# Patient Record
Sex: FEMALE | Race: WHITE | Employment: OTHER | URBAN - METROPOLITAN AREA
[De-identification: names, ages, dates, MRNs, and addresses within clinical notes are randomized per-mention and may not be internally consistent; named-entity substitution may affect disease eponyms.]

---

## 2017-02-20 ENCOUNTER — TRANSFERRED RECORDS (OUTPATIENT)
Dept: HEALTH INFORMATION MANAGEMENT | Facility: CLINIC | Age: 62
End: 2017-02-20

## 2017-02-20 LAB
CHOLEST SERPL-MCNC: 239 MG/DL (ref 0–199)
HDLC SERPL-MCNC: 50 MG/DL
HPV ABSTRACT: NORMAL
LDLC SERPL CALC-MCNC: 168 MG/DL (ref 0–129)
NONHDLC SERPL-MCNC: 189 MG/DL
PAP-ABSTRACT: NORMAL
TRIGL SERPL-MCNC: 103 MG/DL (ref 0–149)

## 2017-07-08 ENCOUNTER — HEALTH MAINTENANCE LETTER (OUTPATIENT)
Age: 62
End: 2017-07-08

## 2017-11-22 ENCOUNTER — TRANSFERRED RECORDS (OUTPATIENT)
Dept: HEALTH INFORMATION MANAGEMENT | Facility: CLINIC | Age: 62
End: 2017-11-22

## 2017-11-22 LAB — TSH SERPL-ACNC: 1.7 ULU/ML (ref 0.3–4.5)

## 2018-08-23 NOTE — PROGRESS NOTES
[employer]  [address] [facility] [address]  [phone]  Dept: [phone]    PRE-OP EVALUATION:  Today's date: 2018    Bryanna Estevez (: 1955) presents for pre-operative evaluation assessment as requested by Dr. Parrish John.  She requires evaluation and anesthesia risk assessment prior to undergoing surgery/procedure for treatment of R knee .    Colon cancer screening was done 2015    MAMMO was completed 2016    PAP is due per pt. Pt declined    Proposed Surgery/ Procedure: R knee surgery  Date of Surgery/ Procedure: 2018  Time of Surgery/ Procedure: [facility]  Hospital/Surgical Facility: [facility]  Fax number for surgical facility: [phone]  Primary Physician: Meghan Bustillos  Type of Anesthesia Anticipated: to be determined    Patient has a Health Care Directive or Living Will:  NO    1. NO - Do you have a history of heart attack, stroke, stent, bypass or surgery on an artery in the head, neck, heart or legs?  2. NO - Do you ever have any pain or discomfort in your chest?  3. NO - Do you have a history of  Heart Failure?  4. NO - Are you troubled by shortness of breath when: walking on the level, up a slight hill or at night?  5. NO - Do you currently have a cold, bronchitis or other respiratory infection?  6. NO - Do you have a cough, shortness of breath or wheezing?  7. NO - Do you sometimes get pains in the calves of your legs when you walk?  8. NO - Do you or anyone in your family have previous history of blood clots?  9. NO - Do you or does anyone in your family have a serious bleeding problem such as prolonged bleeding following surgeries or cuts?  10. NO - Have you ever had problems with anemia or been told to take iron pills?  11. NO - Have you had any abnormal blood loss such as black, tarry or bloody stools, or abnormal vaginal bleeding?  12. NO - Have you ever had a blood transfusion?  13. YES - Have you or any of your relatives ever  had problems with anesthesia?  Does not wake up from anesthesia well, was overmedicated previously.    14. NO - Do you have sleep apnea, excessive snoring or daytime drowsiness?  15. NO - Do you have any prosthetic heart valves?  16. NO - Do you have prosthetic joints?  17. NO - Is there any chance that you may be pregnant?      HPI:     HPI related to upcoming procedure:   Knee pain, has meniscal tear and will be having repair.       See problem list for active medical problems.  Problems all longstanding and stable, except as noted/documented.  See ROS for pertinent symptoms related to these conditions.                                                                                                                                                          .    MEDICAL HISTORY:     Patient Active Problem List    Diagnosis Date Noted     History of Clostridium difficile infection 11/12/2014     Priority: Medium     Hyperlipidemia with target LDL less than 130 09/09/2014     Priority: Medium     Diagnosis updated by automated process. Provider to review and confirm.       GERD (gastroesophageal reflux disease) 05/24/2011     Priority: Medium     Advanced directives, counseling/discussion 05/16/2011     Priority: Medium     Advance Directive Problem List Overview:   Name Relationship Phone    Primary Health Care Agent            Alternative Health Care Agent          Discussed advance care planning with patient; information given to patient to review. 5/16/2011          BMI 31.0-31.9,adult 05/16/2011     Priority: Medium     CARDIOVASCULAR SCREENING; LDL GOAL LESS THAN 160 10/31/2010     Priority: Medium      Past Medical History:   Diagnosis Date     Acid reflux      Acne      Insomnia      Rosacea      Past Surgical History:   Procedure Laterality Date     ARTHROSCOPY KNEE RT/LT      both knees     C BIOPSY OF STOMACH,BY LAPAROTOMY      exp     C CREATE NEW TUBAL OPENING      reversal tubal ligation     HC REPAIR  "ACHILLES TENDON,PRIMARY      both     TUBAL LIGATION       No current outpatient prescriptions on file.     OTC products: None, except as noted above    Allergies   Allergen Reactions     Penicillins Anaphylaxis     Elavil [Amitriptyline Hcl]       Latex Allergy: NO    Social History   Substance Use Topics     Smoking status: Never Smoker     Smokeless tobacco: Never Used     Alcohol use No      Comment: rare     History   Drug Use No       REVIEW OF SYSTEMS:   Constitutional, neuro, ENT, endocrine, pulmonary, cardiac, gastrointestinal, genitourinary, musculoskeletal, integument and psychiatric systems are negative, except as otherwise noted.    EXAM:   /80  Pulse 63  Temp 97.5  F (36.4  C) (Oral)  Resp 16  Ht 5' 9\" (1.753 m)  Wt 189 lb (85.7 kg)  SpO2 98%  Breastfeeding? No  BMI 27.91 kg/m2    GENERAL APPEARANCE: healthy, alert and no distress     EYES: EOMI, PERRL     HENT: ear canals and TM's normal and nose and mouth without ulcers or lesions     NECK: no adenopathy, no asymmetry, masses, or scars and thyroid normal to palpation     RESP: lungs clear to auscultation - no rales, rhonchi or wheezes     CV: regular rates and rhythm, normal S1 S2, no S3 or S4 and no murmur, click or rub     MS: extremities normal- no gross deformities noted, no evidence of inflammation in joints, FROM in all extremities.     SKIN: no suspicious lesions or rashes     NEURO: Normal strength and tone, sensory exam grossly normal, mentation intact and speech normal     PSYCH: mentation appears normal. and affect normal/bright     LYMPHATICS: No cervical adenopathy    DIAGNOSTICS:   EKG: sinus bradycardia, normal axis, normal intervals, no acute ST/T changes c/w ischemia, no LVH by voltage criteria, unchanged from previous tracings    Recent Labs   Lab Test  06/26/15   1403  05/12/15   1107  03/14/14   0920  03/11/14   1414   HGB  15.1  14.2   --   14.2   PLT  232  239   --   308   NA  139   --    --   137   POTASSIUM  4.5  "  --    --   3.7   CR  0.78   --   0.77  0.75        IMPRESSION:   Reason for surgery/procedure: meniscal tear  Diagnosis/reason for consult:   repair    The proposed surgical procedure is considered INTERMEDIATE risk.    REVISED CARDIAC RISK INDEX  The patient has the following serious cardiovascular risks for perioperative complications such as (MI, PE, VFib and 3  AV Block):  No serious cardiac risks  INTERPRETATION: 0 risks: Class I (very low risk - 0.4% complication rate)    The patient has the following additional risks for perioperative complications:  No identified additional risks      ICD-10-CM    1. Preop general physical exam Z01.818 EKG 12-lead complete w/read - Clinics   2. Tear of meniscus of right knee as current injury, unspecified meniscus, unspecified tear type, subsequent encounter S83.206D        RECOMMENDATIONS:     --Consult hospital rounder / IM to assist post-op medical management    --Patient is to take all scheduled medications on the day of surgery EXCEPT for modifications listed below.    APPROVAL GIVEN to proceed with proposed procedure, without further diagnostic evaluation       Signed Electronically by: Jaja Hsieh PA-C  Agreed with above. Asael Weber M.D.      Copy of this evaluation report is provided to requesting physician.    [facility] Preop Guidelines    Revised Cardiac Risk Index

## 2018-08-24 ENCOUNTER — OFFICE VISIT (OUTPATIENT)
Dept: FAMILY MEDICINE | Facility: CLINIC | Age: 63
End: 2018-08-24
Payer: COMMERCIAL

## 2018-08-24 VITALS
HEIGHT: 69 IN | BODY MASS INDEX: 27.99 KG/M2 | WEIGHT: 189 LBS | RESPIRATION RATE: 16 BRPM | SYSTOLIC BLOOD PRESSURE: 137 MMHG | OXYGEN SATURATION: 98 % | DIASTOLIC BLOOD PRESSURE: 80 MMHG | TEMPERATURE: 97.5 F | HEART RATE: 63 BPM

## 2018-08-24 DIAGNOSIS — Z01.818 PREOP GENERAL PHYSICAL EXAM: Primary | ICD-10-CM

## 2018-08-24 DIAGNOSIS — S83.206D TEAR OF MENISCUS OF RIGHT KNEE AS CURRENT INJURY, UNSPECIFIED MENISCUS, UNSPECIFIED TEAR TYPE, SUBSEQUENT ENCOUNTER: ICD-10-CM

## 2018-08-24 PROCEDURE — 93000 ELECTROCARDIOGRAM COMPLETE: CPT | Performed by: PHYSICIAN ASSISTANT

## 2018-08-24 PROCEDURE — 99214 OFFICE O/P EST MOD 30 MIN: CPT | Performed by: PHYSICIAN ASSISTANT

## 2018-08-24 NOTE — MR AVS SNAPSHOT
After Visit Summary   8/24/2018    Bryanna Estevez    MRN: [id number]           Patient Information     Date Of Birth          1955        Visit Information        Provider Department      8/24/2018 2:40 PM Jaja Hsieh PA-C [facility]        Today's Diagnoses     Preop general physical exam    -  1    Tear of meniscus of right knee as current injury, unspecified meniscus, unspecified tear type, subsequent encounter          Care Instructions      Before Your Surgery      Call your surgeon if there is any change in your health. This includes signs of a cold or flu (such as a sore throat, runny nose, cough, rash or fever).    Do not smoke, drink alcohol or take over the counter medicine (unless your surgeon or primary care doctor tells you to) for the 24 hours before and after surgery.    If you take prescribed drugs: Follow your doctor s orders about which medicines to take and which to stop until after surgery.    Eating and drinking prior to surgery: follow the instructions from your surgeon    Take a shower or bath the night before surgery. Use the soap your surgeon gave you to gently clean your skin. If you do not have soap from your surgeon, use your regular soap. Do not shave or scrub the surgery site.  Wear clean pajamas and have clean sheets on your bed.           Follow-ups after your visit        Who to contact     If you have questions or need follow up information about today's clinic visit or your schedule please contact [facility] directly at [phone].  Normal or non-critical lab and imaging results will be communicated to you by MyChart, letter or phone within 4 business days after the clinic has received the results. If you do not hear from us within 7 days, please contact the clinic through MyChart or phone. If you have a critical or abnormal lab result, we will notify you by phone as soon as possible.  Submit refill requests through  "MyChart or call your pharmacy and they will forward the refill request to us. Please allow 3 business days for your refill to be completed.          Additional Information About Your Visit        Care EveryWhere ID     This is your Care EveryWhere ID. This could be used by other organizations to access your [place] medical records  [id number]        Your Vitals Were     Pulse Temperature Respirations Height Pulse Oximetry Breastfeeding?    63 97.5  F (36.4  C) (Oral) 16 5' 9\" (1.753 m) 98% No    BMI (Body Mass Index)                   27.91 kg/m2            Blood Pressure from Last 3 Encounters:   08/24/18 137/80   05/29/16 144/83   06/26/15 120/78    Weight from Last 3 Encounters:   08/24/18 189 lb (85.7 kg)   06/28/16 190 lb (86.2 kg)   06/01/16 190 lb (86.2 kg)              We Performed the Following     EKG 12-lead complete w/read - Clinics        Primary Care Provider Office Phone # Fax #    Meghan Bustillos [phone] [address] [facility] [address]        Equal Access to Services     [facility]: Hadii aad ku hadasho Soomaali, waaxda luqadaha, qaybta kaalmada adejosephyada, kaylyn cornelius . So [facility] [phone].    ATENCIÓN: Si habla español, tiene a lopez disposición servicios gratuitos de asistencia lingüística. Ll[facility] [phone].    We comply with applicable federal civil rights laws and [place] laws. We do not discriminate on the basis of race, color, national origin, age, disability, sex, sexual orientation, or gender identity.            Thank you!     Thank you for choosing [facility]  for your care. Our goal is always to provide you with excellent care. Hearing back from our patients is one way we can continue to improve our services. Please take a few minutes to complete the written survey that you may receive in the mail after your visit with us. Thank you!             Your Updated Medication List - Protect others around you: " Learn how to safely use, store and throw away your medicines at www.disposemymeds.org.      Notice  As of 8/24/2018  3:17 PM    You have not been prescribed any medications.

## 2018-08-24 NOTE — NURSING NOTE
"Chief Complaint   Patient presents with     Pre-Op Exam     Preop surgery on 08/30/2018 for R knee with Dr. Parrish soto     Health Maintenance     orders pended       Initial /80  Pulse 63  Temp 97.5  F (36.4  C) (Oral)  Resp 16  Ht 5' 9\" (1.753 m)  Wt 189 lb (85.7 kg)  SpO2 98%  Breastfeeding? No  BMI 27.91 kg/m2 Estimated body mass index is 27.91 kg/(m^2) as calculated from the following:    Height as of this encounter: 5' 9\" (1.753 m).    Weight as of this encounter: 189 lb (85.7 kg).  Medication Reconciliation: complete      Reji Mendoza MA    "

## 2018-09-06 ENCOUNTER — OFFICE VISIT (OUTPATIENT)
Dept: URGENT CARE | Facility: URGENT CARE | Age: 63
End: 2018-09-06
Payer: COMMERCIAL

## 2018-09-06 VITALS
DIASTOLIC BLOOD PRESSURE: 67 MMHG | SYSTOLIC BLOOD PRESSURE: 145 MMHG | HEART RATE: 57 BPM | OXYGEN SATURATION: 98 % | BODY MASS INDEX: 27.91 KG/M2 | TEMPERATURE: 97.1 F | WEIGHT: 189 LBS

## 2018-09-06 DIAGNOSIS — R07.9 ACUTE CHEST PAIN: Primary | ICD-10-CM

## 2018-09-06 PROCEDURE — 93000 ELECTROCARDIOGRAM COMPLETE: CPT | Performed by: NURSE PRACTITIONER

## 2018-09-06 PROCEDURE — 99214 OFFICE O/P EST MOD 30 MIN: CPT | Performed by: NURSE PRACTITIONER

## 2018-09-06 ASSESSMENT — ENCOUNTER SYMPTOMS
WHEEZING: 0
SPUTUM PRODUCTION: 0
WEIGHT LOSS: 0
COUGH: 0
FEVER: 0
SHORTNESS OF BREATH: 0
NEUROLOGICAL NEGATIVE: 1
CHILLS: 0
VOMITING: 0
HEMOPTYSIS: 0
RESPIRATORY NEGATIVE: 1
GASTROINTESTINAL NEGATIVE: 1
NAUSEA: 0

## 2018-09-06 ASSESSMENT — PAIN SCALES - GENERAL: PAINLEVEL: SEVERE PAIN (7)

## 2018-09-06 NOTE — NURSING NOTE
"Chief Complaint   Patient presents with     Chest Pain     C/o sharp pain in the center of the chest that is going in to her back for about the last 30 mins. Does feel some fluttering as well. No SOB, or dizziness.       Initial /67  Pulse 57  Temp 97.1  F (36.2  C) (Oral)  Wt 189 lb (85.7 kg)  SpO2 98%  BMI 27.91 kg/m2 Estimated body mass index is 27.91 kg/(m^2) as calculated from the following:    Height as of 8/24/18: 5' 9\" (1.753 m).    Weight as of this encounter: 189 lb (85.7 kg)..  BP completed using cuff size: corinna Hollis CMA    "

## 2018-09-06 NOTE — MR AVS SNAPSHOT
After Visit Summary   9/6/2018    Bryanna Estevez    MRN: [id number]           Patient Information     Date Of Birth          1955        Visit Information        Provider Department      9/6/2018 6:35 PM Milagros Mcdaniel APRN CNP [facility]        Today's Diagnoses     Acute chest pain    -  1       Follow-ups after your visit        Follow-up notes from your care team     Return for To Emergency now for further evaluation..      Who to contact     If you have questions or need follow up information about today's clinic visit or your schedule please contact [facility] directly at [phone].  Normal or non-critical lab and imaging results will be communicated to you by MyChart, letter or phone within 4 business days after the clinic has received the results. If you do not hear from us within 7 days, please contact the clinic through MyChart or phone. If you have a critical or abnormal lab result, we will notify you by phone as soon as possible.  Submit refill requests through [employer] or call your pharmacy and they will forward the refill request to us. Please allow 3 business days for your refill to be completed.          Additional Information About Your Visit        Care EveryWhere ID     This is your Care EveryWhere ID. This could be used by other organizations to access your [place] medical records  [id number]        Your Vitals Were     Pulse Temperature Pulse Oximetry BMI (Body Mass Index)          57 97.1  F (36.2  C) (Oral) 98% 27.91 kg/m2         Blood Pressure from Last 3 Encounters:   09/06/18 145/67   08/24/18 137/80   05/29/16 144/83    Weight from Last 3 Encounters:   09/06/18 189 lb (85.7 kg)   08/24/18 189 lb (85.7 kg)   06/28/16 190 lb (86.2 kg)              We Performed the Following     EKG 12-lead complete w/read - Clinics        Primary Care Provider Office Phone # Fax #    Meghan Bustillos [phone] [address]  [facility] [address]        Equal Access to Services     [facility] : Hadii aad ku hadyadiracoreen Thurman, wamayco calvillo, constantinekaylyn christina. So [facility] [phone].    ATENCIÓN: Si habla español, tiene a lopez disposición servicios gratuitos de asistencia lingüística. Llame al [phone].    We comply with applicable federal civil rights laws and [place] laws. We do not discriminate on the basis of race, color, national origin, age, disability, sex, sexual orientation, or gender identity.            Thank you!     Thank you for choosing [facility]  for your care. Our goal is always to provide you with excellent care. Hearing back from our patients is one way we can continue to improve our services. Please take a few minutes to complete the written survey that you may receive in the mail after your visit with us. Thank you!             Your Updated Medication List - Protect others around you: Learn how to safely use, store and throw away your medicines at www.disposemymeds.org.      Notice  As of 9/6/2018  8:32 PM    You have not been prescribed any medications.

## 2018-09-07 NOTE — PROGRESS NOTES
HPI  63 year old Bryanna Estevez presents with CHIEF COMPLAINT of chest pain. It started approximately one hour ago and is midsternal with radiation to the back. She denies SOB, diaphoresis or nausea. She notes she had arthroscopic knee surgery on 8/30 (1 week ago) and also has pain in her right groin with some swelling of the right anterior thigh.  No treatment PTA in UC.     Past Medical History:   Diagnosis Date     Acid reflux      Acne      Insomnia      Rosacea      Patient Active Problem List   Diagnosis     CARDIOVASCULAR SCREENING; LDL GOAL LESS THAN 160     Advanced directives, counseling/discussion     BMI 31.0-31.9,adult     GERD (gastroesophageal reflux disease)     Hyperlipidemia with target LDL less than 130     History of Clostridium difficile infection     Tear of meniscus of right knee as current injury, unspecified meniscus, unspecified tear type, subsequent encounter     Past Surgical History:   Procedure Laterality Date     ARTHROSCOPY KNEE RT/LT      both knees     C BIOPSY OF STOMACH,BY LAPAROTOMY      exp     C CREATE NEW TUBAL OPENING      reversal tubal ligation     HC REPAIR ACHILLES TENDON,PRIMARY      both     TUBAL LIGATION       Allergies   Allergen Reactions     Penicillins Anaphylaxis     Amitriptyline      Other reaction(s): *Unknown     Elavil [Amitriptyline Hcl]      No current outpatient prescriptions on file.     No current facility-administered medications for this visit.           Review of Systems   Constitutional: Negative for chills, fever, malaise/fatigue and weight loss.   HENT: Negative.    Respiratory: Negative.  Negative for cough, hemoptysis, sputum production, shortness of breath and wheezing.    Cardiovascular: Positive for chest pain and leg swelling.   Gastrointestinal: Negative.  Negative for nausea and vomiting.   Genitourinary: Negative.    Musculoskeletal:        Anterior chest pain, right knee stiff and recent surgery.   Skin: Negative.  Negative for rash.         Steri strip right knee   Neurological: Negative.    Endo/Heme/Allergies: Negative.          Physical Exam   Constitutional: She is well-developed, well-nourished, and in no distress. No distress.   /67  Pulse 57  Temp 97.1  F (36.2  C) (Oral)  Wt 189 lb (85.7 kg)  SpO2 98%  BMI 27.91 kg/m2.    HENT:   Head: Normocephalic.   Cardiovascular: Regular rhythm, normal heart sounds and normal pulses.  Bradycardia present.    12 lead EKG shows sinus josué 50s without ectopy of ST changes.    Pulmonary/Chest: Effort normal. No tachypnea. She has rales.       Slight crackle right LL, Clears with cough. Pain is caused by palpation of the sternum and with deep inspiration.    Abdominal: There is no tenderness.   Neurological: She is alert.   Skin: Skin is warm and dry. No erythema.   Nursing note and vitals reviewed.    Assessment:  1. Chest pain        Plan:  DD: muscle strain, pulmonary embolism, cardiac ischemia, GERD  With possible right leg DVT and one week post-op from knee surgery the risk of PE   Warrants immediate referral. Transportation was discussed with patient and her    and they prefer to go by private care. Risks of private transport were   Discussed and they wish to proceed to [facility] by car. Report was called to the charge  RN and Patient instructed to go directly to the ER where they are expecting her    DEVEN Arora, CNP

## 2018-10-25 ENCOUNTER — TELEPHONE (OUTPATIENT)
Dept: FAMILY MEDICINE | Facility: CLINIC | Age: 63
End: 2018-10-25

## 2019-01-03 ENCOUNTER — TELEPHONE (OUTPATIENT)
Dept: FAMILY MEDICINE | Facility: CLINIC | Age: 64
End: 2019-01-03

## 2019-01-03 NOTE — TELEPHONE ENCOUNTER
Last mammo in 2016 with [facility]. Pt has not had one done since 2016 and declines mammogram with us and she does not plan to have another one done with [facility] either, stating, it was normal so she is good. I thanked pt for her time. Pt understood.

## 2019-03-05 NOTE — PROGRESS NOTES
"  SUBJECTIVE:   Bryanna Estevez is a 64 year old female who presents to clinic today for the following health issues:      Back Pain       Duration: ***        Specific cause: {.:976721::\"none\"}    Description:   Location of pain: {.:031890}  Character of pain: {.:410023}  Pain radiation:{.:939695::\"none\"}  New numbness or weakness in legs, not attributed to pain:  { :500185}    Intensity: {.:777475::\"Currently ***/10\"}    History:   Pain interferes with job: {.:515081::\"***\"}  History of back problems: {.:798640::\"no prior back problems\"}  Any previous MRI or X-rays: {.:191439::\"None\"}  Sees a specialist for back pain:  { :602438::\"No\"}  Therapies tried without relief: {.:668593}    Alleviating factors:   Improved by: {.:431349}      Precipitating factors:  Worsened by: {.:129182::\"Nothing\"}    {Rooming staff to stop here}      Accompanying Signs & Symptoms:  Risk of Fracture:  {.:702040::\"None\"}  Risk of Cauda Equina:  {.:670436::\"None\"}  Risk of Infection:  {.:366490::\"None\"}  Risk of Cancer:  {.:898619::\"None\"}  Risk of Ankylosing Spondylitis:  Onset at age <35, male, AND morning back stiffness. { :369559}    {If yes to any of the last 5 items or sudden/progressive weakness, consider imaging and/or surgical referral, if not already done}    {When medically safe, First line: medication for acute LBP:  Acetaminophen  Second line: NSAIDS, muscle relaxants, and consider gabapentin for radiculopathy; opioids are usually not needed. Can add <dot> pilbp in patient instructions}         {additional problems for provider to add:196502}    Problem list and histories reviewed & adjusted, as indicated.  Additional history: {NONE - AS DOCUMENTED:791313::\"as documented\"}    {HIST REVIEW/ LINKS 2:949420}    Reviewed and updated as needed this visit by clinical staff       Reviewed and updated as needed this visit by Provider         {PROVIDER CHARTING PREFERENCE:825147}  "

## 2019-03-11 ENCOUNTER — OFFICE VISIT (OUTPATIENT)
Dept: FAMILY MEDICINE | Facility: CLINIC | Age: 64
End: 2019-03-11
Payer: COMMERCIAL

## 2019-03-11 VITALS
RESPIRATION RATE: 16 BRPM | DIASTOLIC BLOOD PRESSURE: 64 MMHG | WEIGHT: 188.8 LBS | HEIGHT: 68 IN | HEART RATE: 62 BPM | BODY MASS INDEX: 28.61 KG/M2 | TEMPERATURE: 97.6 F | SYSTOLIC BLOOD PRESSURE: 136 MMHG

## 2019-03-11 DIAGNOSIS — N30.01 ACUTE CYSTITIS WITH HEMATURIA: ICD-10-CM

## 2019-03-11 DIAGNOSIS — R10.9 FLANK PAIN: Primary | ICD-10-CM

## 2019-03-11 DIAGNOSIS — Z12.31 ENCOUNTER FOR SCREENING MAMMOGRAM FOR BREAST CANCER: ICD-10-CM

## 2019-03-11 DIAGNOSIS — R79.89 ELEVATED LFTS: ICD-10-CM

## 2019-03-11 DIAGNOSIS — M62.830 BACK MUSCLE SPASM: ICD-10-CM

## 2019-03-11 DIAGNOSIS — R82.90 NONSPECIFIC FINDING ON EXAMINATION OF URINE: ICD-10-CM

## 2019-03-11 DIAGNOSIS — J01.01 ACUTE RECURRENT MAXILLARY SINUSITIS: ICD-10-CM

## 2019-03-11 LAB
ALBUMIN SERPL-MCNC: 3.5 G/DL (ref 3.4–5)
ALBUMIN UR-MCNC: NEGATIVE MG/DL
ALP SERPL-CCNC: 92 U/L (ref 40–150)
ALT SERPL W P-5'-P-CCNC: 35 U/L (ref 0–50)
APPEARANCE UR: CLEAR
AST SERPL W P-5'-P-CCNC: 28 U/L (ref 0–45)
BACTERIA #/AREA URNS HPF: ABNORMAL /HPF
BILIRUB DIRECT SERPL-MCNC: <0.1 MG/DL (ref 0–0.2)
BILIRUB SERPL-MCNC: 0.4 MG/DL (ref 0.2–1.3)
BILIRUB UR QL STRIP: NEGATIVE
COLOR UR AUTO: YELLOW
GLUCOSE UR STRIP-MCNC: NEGATIVE MG/DL
HGB UR QL STRIP: ABNORMAL
KETONES UR STRIP-MCNC: NEGATIVE MG/DL
LEUKOCYTE ESTERASE UR QL STRIP: ABNORMAL
NITRATE UR QL: NEGATIVE
NON-SQ EPI CELLS #/AREA URNS LPF: ABNORMAL /LPF
PH UR STRIP: 5.5 PH (ref 5–7)
PROT SERPL-MCNC: 7.2 G/DL (ref 6.8–8.8)
RBC #/AREA URNS AUTO: ABNORMAL /HPF
SOURCE: ABNORMAL
SP GR UR STRIP: 1.02 (ref 1–1.03)
TRANS CELLS #/AREA URNS HPF: ABNORMAL /HPF
UROBILINOGEN UR STRIP-ACNC: 0.2 EU/DL (ref 0.2–1)
WBC #/AREA URNS AUTO: ABNORMAL /HPF

## 2019-03-11 PROCEDURE — 81001 URINALYSIS AUTO W/SCOPE: CPT | Performed by: FAMILY MEDICINE

## 2019-03-11 PROCEDURE — 80076 HEPATIC FUNCTION PANEL: CPT | Performed by: FAMILY MEDICINE

## 2019-03-11 PROCEDURE — 87086 URINE CULTURE/COLONY COUNT: CPT | Performed by: FAMILY MEDICINE

## 2019-03-11 PROCEDURE — 36415 COLL VENOUS BLD VENIPUNCTURE: CPT | Performed by: FAMILY MEDICINE

## 2019-03-11 PROCEDURE — 99214 OFFICE O/P EST MOD 30 MIN: CPT | Performed by: FAMILY MEDICINE

## 2019-03-11 RX ORDER — SULFAMETHOXAZOLE/TRIMETHOPRIM 800-160 MG
1 TABLET ORAL 2 TIMES DAILY
Qty: 20 TABLET | Refills: 0 | Status: SHIPPED | OUTPATIENT
Start: 2019-03-11 | End: 2019-06-17

## 2019-03-11 RX ORDER — METHOCARBAMOL 500 MG/1
500 TABLET, FILM COATED ORAL 4 TIMES DAILY PRN
Qty: 30 TABLET | Refills: 1 | Status: SHIPPED | OUTPATIENT
Start: 2019-03-11 | End: 2019-06-17

## 2019-03-11 ASSESSMENT — MIFFLIN-ST. JEOR: SCORE: 1454.89

## 2019-03-11 ASSESSMENT — PAIN SCALES - GENERAL: PAINLEVEL: MILD PAIN (3)

## 2019-03-11 NOTE — NURSING NOTE
"Chief Complaint   Patient presents with     Back Pain     Fatigue     Sinus Problem       Initial /74   Pulse 62   Temp 97.6  F (36.4  C) (Oral)   Resp 16   Ht 1.727 m (5' 8\")   Wt 85.6 kg (188 lb 12.8 oz)   BMI 28.71 kg/m   Estimated body mass index is 28.71 kg/m  as calculated from the following:    Height as of this encounter: 1.727 m (5' 8\").    Weight as of this encounter: 85.6 kg (188 lb 12.8 oz).  Medication Reconciliation: complete  Raghavendra Michael CMA    "

## 2019-03-11 NOTE — Clinical Note
Please abstract the following data from this visit with this patient into the appropriate field in Epic:Lipids- 2/20/17 found in care everywhere

## 2019-03-11 NOTE — PROGRESS NOTES
"  SUBJECTIVE:  Bryanna Estevez is a 64 year old female who presents with the following concerns;              Symptoms: cc Present Absent Comment   Fever/Chills   x    Fatigue  x  X 4 months    Muscle Aches  x     Eye Irritation   x    Sneezing  x     Nasal Evin/Drg  x     Sinus Pressure/Pain  x     Loss of smell   x    Dental pain   x    Sore Throat  x  Scratchy    Swollen Glands  x  Right sided tenderness    Ear Pain/Fullness   x    Cough  x  Occasional    Wheeze   x    Chest Pain  x  Tightness    Shortness of breath   x    Rash   x    Other  x  Back pain x 3 weeks left of midline, started after right knee surgery, no radicular symptoms into leg, no new incontinence. Has baseline urge incontinence.  advil does not help pain.   Right knee not improved after surgery, altered gait  +urinary odor and frequency      Symptom duration:  x 4 days   Sympom severity:  worsening   Treatments tried:  Hot water on face, throat lozenges, advil for pain    Contacts:  none        Medications updated and reviewed.  Past, family and surgical history is updated and reviewed in the record.  ROS:  Other than noted above, general, HEENT, respiratory, cardiac and gastrointestinal systems are negative.  OBJECTIVE:  /64   Pulse 62   Temp 97.6  F (36.4  C) (Oral)   Resp 16   Ht 1.727 m (5' 8\")   Wt 85.6 kg (188 lb 12.8 oz)   BMI 28.71 kg/m     GENERAL: Pleasant and interactive. No acute distress.  HEENT: Normocephalic, atraumatic. PEERRLA, EOMI.  Scleras, lids and conjunctivae normal. Pinnas, canals and TM's clear.  Nose with thick discharge. Oropharynx moist and clear. Sinus tenderness to palpation present.   NECK: supple and free of adenopathy or masses, the thyroid is normal without enlargement or nodules.  CHEST:  clear, no wheezing or rales. Normal symmetric air entry throughout both lung fields. No chest wall deformities or tenderness.  HEART:  S1 and S2 normal, no murmurs, clicks, gallops or rubs. Regular rate and " rhythm.  SKIN:  Only benign skin findings. No unusual rashes or suspicious skin lesions noted. Nails appear normal.  BACK: no CVAT, + ttp over paralumbar muscles on left.   UA RESULTS:  Recent Labs   Lab Test 03/11/19  1545   COLOR Yellow   APPEARANCE Clear   URINEGLC Negative   URINEBILI Negative   URINEKETONE Negative   SG 1.025   UBLD Moderate*   URINEPH 5.5   PROTEIN Negative   UROBILINOGEN 0.2   NITRITE Negative   LEUKEST Moderate*   RBCU 2-5*   WBCU 10-25*          Assessment:  (R10.9) Flank pain  (primary encounter diagnosis)  (M62.830) Back muscle spasm  Comment: likely muscle spasm due to change in gait from right knee issues  Plan: UA reflex to Microscopic and Culture, Urine         Microscopic        methocarbamol (ROBAXIN) 500 MG tablet        Trial of robaxin, consider pt if not improving    (N30.01) Acute cystitis with hematuria  (R82.90) Nonspecific finding on examination of urine  Comment: + UA  Plan: sulfamethoxazole-trimethoprim (BACTRIM         DS/SEPTRA DS) 800-160 MG tablet         Urine Culture Aerobic Bacterial        Treat with bactrim DS adjust if cx results     (J01.01) Acute recurrent maxillary sinusitis  Comment: acute onset  Plan: sulfamethoxazole-trimethoprim (BACTRIM         DS/SEPTRA DS) 800-160 MG tablet        Treat with bactrim to cover both UTI and sinusitis    (R94.5) Elevated LFTs  Comment: past elevation  Plan: Hepatic panel (Albumin, ALT, AST, Bili, Alk         Phos, TP)            (Z12.31) Encounter for screening mammogram for breast cancer  Comment: due  Plan: MA SCREENING DIGITAL BILAT - Future  (s+30)

## 2019-03-12 LAB
BACTERIA SPEC CULT: NORMAL
SPECIMEN SOURCE: NORMAL

## 2019-06-14 NOTE — PROGRESS NOTES
[facility]  [address] [facility] [id number] [phone]  Dept: [phone]    PRE-OP EVALUATION:  Today's date: 2019    Bryanna Estevez (: 1955) presents for pre-operative evaluation assessment as requested by Dr. John.  She requires evaluation and anesthesia risk assessment prior to undergoing surgery/procedure for treatment of right knee replacement .    Fax number for surgical facility: [place]  Primary Physician: Meghan Bustillos  Type of Anesthesia Anticipated: to be determined    Patient has a Health Care Directive or Living Will:  NO    Preop Questions 2019   Who is doing your surgery? Dr John   What are you having done? total knee replacement   Date of Surgery/Procedure:    1.  Do you have a history of Heart attack, stroke, stent, coronary bypass surgery, or other heart surgery? No   2.  Do you ever have any pain or discomfort in your chest? No   3.  Do you have a history of  Heart Failure? No   4.   Are you troubled by shortness of breath when:  walking on a level surface, or up a slight hill, or at night? No   5.  Do you currently have a cold, bronchitis or other respiratory infection? No   6.  Do you have a cough, shortness of breath, or wheezing? No   7.  Do you sometimes get pains in the calves of your legs when you walk? No   8. Do you or anyone in your family have previous history of blood clots? No   9.  Do you or does anyone in your family have a serious bleeding problem such as prolonged bleeding following surgeries or cuts? No   10. Have you ever had problems with anemia or been told to take iron pills? No   11. Have you had any abnormal blood loss such as black, tarry or bloody stools, or abnormal vaginal bleeding? No   12. Have you ever had a blood transfusion? No   13. Have you or any of your relatives ever had problems with anesthesia? YES - self, pt has trouble waking up   14. Do you have sleep apnea, excessive snoring or daytime  drowsiness? No   15. Do you have any prosthetic heart valves? No   16. Do you have prosthetic joints? No   17. Is there any chance that you may be pregnant? No         HPI:     HPI related to upcoming procedure: Pt to undergo right knee replacement due to pain from DJD      HYPERLIPIDEMIA - Patient has a long history of significant Hyperlipidemia requiring medication for treatment with recent good control. Patient reports no problems or side effects with the medication.       MEDICAL HISTORY:     Patient Active Problem List    Diagnosis Date Noted     Tear of meniscus of right knee as current injury, unspecified meniscus, unspecified tear type, subsequent encounter 08/24/2018     Priority: Medium     History of Clostridium difficile infection 11/12/2014     Priority: Medium     Hyperlipidemia with target LDL less than 130 09/09/2014     Priority: Medium     Diagnosis updated by automated process. Provider to review and confirm.       GERD (gastroesophageal reflux disease) 05/24/2011     Priority: Medium     Advanced directives, counseling/discussion 05/16/2011     Priority: Medium     Advance Directive Problem List Overview:   Name Relationship Phone    Primary Health Care Agent            Alternative Health Care Agent          Discussed advance care planning with patient; information given to patient to review. 5/16/2011          BMI 31.0-31.9,adult 05/16/2011     Priority: Medium     CARDIOVASCULAR SCREENING; LDL GOAL LESS THAN 160 10/31/2010     Priority: Medium      Past Medical History:   Diagnosis Date     Acid reflux      Acne      Insomnia      Rosacea      Past Surgical History:   Procedure Laterality Date     ARTHROSCOPY KNEE RT/LT      both knees     C BIOPSY OF STOMACH,BY LAPAROTOMY      exp     C CREATE NEW TUBAL OPENING      reversal tubal ligation     HC REPAIR ACHILLES TENDON,PRIMARY      both     TUBAL LIGATION       No current outpatient medications on file.     OTC products: None, except as noted  above    Allergies   Allergen Reactions     Penicillins Anaphylaxis     Amitriptyline      Other reaction(s): *Unknown     Elavil [Amitriptyline Hcl]       Latex Allergy: NO    Social History     Tobacco Use     Smoking status: Never Smoker     Smokeless tobacco: Never Used   Substance Use Topics     Alcohol use: No     Comment: rare     History   Drug Use No       REVIEW OF SYSTEMS:   Constitutional, neuro, ENT, endocrine, pulmonary, cardiac, gastrointestinal, genitourinary, musculoskeletal, integument and psychiatric systems are negative, except as otherwise noted.    EXAM:   /81   Pulse 59   Temp 97.8  F (36.6  C) (Oral)   Wt 83.9 kg (185 lb)   SpO2 98%   BMI 28.13 kg/m      GENERAL APPEARANCE: healthy, alert and no distress     EYES: EOMI, PERRL     HENT: ear canals and TM's normal and nose and mouth without ulcers or lesions     NECK: no adenopathy, no asymmetry, masses, or scars and thyroid normal to palpation     RESP: lungs clear to auscultation - no rales, rhonchi or wheezes     CV: regular rates and rhythm, normal S1 S2, no S3 or S4 and no murmur, click or rub     ABDOMEN:  soft, nontender, no HSM or masses and bowel sounds normal     MS: extremities normal- no gross deformities noted, no evidence of inflammation in joints, FROM in all extremities.     PSYCH: mentation appears normal. and affect normal/bright    DIAGNOSTICS:   EKG: sinus bradycardia, normal axis, normal intervals, no acute ST/T changes c/w ischemia, no LVH by voltage criteria, unchanged from previous tracings    Lab Results   Component Value Date    WBC 6.0 06/17/2019     Lab Results   Component Value Date    RBC 4.72 06/17/2019     Lab Results   Component Value Date    HGB 14.8 06/17/2019     Lab Results   Component Value Date    HCT 44.0 06/17/2019     No components found for: MCT  Lab Results   Component Value Date    MCV 93 06/17/2019     Lab Results   Component Value Date    MCH 31.4 06/17/2019     Lab Results   Component  Value Date    MCHC 33.6 06/17/2019     Lab Results   Component Value Date    RDW 12.5 06/17/2019     Lab Results   Component Value Date     06/17/2019     UA RESULTS:  Recent Labs   Lab Test 06/17/19  0755   COLOR Yellow   APPEARANCE Clear   URINEGLC Negative   URINEBILI Small*   URINEKETONE Negative   SG >1.030   UBLD Trace*   URINEPH 5.5   PROTEIN Negative   UROBILINOGEN 0.2   NITRITE Negative   LEUKEST Negative   RBCU O - 2   WBCU 0 - 5           IMPRESSION:   Reason for surgery/procedure: right knee replacement due to DJD    The proposed surgical procedure is considered INTERMEDIATE risk.    REVISED CARDIAC RISK INDEX  The patient has the following serious cardiovascular risks for perioperative complications such as (MI, PE, VFib and 3  AV Block):  No serious cardiac risks  INTERPRETATION: 0 risks: Class I (very low risk - 0.4% complication rate)    The patient has the following additional risks for perioperative complications:  No identified additional risks      ICD-10-CM    1. Preop general physical exam Z01.818 Basic metabolic panel     CBC with platelets     UA reflex to Microscopic   2. Osteoarthritis of right knee, unspecified osteoarthritis type M17.11        RECOMMENDATIONS:         --Patient is to take all scheduled medications on the day of surgery EXCEPT for modifications listed below.    Anticoagulant or Antiplatelet Medication Use  NSAIDS: Ibuprofen (Motrin):         Stop one day prior to surgery        APPROVAL GIVEN to proceed with proposed procedure, without further diagnostic evaluation       Signed Electronically by: Meghan Nieves MD    Copy of this evaluation report is provided to requesting physician.    [facility] Preop Guidelines    Revised Cardiac Risk Index

## 2019-06-17 ENCOUNTER — OFFICE VISIT (OUTPATIENT)
Dept: FAMILY MEDICINE | Facility: CLINIC | Age: 64
End: 2019-06-17
Payer: COMMERCIAL

## 2019-06-17 VITALS
DIASTOLIC BLOOD PRESSURE: 81 MMHG | BODY MASS INDEX: 28.13 KG/M2 | HEART RATE: 59 BPM | SYSTOLIC BLOOD PRESSURE: 137 MMHG | WEIGHT: 185 LBS | OXYGEN SATURATION: 98 % | TEMPERATURE: 97.8 F

## 2019-06-17 DIAGNOSIS — M17.11 OSTEOARTHRITIS OF RIGHT KNEE, UNSPECIFIED OSTEOARTHRITIS TYPE: ICD-10-CM

## 2019-06-17 DIAGNOSIS — N30.00 ACUTE CYSTITIS WITHOUT HEMATURIA: ICD-10-CM

## 2019-06-17 DIAGNOSIS — Z01.818 PREOP GENERAL PHYSICAL EXAM: Primary | ICD-10-CM

## 2019-06-17 LAB
ALBUMIN UR-MCNC: NEGATIVE MG/DL
ANION GAP SERPL CALCULATED.3IONS-SCNC: 6 MMOL/L (ref 3–14)
APPEARANCE UR: CLEAR
BACTERIA #/AREA URNS HPF: ABNORMAL /HPF
BILIRUB UR QL STRIP: ABNORMAL
BUN SERPL-MCNC: 12 MG/DL (ref 7–30)
CALCIUM SERPL-MCNC: 9.2 MG/DL (ref 8.5–10.1)
CHLORIDE SERPL-SCNC: 107 MMOL/L (ref 94–109)
CO2 SERPL-SCNC: 27 MMOL/L (ref 20–32)
COLOR UR AUTO: YELLOW
CREAT SERPL-MCNC: 0.77 MG/DL (ref 0.52–1.04)
ERYTHROCYTE [DISTWIDTH] IN BLOOD BY AUTOMATED COUNT: 12.5 % (ref 10–15)
GFR SERPL CREATININE-BSD FRML MDRD: 82 ML/MIN/{1.73_M2}
GLUCOSE SERPL-MCNC: 98 MG/DL (ref 70–99)
GLUCOSE UR STRIP-MCNC: NEGATIVE MG/DL
HCT VFR BLD AUTO: 44 % (ref 35–47)
HGB BLD-MCNC: 14.8 G/DL (ref 11.7–15.7)
HGB UR QL STRIP: ABNORMAL
KETONES UR STRIP-MCNC: NEGATIVE MG/DL
LEUKOCYTE ESTERASE UR QL STRIP: NEGATIVE
MCH RBC QN AUTO: 31.4 PG (ref 26.5–33)
MCHC RBC AUTO-ENTMCNC: 33.6 G/DL (ref 31.5–36.5)
MCV RBC AUTO: 93 FL (ref 78–100)
MUCOUS THREADS #/AREA URNS LPF: PRESENT /LPF
NITRATE UR QL: NEGATIVE
NON-SQ EPI CELLS #/AREA URNS LPF: ABNORMAL /LPF
PH UR STRIP: 5.5 PH (ref 5–7)
PLATELET # BLD AUTO: 279 10E9/L (ref 150–450)
POTASSIUM SERPL-SCNC: 3.9 MMOL/L (ref 3.4–5.3)
RBC # BLD AUTO: 4.72 10E12/L (ref 3.8–5.2)
RBC #/AREA URNS AUTO: ABNORMAL /HPF
SODIUM SERPL-SCNC: 140 MMOL/L (ref 133–144)
SOURCE: ABNORMAL
SP GR UR STRIP: >1.03 (ref 1–1.03)
UROBILINOGEN UR STRIP-ACNC: 0.2 EU/DL (ref 0.2–1)
WBC # BLD AUTO: 6 10E9/L (ref 4–11)
WBC #/AREA URNS AUTO: ABNORMAL /HPF

## 2019-06-17 PROCEDURE — 80048 BASIC METABOLIC PNL TOTAL CA: CPT | Performed by: FAMILY MEDICINE

## 2019-06-17 PROCEDURE — 87088 URINE BACTERIA CULTURE: CPT | Performed by: FAMILY MEDICINE

## 2019-06-17 PROCEDURE — 36415 COLL VENOUS BLD VENIPUNCTURE: CPT | Performed by: FAMILY MEDICINE

## 2019-06-17 PROCEDURE — 85027 COMPLETE CBC AUTOMATED: CPT | Performed by: FAMILY MEDICINE

## 2019-06-17 PROCEDURE — 81001 URINALYSIS AUTO W/SCOPE: CPT | Performed by: FAMILY MEDICINE

## 2019-06-17 PROCEDURE — 87086 URINE CULTURE/COLONY COUNT: CPT | Performed by: FAMILY MEDICINE

## 2019-06-17 PROCEDURE — 99214 OFFICE O/P EST MOD 30 MIN: CPT | Performed by: FAMILY MEDICINE

## 2019-06-17 ASSESSMENT — PAIN SCALES - GENERAL: PAINLEVEL: NO PAIN (0)

## 2019-06-17 NOTE — NURSING NOTE
"Chief Complaint   Patient presents with     Pre-Op Exam       Initial /81   Pulse 59   Temp 97.8  F (36.6  C) (Oral)   Wt 83.9 kg (185 lb)   SpO2 98%   BMI 28.13 kg/m   Estimated body mass index is 28.13 kg/m  as calculated from the following:    Height as of 3/11/19: 1.727 m (5' 8\").    Weight as of this encounter: 83.9 kg (185 lb).  Medication Reconciliation: complete    JEN Cole MA    "

## 2019-06-17 NOTE — LETTER
June 18, 2019    Bryanna Estevez  [address] [facility] [address]            Dear Bryanna BIRCH,    Pre -op labs    If you have any questions or concerns, please call myself or my nurse at [phone].    Sincerely,    Meghan Nieves MD/juan jose    Results for orders placed or performed in visit on 06/17/19   Basic metabolic panel   Result Value Ref Range    Sodium 140 133 - 144 mmol/L    Potassium 3.9 3.4 - 5.3 mmol/L    Chloride 107 94 - 109 mmol/L    Carbon Dioxide 27 20 - 32 mmol/L    Anion Gap 6 3 - 14 mmol/L    Glucose 98 70 - 99 mg/dL    Urea Nitrogen 12 7 - 30 mg/dL    Creatinine 0.77 0.52 - 1.04 mg/dL    GFR Estimate 82 >60 mL/min/[1.73_m2]    GFR Estimate If Black >90 >60 mL/min/[1.73_m2]    Calcium 9.2 8.5 - 10.1 mg/dL   CBC with platelets   Result Value Ref Range    WBC 6.0 4.0 - 11.0 10e9/L    RBC Count 4.72 3.8 - 5.2 10e12/L    Hemoglobin 14.8 11.7 - 15.7 g/dL    Hematocrit 44.0 35.0 - 47.0 %    MCV 93 78 - 100 fl    MCH 31.4 26.5 - 33.0 pg    MCHC 33.6 31.5 - 36.5 g/dL    RDW 12.5 10.0 - 15.0 %    Platelet Count 279 150 - 450 10e9/L   UA reflex to Microscopic   Result Value Ref Range    Color Urine Yellow     Appearance Urine Clear     Glucose Urine Negative NEG^Negative mg/dL    Bilirubin Urine Small (A) NEG^Negative    Ketones Urine Negative NEG^Negative mg/dL    Specific Gravity Urine >1.030 1.003 - 1.035    Blood Urine Trace (A) NEG^Negative    pH Urine 5.5 5.0 - 7.0 pH    Protein Albumin Urine Negative NEG^Negative mg/dL    Urobilinogen Urine 0.2 0.2 - 1.0 EU/dL    Nitrite Urine Negative NEG^Negative    Leukocyte Esterase Urine Negative NEG^Negative    Source Midstream Urine    Urine Microscopic   Result Value Ref Range    WBC Urine 0 - 5 OTO5^0 - 5 /HPF    RBC Urine O - 2 OTO2^O - 2 /HPF    Squamous Epithelial /LPF Urine Few FEW^Few /LPF    Bacteria Urine Few (A) NEG^Negative /HPF    Mucous Urine Present (A) NEG^Negative /LPF   Urine Culture Aerobic Bacterial   Result Value Ref Range     Specimen Description Midstream Urine     Culture Micro       Referred to [facility], await final report.

## 2019-06-18 LAB
BACTERIA SPEC CULT: ABNORMAL
BACTERIA SPEC CULT: ABNORMAL
SPECIMEN SOURCE: ABNORMAL

## 2019-06-18 RX ORDER — SULFAMETHOXAZOLE AND TRIMETHOPRIM 400; 80 MG/1; MG/1
1 TABLET ORAL 2 TIMES DAILY
Qty: 20 TABLET | Refills: 0 | Status: SHIPPED | OUTPATIENT
Start: 2019-06-18 | End: 2019-07-24

## 2019-06-18 NOTE — PROGRESS NOTES
I faxed the Pre Op, labs and most recent 9/6/18 EKG to [place]/Dr. John @[phone].  Shruthi Bob,

## 2019-06-22 ENCOUNTER — NURSE TRIAGE (OUTPATIENT)
Dept: NURSING | Facility: CLINIC | Age: 64
End: 2019-06-22

## 2019-06-22 ENCOUNTER — TELEPHONE (OUTPATIENT)
Dept: FAMILY MEDICINE | Facility: CLINIC | Age: 64
End: 2019-06-22

## 2019-06-22 NOTE — TELEPHONE ENCOUNTER
"Clinic Action Needed:FYI only, patient requesting Dr Bustillos be updated on her symptoms.     Reason for Call:  \"I am taking Bactrim and having a total knee replacement scheduled.\" Patient started Bactrim Wednesday 6/17/19. \" Patient reporting she has been having 2 loose stools per day since starting antibiotic. Afebrile.  Patient has history of c-diff. Taking fluids. Reporting intermittent abdominal cramping that is relieved with passing stool.   Patient's contact Phone [phone] Information    Negative: Shock suspected (e.g., cold/pale/clammy skin, too weak to stand, low BP, rapid pulse)    Negative: Difficult to awaken or acting confused (e.g., disoriented, slurred speech)    Negative: Sounds like a life-threatening emergency to the triager    Negative: Vomiting also present and worse than the diarrhea    Negative: [1] Blood in stool AND [2] without diarrhea    Negative: Diarrhea in a cancer patient who is currently (or recently) receiving chemotherapy or radiation therapy, or cancer patient who has metastatic or end-stage cancer and is receiving palliative care    Negative: [1] SEVERE abdominal pain (e.g., excruciating) AND [2] present > 1 hour    Negative: [1] SEVERE abdominal pain AND [2] age > 60    Negative: [1] Blood in the stool AND [2] moderate or large amount of blood    Negative: Black or tarry bowel movements  (Exception: chronic-unchanged  black-grey bowel movements AND is taking iron pills or Pepto-bismol)    Negative: [1] Drinking very little AND [2] dehydration suspected (e.g., no urine > 12 hours, very dry mouth, very lightheaded)    Negative: Patient sounds very sick or weak to the triager    Negative: [1] SEVERE diarrhea (e.g., 7 or more times / day more than normal) AND [2]  age > 60 years    Negative: [1] Constant abdominal pain AND [2] present > 2 hours    Negative: [1] Fever > 103 F (39.4 C) AND [2] not able to get the fever down using Fever Care Advice    Negative: [1] SEVERE " diarrhea (e.g., 7 or more times / day more than normal) AND [2] present > 24 hours (1 day)    Negative: [1] MODERATE diarrhea (e.g., 4-6 times / day more than normal) AND [2] present > 48 hours (2 days)    Negative: [1] MODERATE diarrhea (e.g., 4-6 times / day more than normal) AND [2] age > 70 years    Negative: Fever > 101 F (38.3 C)    Negative: Fever present > 3 days (72 hours)    Negative: Abdominal pain  (Exception: Pain clears with each passage of diarrhea stool)    Negative: [1] Blood in the stool AND [2] small amount of blood   (Exception: only on toilet paper. Reason: diarrhea can cause rectal irritation with blood on wiping)    Negative: [1] Mucus or pus in stool AND [2] present > 2 days AND [3] diarrhea is more than mild    Negative: [1] Recent antibiotic therapy (i.e., within last 2 months) AND [2] diarrhea present > 3 days since antibiotic was stopped    Negative: [1] Recent hospitalization AND [2] diarrhea present > 3 days    Negative: Weak immune system (e.g., HIV positive, cancer chemo, splenectomy, organ transplant, chronic steroids)    Negative: Tube feedings (e.g., nasogastric, g-tube, j-tube)    Negative: Travel to a foreign country in past month    Negative: [1] MILD diarrhea (e.g., 1-3 or more stools than normal in past 24 hours) without known cause AND [2] present >  7 days    Negative: Diarrhea is a chronic symptom (recurrent or ongoing AND present > 4 weeks)    Negative: SEVERE diarrhea (e.g., 7 or more times / day more than normal)    Negative: MILD-MODERATE diarrhea (e.g., 1-6 times / day more than normal)    [1] MILD diarrhea AND [2] taking antibiotics    Protocols used: DIARRHEA-A-

## 2019-06-22 NOTE — TELEPHONE ENCOUNTER
"Clinic Action Needed:FYI only, patient requesting Dr Bustillos be updated on her symptoms.     Reason for Call:  \"I am taking Bactrim and having a total knee replacement scheduled.\" Patient started Bactrim Wednesday 6/17/19 for UTI symptoms.  Patient reporting she has been having 2 loose stools per day since starting antibiotic. Afebrile.  Patient has history of c-diff. Taking fluids. Reporting intermittent abdominal cramping that is relieved with passing stool.   Patient's contact Phone     Cheri Genao RN  [facility]      "

## 2019-06-24 NOTE — TELEPHONE ENCOUNTER
Patient/parent is informed of MD note below, as it is written. Verbalized good understanding.    Patient states symptoms that are occurring now are soft formed stool, fast.  C-diff in past was pure liquid, mucous, very runny.      Patient will take last dose tonight of Bactrim, for a total of 6 days.     Verbalized good understanding.     Dr Apollo BENITO,  Zita Prince RN

## 2019-06-24 NOTE — TELEPHONE ENCOUNTER
Is this similar to the symptoms she has had in the past when she had c diff?    She can stop abx. 6 days of treatment should be adequate for uti.    Meghan Nieves MD

## 2019-06-24 NOTE — TELEPHONE ENCOUNTER
"See FNA notes below.  Call to patient, continues the Bactrim.  \"it is not diarrhea, it is soft and fast\" 2 episodes per day.   Patient was prescribe Bactrim 1 tab 2 times daily x 10 days.  Today is day 6 of antibiotic.  Patient has surgery tomorrow, TKR.  Should patient continue antibiotic after surgery?  Patient will be prescribe antibiotic post surgery, prophylactically.  Should she continue the Bactrim?   Patient is aware can have loose stools with antibiotic.  Patient has h/o C-diff.   Please advise  Zita Prince RN     "

## 2019-06-25 ENCOUNTER — TRANSFERRED RECORDS (OUTPATIENT)
Dept: HEALTH INFORMATION MANAGEMENT | Facility: CLINIC | Age: 64
End: 2019-06-25

## 2019-07-24 ENCOUNTER — OFFICE VISIT (OUTPATIENT)
Dept: URGENT CARE | Facility: URGENT CARE | Age: 64
End: 2019-07-24
Payer: COMMERCIAL

## 2019-07-24 VITALS
TEMPERATURE: 98.8 F | BODY MASS INDEX: 27.73 KG/M2 | RESPIRATION RATE: 18 BRPM | WEIGHT: 182.4 LBS | HEART RATE: 65 BPM | OXYGEN SATURATION: 100 % | SYSTOLIC BLOOD PRESSURE: 130 MMHG | DIASTOLIC BLOOD PRESSURE: 75 MMHG

## 2019-07-24 DIAGNOSIS — B37.0 THRUSH: Primary | ICD-10-CM

## 2019-07-24 DIAGNOSIS — Z96.651 STATUS POST TOTAL RIGHT KNEE REPLACEMENT: ICD-10-CM

## 2019-07-24 PROCEDURE — 99214 OFFICE O/P EST MOD 30 MIN: CPT | Performed by: INTERNAL MEDICINE

## 2019-07-24 RX ORDER — HYDROXYZINE HYDROCHLORIDE 25 MG/1
25-50 TABLET, FILM COATED ORAL
COMMUNITY
Start: 2019-06-27 | End: 2019-09-09

## 2019-07-24 RX ORDER — NYSTATIN 100000/ML
500000 SUSPENSION, ORAL (FINAL DOSE FORM) ORAL 4 TIMES DAILY
Qty: 280 ML | Refills: 0 | Status: SHIPPED | OUTPATIENT
Start: 2019-07-24 | End: 2019-09-09

## 2019-07-24 RX ORDER — HYDROCODONE BITARTRATE AND ACETAMINOPHEN 5; 325 MG/1; MG/1
TABLET ORAL
COMMUNITY
Start: 2019-06-27 | End: 2019-09-09

## 2019-07-24 NOTE — PROGRESS NOTES
SUBJECTIVE:  Bryanna Estevez is an 64 year old female who presents for two canker sores.  Also bit cheek about five or six days ago and continues to hurt.  Feels like might have a little flap on cheek where bit it. Had knee surgery about a month ago and did have abx for surgery there.  No fevers. No chills or sweats.  No n/v/d.   Has rinsed mouth with salt water which hasn't helped.      PMH:   has a past medical history of Acid reflux, Acne, Insomnia, and Rosacea.  Patient Active Problem List   Diagnosis     CARDIOVASCULAR SCREENING; LDL GOAL LESS THAN 160     Advanced directives, counseling/discussion     BMI 31.0-31.9,adult     GERD (gastroesophageal reflux disease)     Hyperlipidemia with target LDL less than 130     History of Clostridium difficile infection     Tear of meniscus of right knee as current injury, unspecified meniscus, unspecified tear type, subsequent encounter     Social History     Socioeconomic History     Marital status:      Spouse name: Not on file     Number of children: Not on file     Years of education: Not on file     Highest education level: Not on file   Occupational History     Not on file   Social Needs     Financial resource strain: Not on file     Food insecurity:     Worry: Not on file     Inability: Not on file     Transportation needs:     Medical: Not on file     Non-medical: Not on file   Tobacco Use     Smoking status: Never Smoker     Smokeless tobacco: Never Used   Substance and Sexual Activity     Alcohol use: No     Comment: rare     Drug use: No     Sexual activity: Yes     Partners: Male   Lifestyle     Physical activity:     Days per week: Not on file     Minutes per session: Not on file     Stress: Not on file   Relationships     Social connections:     Talks on phone: Not on file     Gets together: Not on file     Attends Gnosticist service: Not on file     Active member of club or organization: Not on file     Attends meetings of clubs or organizations: Not  on file     Relationship status: Not on file     Intimate partner violence:     Fear of current or ex partner: Not on file     Emotionally abused: Not on file     Physically abused: Not on file     Forced sexual activity: Not on file   Other Topics Concern     Parent/sibling w/ CABG, MI or angioplasty before 65F 55M? Not Asked   Social History Narrative     Not on file     Family History   Problem Relation Age of Onset     Heart Disease Father         BYPASS X 4 - VALVE REPAIR     Hypertension Father      Cancer Daughter         brain       ALLERGIES:  Penicillins; Amitriptyline; and Elavil [amitriptyline hcl]    Current Outpatient Medications   Medication     HYDROcodone-acetaminophen (NORCO) 5-325 MG tablet     hydrOXYzine (ATARAX) 25 MG tablet     No current facility-administered medications for this visit.          ROS:  ROS is done and is negative for general/constitutional, eye, ENT, Respiratory, cardiovascular, GI, , Skin, musculoskeletal except as noted elsewhere.  All other review of systems negative except as noted elsewhere.      OBJECTIVE:  /75   Pulse 65   Temp 98.8  F (37.1  C) (Oral)   Resp 18   Wt 82.7 kg (182 lb 6.4 oz)   SpO2 100%   BMI 27.73 kg/m    GENERAL APPEARANCE: Alert, in no acute distress  EYES: normal  NOSE:normal  OROPHARYNX: in buccal mucosa there are four lesions, mildly ulcerated with small amount of white plaque present- one lesion on right upper front, one on left upper front, and two on right upper posterior region.  No surrounding erythema, no drainage.  NECK:No adenopathy,masses or thyromegaly  RESP: normal and clear to auscultation  CV:regular rate and rhythm and no murmurs, clicks, or gallops  ABDOMEN: Abdomen soft, non-tender. BS normal. No masses, organomegaly  SKIN: no ulcers, lesions or rash.  Incision from tka appears to be healing well with no drainage or erythema.  MUSCULOSKELETAL:Musculoskeletal normal      RESULTS  .  No results found for this or any  previous visit (from the past 48 hour(s)).    ASSESSMENT/PLAN:    ASSESSMENT / PLAN:  (B37.0) Thrush  (primary encounter diagnosis)  Comment: suspect pt has thrush.  May be secondary to abx received for surgery and post-op  Plan: nystatin (MYCOSTATIN) 406371 UNIT/ML suspension        Reviewed medication instructions and side effects. Follow up if experiences side effects.. I reviewed supportive care, otc meds to use if needed, expected course, and signs of concern.  Follow up as needed or if she does not improve within 10 day(s) or if worsens in any way.  Reviewed red flag symptoms and is to go to the ER if experiences any of these.    (Z96.651) Status post total right knee replacement  Comment: currently no sign of infection of incision  Plan: continue with routine f/u with ortho and with pt.      See [facility] for orders, medications, letters, patient instructions    Jaja Carpenter M.D.

## 2019-07-26 ENCOUNTER — TELEPHONE (OUTPATIENT)
Dept: FAMILY MEDICINE | Facility: CLINIC | Age: 64
End: 2019-07-26

## 2019-07-26 NOTE — TELEPHONE ENCOUNTER
"Patient saw Jaja Phoenix For mouth sores. She is very sore and it is hard to drink or eat.  The Pharmacy mentioned something called \"Magic\" that might help with the pain. Can she get something called in for this? She is using an oral rinse 4xs a day. She doesn't feel like its helping much and that it burns. It also leaves a bad taste in her mouth. Please advise. Ok to leave a detailed message.    Patient also had a knee replacement one month ago, and she needs to be careful of infection.  Does she need something different?    "

## 2019-07-29 ENCOUNTER — TELEPHONE (OUTPATIENT)
Dept: FAMILY MEDICINE | Facility: CLINIC | Age: 64
End: 2019-07-29

## 2019-07-29 ENCOUNTER — OFFICE VISIT (OUTPATIENT)
Dept: FAMILY MEDICINE | Facility: CLINIC | Age: 64
End: 2019-07-29
Payer: COMMERCIAL

## 2019-07-29 VITALS
RESPIRATION RATE: 18 BRPM | HEIGHT: 68 IN | TEMPERATURE: 98.2 F | BODY MASS INDEX: 27.58 KG/M2 | DIASTOLIC BLOOD PRESSURE: 68 MMHG | WEIGHT: 182 LBS | HEART RATE: 63 BPM | SYSTOLIC BLOOD PRESSURE: 122 MMHG | OXYGEN SATURATION: 99 %

## 2019-07-29 DIAGNOSIS — K13.79 MOUTH SORES: Primary | ICD-10-CM

## 2019-07-29 PROCEDURE — 99213 OFFICE O/P EST LOW 20 MIN: CPT | Performed by: FAMILY MEDICINE

## 2019-07-29 RX ORDER — SUCRALFATE ORAL 1 G/10ML
1 SUSPENSION ORAL 4 TIMES DAILY PRN
Qty: 420 ML | Refills: 0 | Status: SHIPPED | OUTPATIENT
Start: 2019-07-29 | End: 2019-09-09

## 2019-07-29 RX ORDER — LIDOCAINE HYDROCHLORIDE 20 MG/ML
15 SOLUTION OROPHARYNGEAL EVERY 6 HOURS PRN
Qty: 100 ML | Refills: 0 | Status: SHIPPED | OUTPATIENT
Start: 2019-07-29 | End: 2021-02-18

## 2019-07-29 ASSESSMENT — MIFFLIN-ST. JEOR: SCORE: 1424.05

## 2019-07-29 NOTE — PROGRESS NOTES
"SUBJECTIVE:  Bryanna Estevez, a 64 year old female scheduled an appointment to discuss the following issues:  Trush/mouth sores.  Seen in urgent care 5 days ago and given prescription for nystatin.   History canker sores but not this bad in past and no history thrush.   No history dm. No steroids. Some chills but not fevers. No rashes elsewhere.   Noted 2 weeks - worse and more now.   No dentist in years. Some teeth pain.   Had knee replacement 5 weeks ago.   Was on antibiotics for uti - asymptomatic at time and given antibiotic for surgery.   History c.diff. No diarrhea.     Medical, social, surgical, and family histories reviewed.    ROS:  All other ROS negative.    OBJECTIVE:  /68   Pulse 63   Temp 98.2  F (36.8  C) (Oral)   Resp 18   Ht 1.727 m (5' 8\")   Wt 82.6 kg (182 lb)   SpO2 99%   BMI 27.67 kg/m    EXAM:  GENERAL APPEARANCE: healthy, alert and no distress  EYES: EOMI,  PERRL  HENT: ear canals and TM's normal and nose and mouth without ulcers or lesions  HENT: scattered shallow <1/2 ulcerations in muscous membranes of mouth. No surrounding redness/pus and no white discharge or plagues noted.   NECK: no adenopathy, no asymmetry, masses, or scars and thyroid normal to palpation  SKIN: no suspicious lesions or rashes  PSYCH: mentation appears normal and affect normal/bright    ASSESSMENT / PLAN:  (K13.79) Mouth sores  (primary encounter diagnosis)  Comment: looks more like cranker sores than thrust  Plan: sucralfate (CARAFATE) 1 GM/10ML suspension,         lidocaine HCl (XYLOCAINE) 2 % solution,         OTOLARYNGOLOGY REFERRAL        Reveiwed risks and side effects of medication  Baking soda/water swich and avoid spicy/hot/acidic foods. Follow-up ENT for second opinion if not improving overall in next 1-2weeks - sooner if worse. Expected course and warning signs reviewed. .Call/email with questions/concerns.     Ziyad Adrian MD        "

## 2019-07-29 NOTE — NURSING NOTE
"Chief Complaint   Patient presents with     Mouth/Lip Problem       Initial /68   Pulse 63   Temp 98.2  F (36.8  C) (Oral)   Resp 18   Ht 1.727 m (5' 8\")   Wt 82.6 kg (182 lb)   SpO2 99%   BMI 27.67 kg/m   Estimated body mass index is 27.67 kg/m  as calculated from the following:    Height as of this encounter: 1.727 m (5' 8\").    Weight as of this encounter: 82.6 kg (182 lb).    Mady Keller, CMA    "

## 2019-07-29 NOTE — NURSING NOTE
[facility] and the last Mammogram was 2016.    Mady Keller, CMA

## 2019-07-29 NOTE — TELEPHONE ENCOUNTER
Patient is getting more sore under the tongue now and along the gums all top and bottom of lip and on her right side of cheek big ones there.  She is taking Nystatin swish and swallow and has not seen any improvement after 5 days and states she is actually getting worse. Hard to swallow and only eating ice cream and soup.  She is urinating every 8 hours.   Weeks post op from total knee. She can't drive and  isn't done with work until 5:00 pm.      Nursing advice:  Patient to be seen for revaluation.  Patient was given the appointment listed below and will enlist the help of a family member.  She is to continue good hydration, avoid spicy/hot/acid liquids and is to take her next dose of medication until seen by Dr. Adrian.  Patient/parent verbalizes good understanding, agrees with plan and states she needs no further support. Rowan Hurt R.N.    Next 5 appointments (look out 90 days)    Jul 29, 2019  2:15 PM CDT  SHORT with Ziyad Adrian MD  [facility] [address] [facility] [address] [phone]

## 2019-07-29 NOTE — TELEPHONE ENCOUNTER
Panel Management Review      Patient has the following on her problem list: None      Composite cancer screening  Chart review shows that this patient is due/due soon for the following Mammogram  Summary:    Patient is due/failing the following:   MAMMOGRAM    Action needed:   Patient needs office visit for Mammogram.    Type of outreach:    Sent [employer] message.    Questions for provider review:    None                                                                                                                                    Mady Keller CMA     Chart routed to 0 .

## 2019-07-31 ENCOUNTER — TELEPHONE (OUTPATIENT)
Dept: OTOLARYNGOLOGY | Facility: CLINIC | Age: 64
End: 2019-07-31

## 2019-07-31 ENCOUNTER — TELEPHONE (OUTPATIENT)
Dept: FAMILY MEDICINE | Facility: CLINIC | Age: 64
End: 2019-07-31

## 2019-07-31 NOTE — TELEPHONE ENCOUNTER
Patient calling. She was seen recently for cold sores. She started medication on 7/29 but is not seeing any relief. States they are all over her mouth and lips and that she can only eat liquid food.     She called to make an appointment with ENT. I checked several locations and there is nothing with an ENT for a couple weeks. She is wondering if there are other ENTs that Dr. Adrian would suggest, or if he has other treatment options for her.

## 2019-07-31 NOTE — TELEPHONE ENCOUNTER
[facility] can get pt in on 8/14/19 and is sending a message out to the providers to see if they can get her in sooner.  No sooner appointment's at [place], [place], or  [place], or [place].  Pt does not feel she can wait that long to be seen.   Mady ELENAN, RN

## 2019-07-31 NOTE — TELEPHONE ENCOUNTER
Pt called back to add to message that she now has sores under her tongue and has a skin tag about the size of a pea under her tongue.  Mady Myers BSN, RN

## 2019-07-31 NOTE — TELEPHONE ENCOUNTER
Pt advised to try calling [facility] [phone] for an appointment.  If they do not have something in the next week call me back at [phone]  Mady ELENAN, RN

## 2019-08-01 NOTE — TELEPHONE ENCOUNTER
Pt has not heard back from [facility] message.    Does pt need to be seen sooner? If so can you do staff message to them asking for earlier appointment?  Mady ELENAN, RN

## 2019-08-01 NOTE — TELEPHONE ENCOUNTER
Returned call to patient. Offered Dr. White's 3:00pm slot tomorrow, Friday 8/2. Took patient off of Dr. Salazar's wait list as well.     Marlene Da Silva LPN

## 2019-08-02 ENCOUNTER — OFFICE VISIT (OUTPATIENT)
Dept: OTOLARYNGOLOGY | Facility: CLINIC | Age: 64
End: 2019-08-02
Payer: COMMERCIAL

## 2019-08-02 VITALS — SYSTOLIC BLOOD PRESSURE: 146 MMHG | OXYGEN SATURATION: 100 % | DIASTOLIC BLOOD PRESSURE: 79 MMHG | HEART RATE: 68 BPM

## 2019-08-02 DIAGNOSIS — K12.30 STOMATITIS AND MUCOSITIS: Primary | ICD-10-CM

## 2019-08-02 DIAGNOSIS — K12.1 STOMATITIS AND MUCOSITIS: Primary | ICD-10-CM

## 2019-08-02 LAB
BASOPHILS # BLD AUTO: 0 10E9/L (ref 0–0.2)
BASOPHILS NFR BLD AUTO: 0.3 %
CRP SERPL-MCNC: 13.1 MG/L (ref 0–8)
DIFFERENTIAL METHOD BLD: NORMAL
EOSINOPHIL # BLD AUTO: 0.1 10E9/L (ref 0–0.7)
EOSINOPHIL NFR BLD AUTO: 1 %
ERYTHROCYTE [DISTWIDTH] IN BLOOD BY AUTOMATED COUNT: 11.8 % (ref 10–15)
HCT VFR BLD AUTO: 38.4 % (ref 35–47)
HGB BLD-MCNC: 12.9 G/DL (ref 11.7–15.7)
IMM GRANULOCYTES # BLD: 0 10E9/L (ref 0–0.4)
IMM GRANULOCYTES NFR BLD: 0.4 %
LYMPHOCYTES # BLD AUTO: 1.7 10E9/L (ref 0.8–5.3)
LYMPHOCYTES NFR BLD AUTO: 17.8 %
MCH RBC QN AUTO: 30.9 PG (ref 26.5–33)
MCHC RBC AUTO-ENTMCNC: 33.6 G/DL (ref 31.5–36.5)
MCV RBC AUTO: 92 FL (ref 78–100)
MONOCYTES # BLD AUTO: 0.6 10E9/L (ref 0–1.3)
MONOCYTES NFR BLD AUTO: 5.8 %
NEUTROPHILS # BLD AUTO: 7.1 10E9/L (ref 1.6–8.3)
NEUTROPHILS NFR BLD AUTO: 74.7 %
PLATELET # BLD AUTO: 295 10E9/L (ref 150–450)
RBC # BLD AUTO: 4.17 10E12/L (ref 3.8–5.2)
WBC # BLD AUTO: 9.5 10E9/L (ref 4–11)

## 2019-08-02 PROCEDURE — 99203 OFFICE O/P NEW LOW 30 MIN: CPT | Performed by: OTOLARYNGOLOGY

## 2019-08-02 PROCEDURE — 86140 C-REACTIVE PROTEIN: CPT | Performed by: OTOLARYNGOLOGY

## 2019-08-02 PROCEDURE — 86038 ANTINUCLEAR ANTIBODIES: CPT | Performed by: OTOLARYNGOLOGY

## 2019-08-02 PROCEDURE — 86431 RHEUMATOID FACTOR QUANT: CPT | Performed by: OTOLARYNGOLOGY

## 2019-08-02 PROCEDURE — 85025 COMPLETE CBC W/AUTO DIFF WBC: CPT | Performed by: OTOLARYNGOLOGY

## 2019-08-02 PROCEDURE — 36415 COLL VENOUS BLD VENIPUNCTURE: CPT | Performed by: OTOLARYNGOLOGY

## 2019-08-02 RX ORDER — METHYLPREDNISOLONE 4 MG
TABLET, DOSE PACK ORAL
Qty: 21 TABLET | Refills: 0 | Status: SHIPPED | OUTPATIENT
Start: 2019-08-02 | End: 2019-08-24

## 2019-08-02 ASSESSMENT — ENCOUNTER SYMPTOMS
TREMORS: 0
HEARTBURN: 0
NAUSEA: 0
BRUISES/BLEEDS EASILY: 0
DIZZINESS: 0
DOUBLE VISION: 0
SORE THROAT: 1
VOMITING: 0
HEADACHES: 0
STRIDOR: 0
CONSTITUTIONAL NEGATIVE: 1
BLURRED VISION: 0
SINUS PAIN: 0
PHOTOPHOBIA: 0
TINGLING: 0

## 2019-08-02 ASSESSMENT — PAIN SCALES - GENERAL: PAINLEVEL: EXTREME PAIN (9)

## 2019-08-02 NOTE — LETTER
8/2/2019         RE: Bryanna Estevez  [address] [facility] [address]        Dear Colleague,    Thank you for referring your patient, Bryanna Estevez, to the [facility]. Please see a copy of my visit note below.    HPI    This is a 64 year old patient who has been having mouth sores for the past several days. States difficulty swallowing and chewing due to the lesions appeared in her mouth. She did have a knee surgery in June. She denies any fever, genital lesions, vision issues, autoimmune diseases, or immunocompromised issues. She tried topical numbing, antifungal, antihistaminic medications with no benefit. She is not on any medications other than hydrocodone and hydroxyzine.      Review of Systems   Constitutional: Negative.    HENT: Positive for sore throat. Negative for congestion, ear discharge, ear pain, hearing loss, nosebleeds, sinus pain and tinnitus.    Eyes: Negative for blurred vision, double vision and photophobia.   Respiratory: Negative for stridor.    Gastrointestinal: Negative for heartburn, nausea and vomiting.   Skin: Negative.    Neurological: Negative for dizziness, tingling, tremors and headaches.   Endo/Heme/Allergies: Negative for environmental allergies. Does not bruise/bleed easily.         Physical Exam   Constitutional: She appears well-developed and well-nourished.   HENT:   Head: Normocephalic and atraumatic.   Right Ear: Hearing, tympanic membrane, external ear and ear canal normal. No drainage, swelling or tenderness. No middle ear effusion. No decreased hearing is noted.   Left Ear: Hearing, tympanic membrane, external ear and ear canal normal. No drainage, swelling or tenderness.  No middle ear effusion. No decreased hearing is noted.   Nose: Nose normal. No mucosal edema, rhinorrhea, nose lacerations, sinus tenderness or septal deviation.   Mouth/Throat: Uvula is midline and mucous membranes are normal. Oral lesions present.   Eyes: Pupils are  equal, round, and reactive to light. EOM are normal.   Neck: Normal range of motion. Neck supple.    Several aphthous stomatitis located buccal mucosa, floor of mouth, tongue, soft palate.    A/P  This is a 64 year old patient with multiple aphthous stomatitis with no other mucosal lesions. Pathogenesis of the problem explained. She was given good hydration, short term steroid treatment. In the meantime, a CBC, CRP, RACHID, RF requested. Her questions were answered.        Again, thank you for allowing me to participate in the care of your patient.        Sincerely,        Pavel Scott MD

## 2019-08-02 NOTE — PROGRESS NOTES
HPI    This is a 64 year old patient who has been having mouth sores for the past several days. States difficulty swallowing and chewing due to the lesions appeared in her mouth. She did have a knee surgery in June. She denies any fever, genital lesions, vision issues, autoimmune diseases, or immunocompromised issues. She tried topical numbing, antifungal, antihistaminic medications with no benefit. She is not on any medications other than hydrocodone and hydroxyzine.      Review of Systems   Constitutional: Negative.    HENT: Positive for sore throat. Negative for congestion, ear discharge, ear pain, hearing loss, nosebleeds, sinus pain and tinnitus.    Eyes: Negative for blurred vision, double vision and photophobia.   Respiratory: Negative for stridor.    Gastrointestinal: Negative for heartburn, nausea and vomiting.   Skin: Negative.    Neurological: Negative for dizziness, tingling, tremors and headaches.   Endo/Heme/Allergies: Negative for environmental allergies. Does not bruise/bleed easily.         Physical Exam   Constitutional: She appears well-developed and well-nourished.   HENT:   Head: Normocephalic and atraumatic.   Right Ear: Hearing, tympanic membrane, external ear and ear canal normal. No drainage, swelling or tenderness. No middle ear effusion. No decreased hearing is noted.   Left Ear: Hearing, tympanic membrane, external ear and ear canal normal. No drainage, swelling or tenderness.  No middle ear effusion. No decreased hearing is noted.   Nose: Nose normal. No mucosal edema, rhinorrhea, nose lacerations, sinus tenderness or septal deviation.   Mouth/Throat: Uvula is midline and mucous membranes are normal. Oral lesions present.   Eyes: Pupils are equal, round, and reactive to light. EOM are normal.   Neck: Normal range of motion. Neck supple.    Several aphthous stomatitis located buccal mucosa, floor of mouth, tongue, soft palate.    A/P  This is a 64 year old patient with multiple aphthous  stomatitis with no other mucosal lesions. Pathogenesis of the problem explained. She was given good hydration, short term steroid treatment. In the meantime, a CBC, CRP, RACHID, RF requested. Her questions were answered.

## 2019-08-02 NOTE — NURSING NOTE
Bryanna Estevez's goals for this visit include:   Chief Complaint   Patient presents with     Consult     Mouth sores     She requests these members of her care team be copied on today's visit information: Yes    PCP: Meghan Bustillos    Referring Provider:  No referring provider defined for this encounter.    BP (!) 146/79 (BP Location: Right arm, Patient Position: Sitting, Cuff Size: Adult Regular)   Pulse 68   SpO2 100%     Do you need any medication refills at today's visit? No    Marlene Da Silva LPN

## 2019-08-05 ENCOUNTER — TELEPHONE (OUTPATIENT)
Dept: OTOLARYNGOLOGY | Facility: CLINIC | Age: 64
End: 2019-08-05

## 2019-08-05 LAB
ANA SER QL IF: NEGATIVE
RHEUMATOID FACT SER NEPH-ACNC: <20 IU/ML (ref 0–20)

## 2019-08-05 NOTE — TELEPHONE ENCOUNTER
Phone call to pt with lab results.  Pt states she has started medrol dose pack today.  Asked pt to call clinic if symptoms are not improved after medication is complete.  Cheri Fuentes RN

## 2019-08-05 NOTE — TELEPHONE ENCOUNTER
[facility] Call Center    Phone Message    May a detailed message be left on voicemail: yes    Reason for Call: Other: Requesting results from 8/2/19. Please advise     Action Taken: Message routed to:  Adult Clinics: ENT p [phone]

## 2019-08-24 ENCOUNTER — OFFICE VISIT (OUTPATIENT)
Dept: URGENT CARE | Facility: URGENT CARE | Age: 64
End: 2019-08-24
Payer: COMMERCIAL

## 2019-08-24 VITALS
TEMPERATURE: 97.9 F | DIASTOLIC BLOOD PRESSURE: 79 MMHG | HEIGHT: 69 IN | RESPIRATION RATE: 16 BRPM | HEART RATE: 70 BPM | SYSTOLIC BLOOD PRESSURE: 137 MMHG | BODY MASS INDEX: 25.55 KG/M2 | WEIGHT: 172.5 LBS | OXYGEN SATURATION: 97 %

## 2019-08-24 DIAGNOSIS — K13.70 ORAL MUCOSAL LESION: Primary | ICD-10-CM

## 2019-08-24 DIAGNOSIS — J02.9 SORE THROAT: ICD-10-CM

## 2019-08-24 LAB
DEPRECATED S PYO AG THROAT QL EIA: NORMAL
SPECIMEN SOURCE: NORMAL

## 2019-08-24 PROCEDURE — 36415 COLL VENOUS BLD VENIPUNCTURE: CPT | Performed by: INTERNAL MEDICINE

## 2019-08-24 PROCEDURE — 87081 CULTURE SCREEN ONLY: CPT | Performed by: INTERNAL MEDICINE

## 2019-08-24 PROCEDURE — 87389 HIV-1 AG W/HIV-1&-2 AB AG IA: CPT | Performed by: INTERNAL MEDICINE

## 2019-08-24 PROCEDURE — 99214 OFFICE O/P EST MOD 30 MIN: CPT | Performed by: INTERNAL MEDICINE

## 2019-08-24 PROCEDURE — 86618 LYME DISEASE ANTIBODY: CPT | Performed by: INTERNAL MEDICINE

## 2019-08-24 PROCEDURE — 87880 STREP A ASSAY W/OPTIC: CPT | Performed by: INTERNAL MEDICINE

## 2019-08-24 PROCEDURE — 86038 ANTINUCLEAR ANTIBODIES: CPT | Performed by: INTERNAL MEDICINE

## 2019-08-24 RX ORDER — FLUCONAZOLE 100 MG/1
200 TABLET ORAL DAILY
Qty: 28 TABLET | Refills: 0 | Status: SHIPPED | OUTPATIENT
Start: 2019-08-24 | End: 2019-09-09

## 2019-08-24 ASSESSMENT — MIFFLIN-ST. JEOR: SCORE: 1396.83

## 2019-08-24 NOTE — PROGRESS NOTES
SUBJECTIVE:  Bryanna Estevez is an 64 year old female who presents for sore throat and oral sores.  Was seen about a month ago and started on nystatin suspension which didn't help.  Then saw a pcp and put on carafate and lidocaine and referred to ENT and had labs done which did not show a cause and was put on predisone which didn't really help.  Was sent to dentist who sent her to an oral surgeon and has seen him three times.   And was given some magic mouth wash and discussed having a biopsy and put on another round of prednisone 80 mg daily for seven days which completed three days ago.  No fevers, but occasional chills.  Some runny nose and congestion.  Had knee replacement June 25th and was on abx during the surgery but not afterwards.  Did have some abx for a possible uti prior to surgery.  No recent travel.  Did not have any sores before having surgery.  The sores started on July 18th.  sxs have not improved.  The prednisone maybe helped some of the sores a little but throat is more sore now.  Has lost some weight because of pain with eating or swallowing.    PMH:   has a past medical history of Acid reflux, Acne, Insomnia, and Rosacea.  Patient Active Problem List   Diagnosis     CARDIOVASCULAR SCREENING; LDL GOAL LESS THAN 160     Advanced directives, counseling/discussion     BMI 31.0-31.9,adult     GERD (gastroesophageal reflux disease)     Hyperlipidemia with target LDL less than 130     History of Clostridium difficile infection     Tear of meniscus of right knee as current injury, unspecified meniscus, unspecified tear type, subsequent encounter     Social History     Socioeconomic History     Marital status:      Spouse name: None     Number of children: None     Years of education: None     Highest education level: None   Occupational History     None   Social Needs     Financial resource strain: None     Food insecurity:     Worry: None     Inability: None     Transportation needs:      "Medical: None     Non-medical: None   Tobacco Use     Smoking status: Never Smoker     Smokeless tobacco: Never Used   Substance and Sexual Activity     Alcohol use: No     Comment: rare     Drug use: No     Sexual activity: Yes     Partners: Male   Lifestyle     Physical activity:     Days per week: None     Minutes per session: None     Stress: None   Relationships     Social connections:     Talks on phone: None     Gets together: None     Attends Mormonism service: None     Active member of club or organization: None     Attends meetings of clubs or organizations: None     Relationship status: None     Intimate partner violence:     Fear of current or ex partner: None     Emotionally abused: None     Physically abused: None     Forced sexual activity: None   Other Topics Concern     Parent/sibling w/ CABG, MI or angioplasty before 65F 55M? Not Asked   Social History Narrative     None     Family History   Problem Relation Age of Onset     Heart Disease Father         BYPASS X 4 - VALVE REPAIR     Hypertension Father      Cancer Daughter         brain       ALLERGIES:  Penicillins; Amitriptyline; and Elavil [amitriptyline hcl]    Current Outpatient Medications   Medication     HYDROcodone-acetaminophen (NORCO) 5-325 MG tablet     hydrOXYzine (ATARAX) 25 MG tablet     lidocaine HCl (XYLOCAINE) 2 % solution     methylPREDNISolone (MEDROL DOSEPAK) 4 MG tablet therapy pack     sucralfate (CARAFATE) 1 GM/10ML suspension     No current facility-administered medications for this visit.          ROS:  ROS is done and is negative for general/constitutional, eye, ENT, Respiratory, cardiovascular, GI, , Skin, musculoskeletal except as noted elsewhere.  All other review of systems negative except as noted elsewhere.      OBJECTIVE:  /79 (BP Location: Right arm, Cuff Size: Adult Regular)   Pulse 70   Temp 97.9  F (36.6  C) (Oral)   Resp 16   Ht 1.727 m (5' 8\")   Wt 78.2 kg (172 lb 8 oz)   SpO2 97%   BMI 26.23 " kg/m    GENERAL APPEARANCE: Alert, in no acute distress  EYES: normal  EARS: External ears normal. Canals clear. TM's normal.  NOSE: mild edema of nasal mucosa on right  OROPHARYNX:diffuse areas of moderate erythema throughout mouth with scattered shallow ulcers and one 1cm area of white plaque on left buccal mucosa, no bleeding.  Mild posterior erythema  NECK:No adenopathy,masses or thyromegaly  RESP: normal and clear to auscultation  CV:regular rate and rhythm and no murmurs, clicks, or gallops  ABDOMEN: Abdomen soft, non-tender. BS normal. No masses, organomegaly  SKIN: no ulcers, lesions or rash  MUSCULOSKELETAL:Musculoskeletal normal      RESULTS  Results for orders placed or performed in visit on 08/24/19   Strep, Rapid Screen   Result Value Ref Range    Specimen Description Throat     Rapid Strep A Screen       NEGATIVE: No Group A streptococcal antigen detected by immunoassay, await culture report.   .  No results found for this or any previous visit (from the past 48 hour(s)).    ASSESSMENT/PLAN:    ASSESSMENT / PLAN:  (K13.70) Oral mucosal lesions  (primary encounter diagnosis)  Comment: pt/s sxs have not improved much with any of the treatments she has had.  Given recent high dose prednisone, her risk for candida may be increased and as has white patch present in one location, will tx with oral fluconazole to cover for candida.  She is to f/u with oral surgeon as scheduled, and proceed with biopsy if needed.  Has had cbc, crp, and rheum factor checked, but will check some additional labs today.  Currently not appear c/w herpes.   Plan: HIV Antigen Antibody Combo, Anti Nuclear Erica         IgG by IFA with Reflex, fluconazole (DIFLUCAN)         100 MG tablet, Lyme Disease Erica with reflex to         WB Serum        Reviewed medication instructions and side effects. Follow up if experiences side effects.. I reviewed supportive care, otc meds to use if needed, expected course, and signs of concern.  Follow up  as schedule this coming week with oral surgeon.  F/u sooner if worsens in any way.  Reviewed red flag symptoms and is to go to the ER if experiences any of these.  Also, continue magic mouthwash prn.  If sxs persist and upcoming probable biopsy with oral surgeon does not reveal a cause, consider additional work up for other etiologies such as Bechets    (J02.9) Sore throat  Comment: negative rapid strep.  See above plan as pain is related to inflammation and oral lesions  Plan: Strep, Rapid Screen, Beta strep group A culture        Await strep culture and treat if positive.      See [facility] for orders, medications, letters, patient instructions    Jaja Carpenter M.D.

## 2019-08-25 LAB
BACTERIA SPEC CULT: NORMAL
SPECIMEN SOURCE: NORMAL

## 2019-08-26 LAB
B BURGDOR IGG+IGM SER QL: 0.08 (ref 0–0.89)
HIV 1+2 AB+HIV1 P24 AG SERPL QL IA: NONREACTIVE

## 2019-08-27 LAB — ANA SER QL IF: NEGATIVE

## 2019-09-09 ENCOUNTER — OFFICE VISIT (OUTPATIENT)
Dept: FAMILY MEDICINE | Facility: CLINIC | Age: 64
End: 2019-09-09
Payer: COMMERCIAL

## 2019-09-09 VITALS
SYSTOLIC BLOOD PRESSURE: 122 MMHG | WEIGHT: 174.2 LBS | BODY MASS INDEX: 25.72 KG/M2 | TEMPERATURE: 98.1 F | DIASTOLIC BLOOD PRESSURE: 77 MMHG | RESPIRATION RATE: 16 BRPM | HEART RATE: 68 BPM

## 2019-09-09 DIAGNOSIS — M25.661 DECREASED RANGE OF MOTION (ROM) OF RIGHT KNEE: ICD-10-CM

## 2019-09-09 DIAGNOSIS — Z01.818 PREOP GENERAL PHYSICAL EXAM: Primary | ICD-10-CM

## 2019-09-09 PROCEDURE — 99214 OFFICE O/P EST MOD 30 MIN: CPT | Performed by: FAMILY MEDICINE

## 2019-09-09 PROCEDURE — 93000 ELECTROCARDIOGRAM COMPLETE: CPT | Performed by: FAMILY MEDICINE

## 2019-09-09 NOTE — NURSING NOTE
"Chief Complaint   Patient presents with     Pre-Op Exam       Initial /77   Pulse 68   Temp 98.1  F (36.7  C) (Oral)   Resp 16   Wt 79 kg (174 lb 3.2 oz)   BMI 25.72 kg/m   Estimated body mass index is 25.72 kg/m  as calculated from the following:    Height as of 8/24/19: 1.753 m (5' 9\").    Weight as of this encounter: 79 kg (174 lb 3.2 oz).  Medication Reconciliation: complete  Raghavendra Michael CMA    "

## 2019-09-09 NOTE — PROGRESS NOTES
[facility]  [address] [facility] [address] [phone]  Dept: [phone]    PRE-OP EVALUATION:  Today's date: 2019    Bryanna Estevez (: 1955) presents for pre-operative evaluation assessment as requested by Dr. John .  She requires evaluation and anesthesia risk assessment prior to undergoing surgery/procedure for treatment of right knee  .    Fax number for surgical facility: ([facility])   Primary Physician: Meghan Bustillos  Type of Anesthesia Anticipated: to be determined    Patient has a Health Care Directive or Living Will:  NO    Preop Questions 2019   Who is doing your surgery? dr john   What are you having done? anesthesia manipulation of right knee   Date of Surgery/Procedure: 2019   Facility or Hospital where procedure/surgery will be performed: [facility]   1.  Do you have a history of Heart attack, stroke, stent, coronary bypass surgery, or other heart surgery? No   2.  Do you ever have any pain or discomfort in your chest? No   3.  Do you have a history of  Heart Failure? No   4.   Are you troubled by shortness of breath when:  walking on a level surface, or up a slight hill, or at night? No   5.  Do you currently have a cold, bronchitis or other respiratory infection? No   6.  Do you have a cough, shortness of breath, or wheezing? No   7.  Do you sometimes get pains in the calves of your legs when you walk? No   8. Do you or anyone in your family have previous history of blood clots? No   9.  Do you or does anyone in your family have a serious bleeding problem such as prolonged bleeding following surgeries or cuts? No   10. Have you ever had problems with anemia or been told to take iron pills? No   11. Have you had any abnormal blood loss such as black, tarry or bloody stools, or abnormal vaginal bleeding? No   12. Have you ever had a blood transfusion? No   13. Have you or any of your relatives ever had problems with  anesthesia? YES - nausea   14. Do you have sleep apnea, excessive snoring or daytime drowsiness? No   15. Do you have any prosthetic heart valves? No   16. Do you have prosthetic joints? YES - right knee   17. Is there any chance that you may be pregnant? No         HPI:     HPI related to upcoming procedure: 2.5 months s/p right knee replacement, now with poor range of motion, will undergo right knee exam/manipulation under anesthesia to improve range of motion.        See problem list for active medical problems.  Problems all longstanding and stable, except as noted/documented.  See ROS for pertinent symptoms related to these conditions.      MEDICAL HISTORY:     Patient Active Problem List    Diagnosis Date Noted     Tear of meniscus of right knee as current injury, unspecified meniscus, unspecified tear type, subsequent encounter 08/24/2018     Priority: Medium     History of Clostridium difficile infection 11/12/2014     Priority: Medium     Hyperlipidemia with target LDL less than 130 09/09/2014     Priority: Medium     Diagnosis updated by automated process. Provider to review and confirm.       GERD (gastroesophageal reflux disease) 05/24/2011     Priority: Medium     Advanced directives, counseling/discussion 05/16/2011     Priority: Medium     Advance Directive Problem List Overview:   Name Relationship Phone    Primary Health Care Agent            Alternative Health Care Agent          Discussed advance care planning with patient; information given to patient to review. 5/16/2011          BMI 31.0-31.9,adult 05/16/2011     Priority: Medium     CARDIOVASCULAR SCREENING; LDL GOAL LESS THAN 160 10/31/2010     Priority: Medium      Past Medical History:   Diagnosis Date     Acid reflux      Acne      Insomnia      Rosacea      Past Surgical History:   Procedure Laterality Date     ARTHROSCOPY KNEE RT/LT      both knees     C BIOPSY OF STOMACH,BY LAPAROTOMY      exp     C CREATE NEW TUBAL OPENING       reversal tubal ligation     HC REPAIR ACHILLES TENDON,PRIMARY      both     TUBAL LIGATION       Current Outpatient Medications   Medication Sig Dispense Refill     lidocaine HCl (XYLOCAINE) 2 % solution Swish and spit 15 mLs in mouth every 6 hours as needed for moderate pain ; Max 8 doses/24 hour period. 100 mL 0     OTC products: None, except as noted above    Allergies   Allergen Reactions     Penicillins Anaphylaxis     Amitriptyline      Other reaction(s): *Unknown     Elavil [Amitriptyline Hcl]       Latex Allergy: YES: Precautions to take: sensitive to tape and bandaid, avoid latex, use paper tape only    Social History     Tobacco Use     Smoking status: Never Smoker     Smokeless tobacco: Never Used   Substance Use Topics     Alcohol use: No     Comment: rare     History   Drug Use No       REVIEW OF SYSTEMS:   Constitutional, neuro, ENT, endocrine, pulmonary, cardiac, gastrointestinal, genitourinary, musculoskeletal, integument and psychiatric systems are negative, except as otherwise noted.    EXAM:   /77   Pulse 68   Temp 98.1  F (36.7  C) (Oral)   Resp 16   Wt 79 kg (174 lb 3.2 oz)   BMI 25.72 kg/m      GENERAL APPEARANCE: healthy, alert and no distress     EYES: EOMI, PERRL     HENT: ear canals and TM's normal and nose and mouth without ulcers or lesions     NECK: no adenopathy, no asymmetry, masses, or scars and thyroid normal to palpation     RESP: lungs clear to auscultation - no rales, rhonchi or wheezes     CV: regular rates and rhythm, normal S1 S2, no S3 or S4 and no murmur, click or rub     ABDOMEN:  soft, nontender, no HSM or masses and bowel sounds normal     MS: extremities normal- no gross deformities noted, no evidence of inflammation in joints, FROM in all extremities.     SKIN: no suspicious lesions or rashes     NEURO: Normal strength and tone, sensory exam grossly normal, mentation intact, speech normal and cranial nerves 2-12 intact     PSYCH: mentation appears normal. and  affect normal/bright    DIAGNOSTICS:   EKG: appears normal, NSR, sinus bradycardia, normal axis, normal intervals, no acute ST/T changes c/w ischemia, no LVH by voltage criteria, unchanged from previous tracings    Recent Labs   Lab Test 08/02/19  1539 06/17/19  0751 06/26/15  1403   HGB 12.9 14.8 15.1    279 232   NA  --  140 139   POTASSIUM  --  3.9 4.5   CR  --  0.77 0.78        IMPRESSION:   Reason for surgery/procedure: poor range of motion after right knee TKA, exam under anesthesia for manipulation    The proposed surgical procedure is considered LOW risk.    REVISED CARDIAC RISK INDEX  The patient has the following serious cardiovascular risks for perioperative complications such as (MI, PE, VFib and 3  AV Block):  No serious cardiac risks  INTERPRETATION: 0 risks: Class I (very low risk - 0.4% complication rate)    The patient has the following additional risks for perioperative complications:  No identified additional risks      ICD-10-CM    1. Preop general physical exam Z01.818    2. Decreased range of motion (ROM) of right knee M25.661        RECOMMENDATIONS:         --Patient is on no chronic medications    APPROVAL GIVEN to proceed with proposed procedure, without further diagnostic evaluation       Signed Electronically by: Amy Bernard MD    Copy of this evaluation report is provided to requesting physician.    [facility] Preop Guidelines    Revised Cardiac Risk Index

## 2019-10-04 ENCOUNTER — HEALTH MAINTENANCE LETTER (OUTPATIENT)
Age: 64
End: 2019-10-04

## 2019-12-24 ENCOUNTER — TRANSFERRED RECORDS (OUTPATIENT)
Dept: HEALTH INFORMATION MANAGEMENT | Facility: CLINIC | Age: 64
End: 2019-12-24

## 2019-12-30 ENCOUNTER — TRANSFERRED RECORDS (OUTPATIENT)
Dept: HEALTH INFORMATION MANAGEMENT | Facility: CLINIC | Age: 64
End: 2019-12-30

## 2020-01-15 ENCOUNTER — TELEPHONE (OUTPATIENT)
Dept: FAMILY MEDICINE | Facility: CLINIC | Age: 65
End: 2020-01-15

## 2020-01-15 NOTE — TELEPHONE ENCOUNTER
Patient reports, if she pushes stool even just a little,  will have bright red blood in the toilet.  Stool is brownish color.  Has 3 episodes of stool per day.  Will have discomfort in anal area if goes frequently.  Last Colonoscopy 6/26/14, had a fissure at that time.  Area is tender, this can be positional as well.     Can not say if it has had reoccurrence.  Symptoms x 2 weeks.  Advise sooner evaluation.  An appointment is scheduled for tomorrow.  Patient/parent verbalized understanding of instructions provided and agreed with the plan of care  Zita Prince RN

## 2020-01-15 NOTE — TELEPHONE ENCOUNTER
Is she ok to wait for this appointment?      ----- Message -----   From: Bryanna Estevez   Sent: 1/15/2020  11:24 AM CST   To: An Reception   Subject: Appointment scheduled from [facility]                 Appointment For: Bryanna Estevez ([id number])   Visit Type: [facility] OFFICE VISIT SHORT (910)      2/12/2020    4:40 PM  20 mins.  Meghan Bustillos MD        [facility]      Patient Comments:   Bleeding from bowel movements     Reply sent to pt stating    Please call me so I can gather more information about your symptoms to make sure you are ok to wait to be seen tomorrow.  If your symptoms worsen go to the ER right away.   Mady HERMAN, RN  [facility] Triage    [phone]

## 2020-01-16 ENCOUNTER — OFFICE VISIT (OUTPATIENT)
Dept: FAMILY MEDICINE | Facility: CLINIC | Age: 65
End: 2020-01-16
Payer: COMMERCIAL

## 2020-01-16 VITALS
HEART RATE: 67 BPM | HEIGHT: 69 IN | SYSTOLIC BLOOD PRESSURE: 129 MMHG | BODY MASS INDEX: 26.36 KG/M2 | WEIGHT: 178 LBS | TEMPERATURE: 98.3 F | DIASTOLIC BLOOD PRESSURE: 72 MMHG

## 2020-01-16 DIAGNOSIS — K62.5 RECTAL BLEEDING: Primary | ICD-10-CM

## 2020-01-16 PROCEDURE — 99213 OFFICE O/P EST LOW 20 MIN: CPT | Performed by: FAMILY MEDICINE

## 2020-01-16 RX ORDER — PILOCARPINE HYDROCHLORIDE 5 MG/1
TABLET, FILM COATED ORAL
COMMUNITY
Start: 2020-01-14 | End: 2021-02-18

## 2020-01-16 RX ORDER — DEXAMETHASONE 0.5 MG/5ML
SOLUTION ORAL
COMMUNITY
Start: 2020-01-07 | End: 2021-02-18

## 2020-01-16 ASSESSMENT — MIFFLIN-ST. JEOR: SCORE: 1421.78

## 2020-01-16 NOTE — PROGRESS NOTES
"Subjective     Bryanna Estevez is a 64 year old female who presents to clinic today for the following health issues:    HPI   Rectal bleeding with a bowel movement  Since Dec 30    Started belbabu    Pain with pain and bleeding with BMs  For over 2 week  Notes blood clots in the toilet after stooling  Stool is lumpy so a bit on the hard side  Not particularly hard stools  Has had surgery for rectal fissure in the past.                   Reviewed and updated as needed this visit by Provider         Review of Systems   ROS COMP: Constitutional, HEENT, cardiovascular, pulmonary, gi and gu systems are negative, except as otherwise noted.      Objective    /72   Pulse 67   Temp 98.3  F (36.8  C) (Oral)   Ht 1.753 m (5' 9\")   Wt 80.7 kg (178 lb)   BMI 26.29 kg/m    Body mass index is 26.29 kg/m .  Physical Exam   GENERAL: healthy, alert and no distress  RECTAL (female): normal sphincter tone, no obvious fissure or inflamed hemmorhoid as cause of blood in stools.     Diagnostic Test Results:  Labs reviewed in Epic        Assessment & Plan     1. Rectal bleeding  Refer to colorectal as has had surgery in the past. Probable needs colonsocopy  - COLORECTAL SURGERY REFERRAL     BMI:   Estimated body mass index is 26.29 kg/m  as calculated from the following:    Height as of this encounter: 1.753 m (5' 9\").    Weight as of this encounter: 80.7 kg (178 lb).   Weight management plan: Discussed healthy diet and exercise guidelines            No follow-ups on file.    Meghan Nieves MD  [facility]    "

## 2020-01-21 ENCOUNTER — TELEPHONE (OUTPATIENT)
Dept: FAMILY MEDICINE | Facility: CLINIC | Age: 65
End: 2020-01-21

## 2020-01-21 NOTE — TELEPHONE ENCOUNTER
Called patient and gave providers message/ instructions.  Patient states (s)he understands this.     Melissa ELENAN, RN

## 2020-01-21 NOTE — TELEPHONE ENCOUNTER
If the bleeding started with taking the medication and now has stopped after stopping, okay to hold on colonoscopy. IF bleeding returns however, would recommend colonoscopy.    Meghan Nieves MD

## 2020-01-21 NOTE — TELEPHONE ENCOUNTER
Reason for Call:  Other call back    Detailed comments: patient is calling stating has stopped bleeding since last appt, wondering if colonoscopy is still needed. Please call to discuss. Thank you.    Phone Number Patient can be reached at: Home number on file [phone] (home)    Best Time:     Can we leave a detailed message on this number? YES    Call taken on 1/21/2020 at 9:28 AM by Elena Mendoza

## 2020-01-30 ENCOUNTER — TELEPHONE (OUTPATIENT)
Dept: FAMILY MEDICINE | Facility: CLINIC | Age: 65
End: 2020-01-30

## 2020-01-30 DIAGNOSIS — R42 DIZZINESS: Primary | ICD-10-CM

## 2020-01-30 DIAGNOSIS — T50.901A ACCIDENTAL OVERDOSE: ICD-10-CM

## 2020-01-30 NOTE — TELEPHONE ENCOUNTER
Patient wants to talk to Dr Bustillos about the wrong medication, Belbuca, that she was on. Please call as soon as possible.

## 2020-01-30 NOTE — TELEPHONE ENCOUNTER
If I recall she was given sublingual dosing. Half life of this is 37 hours.  It should be well out of her system by now.   Only labs needing to be checked are liver enzymes. I am not convinced what she is experiencing is side effects from this medication.   Have her make an appoinment. Can check her liver enzymes prior to this with lab appt however.     Meghan Nieves MD

## 2020-01-30 NOTE — TELEPHONE ENCOUNTER
Returned call to patient.     Patient says she was prescribed Belbuca from an outside provider and the pharmacy messed up the dose.  The provider had ordered 75 mcg daily and the pharmacy gave her 750 mg daily.  She took 3 of this dose and started getting side effects.     She saw Dr Bustillos 1/16/20 and told her about being overdosed the medication and the side effects she was having including blood in her stool.   She continues to have some dizziness and her gait is off.  Patient is very concerned that the high dose of medication could've damaged her organs.     Patient is now requesting Dr Bustillos to order labs to check on possible organ damage.   She is requesting CBC, etc along with a current drug level of Belbuca to see how much is still in her system.    Patient informed Dr Bustillos back 2/4/2020.    Melissa Layton BSN, RN

## 2020-02-04 ENCOUNTER — TELEPHONE (OUTPATIENT)
Dept: FAMILY MEDICINE | Facility: CLINIC | Age: 65
End: 2020-02-04

## 2020-02-04 ENCOUNTER — DOCUMENTATION ONLY (OUTPATIENT)
Dept: FAMILY MEDICINE | Facility: CLINIC | Age: 65
End: 2020-02-04

## 2020-02-04 ENCOUNTER — DOCUMENTATION ONLY (OUTPATIENT)
Dept: LAB | Facility: CLINIC | Age: 65
End: 2020-02-04

## 2020-02-04 DIAGNOSIS — R42 DIZZINESS: ICD-10-CM

## 2020-02-04 DIAGNOSIS — T50.901A ACCIDENTAL OVERDOSE: ICD-10-CM

## 2020-02-04 DIAGNOSIS — R42 DIZZINESS: Primary | ICD-10-CM

## 2020-02-04 LAB
ALBUMIN SERPL-MCNC: 3.4 G/DL (ref 3.4–5)
ALP SERPL-CCNC: 105 U/L (ref 40–150)
ALT SERPL W P-5'-P-CCNC: 46 U/L (ref 0–50)
AST SERPL W P-5'-P-CCNC: 32 U/L (ref 0–45)
BILIRUB DIRECT SERPL-MCNC: <0.1 MG/DL (ref 0–0.2)
BILIRUB SERPL-MCNC: 0.2 MG/DL (ref 0.2–1.3)
ERYTHROCYTE [DISTWIDTH] IN BLOOD BY AUTOMATED COUNT: 13.4 % (ref 10–15)
HCT VFR BLD AUTO: 42.6 % (ref 35–47)
HGB BLD-MCNC: 14.3 G/DL (ref 11.7–15.7)
MCH RBC QN AUTO: 31 PG (ref 26.5–33)
MCHC RBC AUTO-ENTMCNC: 33.6 G/DL (ref 31.5–36.5)
MCV RBC AUTO: 92 FL (ref 78–100)
PLATELET # BLD AUTO: 302 10E9/L (ref 150–450)
PROT SERPL-MCNC: 6.9 G/DL (ref 6.8–8.8)
RBC # BLD AUTO: 4.61 10E12/L (ref 3.8–5.2)
WBC # BLD AUTO: 8.3 10E9/L (ref 4–11)

## 2020-02-04 PROCEDURE — 80299 QUANTITATIVE ASSAY DRUG: CPT | Mod: 90 | Performed by: FAMILY MEDICINE

## 2020-02-04 PROCEDURE — 36415 COLL VENOUS BLD VENIPUNCTURE: CPT | Performed by: FAMILY MEDICINE

## 2020-02-04 PROCEDURE — 99000 SPECIMEN HANDLING OFFICE-LAB: CPT | Performed by: FAMILY MEDICINE

## 2020-02-04 PROCEDURE — 85027 COMPLETE CBC AUTOMATED: CPT | Performed by: FAMILY MEDICINE

## 2020-02-04 PROCEDURE — 80076 HEPATIC FUNCTION PANEL: CPT | Performed by: FAMILY MEDICINE

## 2020-02-04 NOTE — PROGRESS NOTES
Patient had a lab only appointment on 2.4.2020 and is requesting a CBC along with her liver panel and drug screen that was done today. Lab chema the appropriate tube for this. Please review and place future order if needed. Lab will watch for order.     Thank you,   Yeimi Frankel MLT ([facility])

## 2020-02-04 NOTE — TELEPHONE ENCOUNTER
Order for drug level for belbuca put in Epic after assistance with our lab.   Per verbal order, ok to order from Dr Bustillos.     Melissa Layton BSN, RN

## 2020-02-04 NOTE — TELEPHONE ENCOUNTER
Called patient and gave providers message/ instructions.  Patient states (s)he understands this.     She insists on getting a drug level done because she says she is still having symptoms that she didn't have before the medication error.  She also has a new breast lump.  Recommended that we make lab appointment for today and an appointment with Dr Bustillos later this week to review lab results and have Dr Bustillos check her breast lump.  Patient agrees.     Made lab appointment for today at 3:00 pm.  Sending this encounter back to Dr Bustillos to order drug level.  Made follow up appointment with Dr Bustillos for 2/6/2020 at 8:20 am.     Melissa Layton BSN, RN

## 2020-02-04 NOTE — TELEPHONE ENCOUNTER
Reason for Call:  Other call back    Detailed comments: Found a lump in breast, about pea size. Would like a call back from PCP.     Phone Number Patient can be reached at: Home number on file [phone] (home)    Best Time:     Can we leave a detailed message on this number? YES    Call taken on 2/4/2020 at 10:09 AM by Olive Boateng

## 2020-02-06 ENCOUNTER — OFFICE VISIT (OUTPATIENT)
Dept: FAMILY MEDICINE | Facility: CLINIC | Age: 65
End: 2020-02-06
Payer: COMMERCIAL

## 2020-02-06 VITALS
BODY MASS INDEX: 26.96 KG/M2 | HEIGHT: 69 IN | TEMPERATURE: 97.6 F | HEART RATE: 68 BPM | DIASTOLIC BLOOD PRESSURE: 80 MMHG | SYSTOLIC BLOOD PRESSURE: 130 MMHG | WEIGHT: 182 LBS

## 2020-02-06 DIAGNOSIS — T50.901D ACCIDENTAL DRUG OVERDOSE, SUBSEQUENT ENCOUNTER: ICD-10-CM

## 2020-02-06 DIAGNOSIS — M25.561 CHRONIC PAIN OF RIGHT KNEE: ICD-10-CM

## 2020-02-06 DIAGNOSIS — G89.29 CHRONIC PAIN OF RIGHT KNEE: ICD-10-CM

## 2020-02-06 DIAGNOSIS — N63.20 LEFT BREAST LUMP: Primary | ICD-10-CM

## 2020-02-06 LAB
ANION GAP SERPL CALCULATED.3IONS-SCNC: 6 MMOL/L (ref 3–14)
BUN SERPL-MCNC: 25 MG/DL (ref 7–30)
CALCIUM SERPL-MCNC: 9.5 MG/DL (ref 8.5–10.1)
CHLORIDE SERPL-SCNC: 107 MMOL/L (ref 94–109)
CO2 SERPL-SCNC: 26 MMOL/L (ref 20–32)
CREAT SERPL-MCNC: 0.73 MG/DL (ref 0.52–1.04)
GFR SERPL CREATININE-BSD FRML MDRD: 87 ML/MIN/{1.73_M2}
GLUCOSE SERPL-MCNC: 92 MG/DL (ref 70–99)
POTASSIUM SERPL-SCNC: 3.8 MMOL/L (ref 3.4–5.3)
SODIUM SERPL-SCNC: 139 MMOL/L (ref 133–144)

## 2020-02-06 PROCEDURE — 80048 BASIC METABOLIC PNL TOTAL CA: CPT | Performed by: FAMILY MEDICINE

## 2020-02-06 PROCEDURE — 36415 COLL VENOUS BLD VENIPUNCTURE: CPT | Performed by: FAMILY MEDICINE

## 2020-02-06 PROCEDURE — 99214 OFFICE O/P EST MOD 30 MIN: CPT | Performed by: FAMILY MEDICINE

## 2020-02-06 ASSESSMENT — MIFFLIN-ST. JEOR: SCORE: 1439.93

## 2020-02-06 NOTE — PROGRESS NOTES
"Subjective     Bryanna Estevez is a 64 year old female who presents to clinic today for the following health issues:    HPI         Pt here to follow-up overdose of buprenophrine.  She would like her drug level tested. This was drawn 2 days ago  Based on half life, discussed results will probably be negative. She is aware of this    She notes that when she walks she will veer off to the left. She can catch herself and correct it.  She is not sure if it started before she took the drug or after.  ? If she is now looking for anything abnormal  PT does have pain in her right knee which she has been battling since surgery in June of 2019. Sounds like some nerve damage  This could be contributing to her symptom of feeling off balance or veering to the left  Has been to pain clinic and has not tolerated gabapentin or lyrica. Now taking ibuprofen 800 mg every 6 hours  Discussed she is overdosing and will check kidney function. She should return back to pain clinic for further management.     Pt also noticed lump in left breast on 1/28/20  No family h/o breast cancer and it does not bother her.   She is due for mammogram    Review lab work.        Reviewed and updated as needed this visit by Provider         Review of Systems   ROS COMP: Constitutional, HEENT, cardiovascular, pulmonary, gi and gu systems are negative, except as otherwise noted.      Objective    /80   Pulse 68   Temp 97.6  F (36.4  C) (Oral)   Ht 1.753 m (5' 9\")   Wt 82.6 kg (182 lb)   BMI 26.88 kg/m    Body mass index is 26.88 kg/m .  Physical Exam   GENERAL: healthy, alert and no distress  BREAST: mass left breast 6:00, few mm sized firm mass noted on palpation    Diagnostic Test Results:  Labs reviewed in Epic        Assessment & Plan     1. Left breast lump  Needs diag mammogram  - MA Diagnostic Bilateral w/Jose; Future    2. Chronic pain of right knee  To follow-up with pain clinic for further managment  - Basic metabolic panel    3. " Accidental drug overdose, subsequent encounter  Resolved. Await levels             No follow-ups on file.    Meghan Nieves MD  [facility]

## 2020-02-07 ENCOUNTER — ANCILLARY PROCEDURE (OUTPATIENT)
Dept: MAMMOGRAPHY | Facility: CLINIC | Age: 65
End: 2020-02-07
Attending: FAMILY MEDICINE
Payer: COMMERCIAL

## 2020-02-07 ENCOUNTER — ANCILLARY PROCEDURE (OUTPATIENT)
Dept: ULTRASOUND IMAGING | Facility: CLINIC | Age: 65
End: 2020-02-07
Attending: FAMILY MEDICINE
Payer: COMMERCIAL

## 2020-02-07 DIAGNOSIS — N63.20 LEFT BREAST LUMP: ICD-10-CM

## 2020-02-07 LAB
BUPRENORPHINE GLUCURONIDE URINE: <5 NG/ML
BUPRENORPHINE UR CFM-MCNC: <2 NG/ML
NALOXONE URINE: <100 NG/ML
NORBUPRENORPHINE GLUCURONIDE URINE: <5 NG/ML
NORBUPRENORPHINE UR CFM-MCNC: <2 NG/ML

## 2020-02-07 PROCEDURE — 76642 ULTRASOUND BREAST LIMITED: CPT | Mod: LT | Performed by: STUDENT IN AN ORGANIZED HEALTH CARE EDUCATION/TRAINING PROGRAM

## 2020-02-07 PROCEDURE — 77066 DX MAMMO INCL CAD BI: CPT | Performed by: STUDENT IN AN ORGANIZED HEALTH CARE EDUCATION/TRAINING PROGRAM

## 2020-02-07 PROCEDURE — G0279 TOMOSYNTHESIS, MAMMO: HCPCS | Performed by: STUDENT IN AN ORGANIZED HEALTH CARE EDUCATION/TRAINING PROGRAM

## 2020-02-11 ENCOUNTER — TELEPHONE (OUTPATIENT)
Dept: FAMILY MEDICINE | Facility: CLINIC | Age: 65
End: 2020-02-11

## 2020-02-11 NOTE — TELEPHONE ENCOUNTER
Returned call to patient.   She is requesting a copy of her lab results be mailed to her.   This RN did this.     Melissa Layton BSN, RN

## 2020-07-01 ENCOUNTER — TRANSFERRED RECORDS (OUTPATIENT)
Dept: HEALTH INFORMATION MANAGEMENT | Facility: CLINIC | Age: 65
End: 2020-07-01

## 2020-07-03 DIAGNOSIS — Z47.89 AFTERCARE FOLLOWING SURGERY OF THE MUSCULOSKELETAL SYSTEM: Primary | ICD-10-CM

## 2020-07-03 LAB
BASOPHILS # BLD AUTO: 0 10E9/L (ref 0–0.2)
BASOPHILS NFR BLD AUTO: 0.4 %
CRP SERPL-MCNC: 3 MG/L (ref 0–8)
DIFFERENTIAL METHOD BLD: NORMAL
EOSINOPHIL # BLD AUTO: 0.1 10E9/L (ref 0–0.7)
EOSINOPHIL NFR BLD AUTO: 1.1 %
ERYTHROCYTE [DISTWIDTH] IN BLOOD BY AUTOMATED COUNT: 12.2 % (ref 10–15)
ERYTHROCYTE [SEDIMENTATION RATE] IN BLOOD BY WESTERGREN METHOD: 11 MM/H (ref 0–30)
HCT VFR BLD AUTO: 41.8 % (ref 35–47)
HGB BLD-MCNC: 13.9 G/DL (ref 11.7–15.7)
LYMPHOCYTES # BLD AUTO: 1.6 10E9/L (ref 0.8–5.3)
LYMPHOCYTES NFR BLD AUTO: 29.9 %
MCH RBC QN AUTO: 30.5 PG (ref 26.5–33)
MCHC RBC AUTO-ENTMCNC: 33.3 G/DL (ref 31.5–36.5)
MCV RBC AUTO: 92 FL (ref 78–100)
MONOCYTES # BLD AUTO: 0.4 10E9/L (ref 0–1.3)
MONOCYTES NFR BLD AUTO: 7.5 %
NEUTROPHILS # BLD AUTO: 3.3 10E9/L (ref 1.6–8.3)
NEUTROPHILS NFR BLD AUTO: 61.1 %
PLATELET # BLD AUTO: 262 10E9/L (ref 150–450)
RBC # BLD AUTO: 4.56 10E12/L (ref 3.8–5.2)
WBC # BLD AUTO: 5.5 10E9/L (ref 4–11)

## 2020-07-03 PROCEDURE — 86431 RHEUMATOID FACTOR QUANT: CPT | Performed by: ORTHOPAEDIC SURGERY

## 2020-07-03 PROCEDURE — 86140 C-REACTIVE PROTEIN: CPT | Performed by: ORTHOPAEDIC SURGERY

## 2020-07-03 PROCEDURE — 85025 COMPLETE CBC W/AUTO DIFF WBC: CPT | Performed by: ORTHOPAEDIC SURGERY

## 2020-07-03 PROCEDURE — 36415 COLL VENOUS BLD VENIPUNCTURE: CPT | Performed by: ORTHOPAEDIC SURGERY

## 2020-07-03 PROCEDURE — 85652 RBC SED RATE AUTOMATED: CPT | Performed by: ORTHOPAEDIC SURGERY

## 2020-07-03 PROCEDURE — 86038 ANTINUCLEAR ANTIBODIES: CPT | Performed by: ORTHOPAEDIC SURGERY

## 2020-07-06 LAB — ANA SER QL IF: NEGATIVE

## 2020-07-07 LAB — RHEUMATOID FACT SER NEPH-ACNC: <7 IU/ML (ref 0–20)

## 2020-07-29 ENCOUNTER — TRANSFERRED RECORDS (OUTPATIENT)
Dept: HEALTH INFORMATION MANAGEMENT | Facility: CLINIC | Age: 65
End: 2020-07-29

## 2020-09-18 ENCOUNTER — TRANSFERRED RECORDS (OUTPATIENT)
Dept: HEALTH INFORMATION MANAGEMENT | Facility: CLINIC | Age: 65
End: 2020-09-18

## 2020-11-14 ENCOUNTER — HEALTH MAINTENANCE LETTER (OUTPATIENT)
Age: 65
End: 2020-11-14

## 2021-01-30 ENCOUNTER — TRANSFERRED RECORDS (OUTPATIENT)
Dept: HEALTH INFORMATION MANAGEMENT | Facility: CLINIC | Age: 66
End: 2021-01-30

## 2021-02-16 NOTE — PROGRESS NOTES
"    Assessment & Plan     Encounter for Medicare annual wellness exam  completed    Palpitations  Normal EKG other than bradycardia but not symptomatic from this. Labs normal  Next step is zio patch if pt would like to pursue, she declined today  - EKG 12-lead complete w/read - Clinics  - Comprehensive metabolic panel  - TSH with free T4 reflex  - CBC with platelets    Hiatal hernia  Trial of PPI  - omeprazole (PRILOSEC) 20 MG DR capsule; Take 1 capsule (20 mg) by mouth daily    Dysphagia, unspecified type  ? Due to gerd from hernia, trial of PPI, if not improved, may need swallow study vs EGD    Screening cholesterol level    - Lipid panel reflex to direct LDL Fasting             BMI:   Estimated body mass index is 28.8 kg/m  as calculated from the following:    Height as of this encounter: 1.753 m (5' 9\").    Weight as of this encounter: 88.5 kg (195 lb).           Return in about 53 weeks (around 2/24/2022) for Annual Wellness Visit.    Meghan Painter MD  [facility]    Amelie Gould is a 65 year old who presents for the following health issues     HPI       Follow up to heart scan. Secondary finding of hiatal hernia and arthrosclerotic disease    Pt did heart scan due to symptoms of neck muscle spasming and she notes her heart races  she notes palpitations 1-2 times per weeks lasting a minute or so.   Not associated with activity, can occur with climbing up stairs and at rest.     Also notes constant pain in chest.   Not on ppi at this time. Has been on ppi in the past  Hiatal hernia noted on cardiac scan, medium sized    Pt with complaint of food getting stuck at least daily  She can choke on water at time, and with food.   Feels like advil gets stuck in her throat  Sometimes food will come back up  Not associated with her chest pain    Review of Systems   Constitutional, HEENT, cardiovascular, pulmonary, gi and gu systems are negative, except as otherwise noted.      Objective " "   /75   Pulse 66   Temp 97.7  F (36.5  C) (Oral)   Ht 1.753 m (5' 9\")   Wt 88.5 kg (195 lb)   BMI 28.80 kg/m    Body mass index is 28.8 kg/m .  Physical Exam   GENERAL: healthy, alert and no distress  EYES: Eyes grossly normal to inspection, PERRL and conjunctivae and sclerae normal  HENT: ear canals and TM's normal, nose and mouth without ulcers or lesions  NECK: no adenopathy, no asymmetry, masses, or scars and thyroid normal to palpation  RESP: lungs clear to auscultation - no rales, rhonchi or wheezes  CV: regular rate and rhythm, normal S1 S2, no S3 or S4, no murmur, click or rub, no peripheral edema and peripheral pulses strong  ABDOMEN: soft, nontender, no hepatosplenomegaly, no masses and bowel sounds normal  MS: no gross musculoskeletal defects noted, no edema  SKIN: no suspicious lesions or rashes  NEURO: Normal strength and tone, mentation intact and speech normal  PSYCH: mentation appears normal, affect normal/bright    EKG - Reviewed and interpreted by me sinus bradycardia, normal axis, normal intervals, no acute ST/T changes c/w ischemia, no LVH by voltage criteria, unchanged from previous tracings  Results for orders placed or performed in visit on 02/18/21 (from the past 24 hour(s))   Comprehensive metabolic panel   Result Value Ref Range    Sodium 141 133 - 144 mmol/L    Potassium 4.0 3.4 - 5.3 mmol/L    Chloride 108 94 - 109 mmol/L    Carbon Dioxide 31 20 - 32 mmol/L    Anion Gap 2 (L) 3 - 14 mmol/L    Glucose 94 70 - 99 mg/dL    Urea Nitrogen 14 7 - 30 mg/dL    Creatinine 0.79 0.52 - 1.04 mg/dL    GFR Estimate 78 >60 mL/min/[1.73_m2]    GFR Estimate If Black >90 >60 mL/min/[1.73_m2]    Calcium 9.0 8.5 - 10.1 mg/dL    Bilirubin Total 0.5 0.2 - 1.3 mg/dL    Albumin 3.5 3.4 - 5.0 g/dL    Protein Total 6.9 6.8 - 8.8 g/dL    Alkaline Phosphatase 78 40 - 150 U/L    ALT 36 0 - 50 U/L    AST 26 0 - 45 U/L   TSH with free T4 reflex   Result Value Ref Range    TSH 2.20 0.40 - 4.00 mU/L   CBC " with platelets   Result Value Ref Range    WBC 6.9 4.0 - 11.0 10e9/L    RBC Count 4.65 3.8 - 5.2 10e12/L    Hemoglobin 14.2 11.7 - 15.7 g/dL    Hematocrit 43.1 35.0 - 47.0 %    MCV 93 78 - 100 fl    MCH 30.5 26.5 - 33.0 pg    MCHC 32.9 31.5 - 36.5 g/dL    RDW 12.4 10.0 - 15.0 %    Platelet Count 261 150 - 450 10e9/L   Lipid panel reflex to direct LDL Fasting   Result Value Ref Range    Cholesterol 220 (H) <200 mg/dL    Triglycerides 104 <150 mg/dL    HDL Cholesterol 62 >49 mg/dL    LDL Cholesterol Calculated 137 (H) <100 mg/dL    Non HDL Cholesterol 158 (H) <130 mg/dL

## 2021-02-18 ENCOUNTER — OFFICE VISIT (OUTPATIENT)
Dept: FAMILY MEDICINE | Facility: CLINIC | Age: 66
End: 2021-02-18
Payer: COMMERCIAL

## 2021-02-18 VITALS
DIASTOLIC BLOOD PRESSURE: 75 MMHG | WEIGHT: 195 LBS | HEART RATE: 66 BPM | SYSTOLIC BLOOD PRESSURE: 125 MMHG | TEMPERATURE: 97.7 F | BODY MASS INDEX: 28.88 KG/M2 | HEIGHT: 69 IN

## 2021-02-18 DIAGNOSIS — R00.2 PALPITATIONS: ICD-10-CM

## 2021-02-18 DIAGNOSIS — K44.9 HIATAL HERNIA: ICD-10-CM

## 2021-02-18 DIAGNOSIS — Z13.220 SCREENING CHOLESTEROL LEVEL: ICD-10-CM

## 2021-02-18 DIAGNOSIS — Z00.00 ENCOUNTER FOR MEDICARE ANNUAL WELLNESS EXAM: Primary | ICD-10-CM

## 2021-02-18 DIAGNOSIS — R13.10 DYSPHAGIA, UNSPECIFIED TYPE: ICD-10-CM

## 2021-02-18 LAB
ALBUMIN SERPL-MCNC: 3.5 G/DL (ref 3.4–5)
ALP SERPL-CCNC: 78 U/L (ref 40–150)
ALT SERPL W P-5'-P-CCNC: 36 U/L (ref 0–50)
ANION GAP SERPL CALCULATED.3IONS-SCNC: 2 MMOL/L (ref 3–14)
AST SERPL W P-5'-P-CCNC: 26 U/L (ref 0–45)
BILIRUB SERPL-MCNC: 0.5 MG/DL (ref 0.2–1.3)
BUN SERPL-MCNC: 14 MG/DL (ref 7–30)
CALCIUM SERPL-MCNC: 9 MG/DL (ref 8.5–10.1)
CHLORIDE SERPL-SCNC: 108 MMOL/L (ref 94–109)
CHOLEST SERPL-MCNC: 220 MG/DL
CO2 SERPL-SCNC: 31 MMOL/L (ref 20–32)
CREAT SERPL-MCNC: 0.79 MG/DL (ref 0.52–1.04)
ERYTHROCYTE [DISTWIDTH] IN BLOOD BY AUTOMATED COUNT: 12.4 % (ref 10–15)
GFR SERPL CREATININE-BSD FRML MDRD: 78 ML/MIN/{1.73_M2}
GLUCOSE SERPL-MCNC: 94 MG/DL (ref 70–99)
HCT VFR BLD AUTO: 43.1 % (ref 35–47)
HDLC SERPL-MCNC: 62 MG/DL
HGB BLD-MCNC: 14.2 G/DL (ref 11.7–15.7)
LDLC SERPL CALC-MCNC: 137 MG/DL
MCH RBC QN AUTO: 30.5 PG (ref 26.5–33)
MCHC RBC AUTO-ENTMCNC: 32.9 G/DL (ref 31.5–36.5)
MCV RBC AUTO: 93 FL (ref 78–100)
NONHDLC SERPL-MCNC: 158 MG/DL
PLATELET # BLD AUTO: 261 10E9/L (ref 150–450)
POTASSIUM SERPL-SCNC: 4 MMOL/L (ref 3.4–5.3)
PROT SERPL-MCNC: 6.9 G/DL (ref 6.8–8.8)
RBC # BLD AUTO: 4.65 10E12/L (ref 3.8–5.2)
SODIUM SERPL-SCNC: 141 MMOL/L (ref 133–144)
TRIGL SERPL-MCNC: 104 MG/DL
TSH SERPL DL<=0.005 MIU/L-ACNC: 2.2 MU/L (ref 0.4–4)
WBC # BLD AUTO: 6.9 10E9/L (ref 4–11)

## 2021-02-18 PROCEDURE — 99214 OFFICE O/P EST MOD 30 MIN: CPT | Mod: 25 | Performed by: FAMILY MEDICINE

## 2021-02-18 PROCEDURE — 80053 COMPREHEN METABOLIC PANEL: CPT | Performed by: FAMILY MEDICINE

## 2021-02-18 PROCEDURE — 99397 PER PM REEVAL EST PAT 65+ YR: CPT | Performed by: FAMILY MEDICINE

## 2021-02-18 PROCEDURE — 93000 ELECTROCARDIOGRAM COMPLETE: CPT | Performed by: FAMILY MEDICINE

## 2021-02-18 PROCEDURE — 36415 COLL VENOUS BLD VENIPUNCTURE: CPT | Performed by: FAMILY MEDICINE

## 2021-02-18 PROCEDURE — 85027 COMPLETE CBC AUTOMATED: CPT | Performed by: FAMILY MEDICINE

## 2021-02-18 PROCEDURE — 80061 LIPID PANEL: CPT | Performed by: FAMILY MEDICINE

## 2021-02-18 PROCEDURE — 84443 ASSAY THYROID STIM HORMONE: CPT | Performed by: FAMILY MEDICINE

## 2021-02-18 ASSESSMENT — MIFFLIN-ST. JEOR: SCORE: 1493.89

## 2021-02-18 NOTE — PATIENT INSTRUCTIONS
Patient Education   Personalized Prevention Plan  You are due for the preventive services outlined below.  Your care team is available to assist you in scheduling these services.  If you have already completed any of these items, please share that information with your care team to update in your medical record.  Health Maintenance Due   Topic Date Due     Osteoporosis Screening  1955     Zoster (Shingles) Vaccine (1 of 2) 02/21/2005     Annual Wellness Visit  02/21/2020     FALL RISK ASSESSMENT  02/21/2020     Pneumococcal Vaccine (1 of 1 - PPSV23) 02/21/2020     Flu Vaccine (1) 09/01/2020        Patient Education   Personalized Prevention Plan  You are due for the preventive services outlined below.  Your care team is available to assist you in scheduling these services.  If you have already completed any of these items, please share that information with your care team to update in your medical record.  Health Maintenance Due   Topic Date Due     Osteoporosis Screening  1955     Zoster (Shingles) Vaccine (1 of 2) 02/21/2005     FALL RISK ASSESSMENT  02/21/2020     Pneumococcal Vaccine (1 of 1 - PPSV23) 02/21/2020     Flu Vaccine (1) 09/01/2020

## 2021-02-18 NOTE — PROGRESS NOTES
"  SUBJECTIVE:   Bryanna Estevez is a 65 year old female who presents for Preventive Visit.      Patient has been advised of split billing requirements and indicates understanding: Yes  Are you in the first 12 months of your Medicare Part B coverage?  No    Physical Health:    In general, how would you rate your overall physical health? good    Outside of work, how many days during the week do you exercise? none    Outside of work, approximately how many minutes a day do you exercise?not applicable    If you drink alcohol do you typically have >3 drinks per day or >7 drinks per week? No    Do you usually eat at least 4 servings of fruit and vegetables a day, include whole grains & fiber and avoid regularly eating high fat or \"junk\" foods? NO    Do you have any problems taking medications regularly?  YES    Do you have any side effects from medications? none    Needs assistance for the following daily activities: no assistance needed    Which of the following safety concerns are present in your home?  none identified     Hearing impairment: No    In the past 6 months, have you been bothered by leaking of urine? yes    Mental Health:    In general, how would you rate your overall mental or emotional health? good  PHQ-2 Score: 0    Do you feel safe in your environment? Yes    Have you ever done Advance Care Planning? (For example, a Health Directive, POLST, or a discussion with a medical provider or your loved ones about your wishes): No, advance care planning information given to patient to review.  Patient declined advance care planning discussion at this time.    Additional concerns to address?  No    Fall risk:  Fallen 2 or more times in the past year?: No  Any fall with injury in the past year?: No  click delete button to remove this line now  Cognitive Screenin) Repeat 3 items (Leader, Season, Table)      2) Clock draw:   NORMAL  3) 3 item recall:   Recalls 3 objects  Results: 3 items recalled: COGNITIVE " IMPAIRMENT LESS LIKELY    Mini-CogTM Copyright KIMBERLY Orourke. Licensed by the author for use in [facility]; reprinted with permission (cesar@.[email or web address]). All rights reserved.      Do you have sleep apnea, excessive snoring or daytime drowsiness?: no            Reviewed and updated as needed this visit by clinical staff  Tobacco  Allergies  Meds   Med Hx  Surg Hx  Fam Hx  Soc Hx        Reviewed and updated as needed this visit by Provider                Social History     Tobacco Use     Smoking status: Never Smoker     Smokeless tobacco: Never Used   Substance Use Topics     Alcohol use: No     Comment: rare                           Current providers sharing in care for this patient include:   Patient Care Team:  Meghan Painter MD as PCP - General (Family Practice)  Meghan Painter MD as Assigned PCP  Pavel Scott MD as Assigned Surgical Provider    The following health maintenance items are reviewed in Epic and correct as of today:  Health Maintenance   Topic Date Due     DEXA  1955     ZOSTER IMMUNIZATION (1 of 2) 02/21/2005     MEDICARE ANNUAL WELLNESS VISIT  02/21/2020     FALL RISK ASSESSMENT  02/21/2020     Pneumococcal Vaccine: 65+ Years (1 of 1 - PPSV23) 02/21/2020     INFLUENZA VACCINE (1) 09/01/2020     MAMMO SCREENING  02/07/2022     DTAP/TDAP/TD IMMUNIZATION (3 - Td) 10/03/2022     ADVANCE CARE PLANNING  03/11/2024     COLORECTAL CANCER SCREENING  06/26/2024     LIPID  02/18/2026     HEPATITIS C SCREENING  Completed     HIV SCREENING  Completed     PHQ-2  Completed     Pneumococcal Vaccine: Pediatrics (0 to 5 Years) and At-Risk Patients (6 to 64 Years)  Aged Out     IPV IMMUNIZATION  Aged Out     MENINGITIS IMMUNIZATION  Aged Out     HEPATITIS B IMMUNIZATION  Aged Out     Lab work is in process  Mammogram Screening: Mammogram Screening: Recommended mammography every 1-2 years with patient discussion and risk factor consideration    ROS:  Constitutional, HEENT,  "cardiovascular, pulmonary, gi and gu systems are negative, except as otherwise noted.    OBJECTIVE:   /75   Pulse 66   Temp 97.7  F (36.5  C) (Oral)   Ht 1.753 m (5' 9\")   Wt 88.5 kg (195 lb)   BMI 28.80 kg/m   Estimated body mass index is 28.8 kg/m  as calculated from the following:    Height as of this encounter: 1.753 m (5' 9\").    Weight as of this encounter: 88.5 kg (195 lb).  EXAM:   GENERAL: healthy, alert and no distress  EYES: Eyes grossly normal to inspection, PERRL and conjunctivae and sclerae normal  HENT: ear canals and TM's normal, nose and mouth without ulcers or lesions  NECK: no adenopathy, no asymmetry, masses, or scars and thyroid normal to palpation  RESP: lungs clear to auscultation - no rales, rhonchi or wheezes  CV: regular rate and rhythm, normal S1 S2, no S3 or S4, no murmur, click or rub, no peripheral edema and peripheral pulses strong  ABDOMEN: soft, nontender, no hepatosplenomegaly, no masses and bowel sounds normal  MS: no gross musculoskeletal defects noted, no edema  SKIN: no suspicious lesions or rashes  NEURO: Normal strength and tone, mentation intact and speech normal  PSYCH: mentation appears normal, affect normal/bright    Diagnostic Test Results:  Labs reviewed in Epic    ASSESSMENT / PLAN:   1. Encounter for Medicare annual wellness exam  completed    Patient has been advised of split billing requirements and indicates understanding: Yes    COUNSELING:  Reviewed preventive health counseling, as reflected in patient instructions       Regular exercise       Healthy diet/nutrition       Vision screening       Dental care    Estimated body mass index is 28.8 kg/m  as calculated from the following:    Height as of this encounter: 1.753 m (5' 9\").    Weight as of this encounter: 88.5 kg (195 lb).        She reports that she has never smoked. She has never used smokeless tobacco.    Appropriate preventive services were discussed with this patient, including applicable " screening as appropriate for cardiovascular disease, diabetes, osteopenia/osteoporosis, and glaucoma.  As appropriate for age/gender, discussed screening for colorectal cancer, prostate cancer, breast cancer, and cervical cancer. Checklist reviewing preventive services available has been given to the patient.    Reviewed patients plan of care and provided an AVS. The Intermediate Care Plan ( asthma action plan, low back pain action plan, and migraine action plan) for Bryanna meets the Care Plan requirement. This Care Plan has been established and reviewed with the Patient.    Counseling Resources:  ATP IV Guidelines  Pooled Cohorts Equation Calculator  Breast Cancer Risk Calculator  BRCA-Related Cancer Risk Assessment: FHS-7 Tool  FRAX Risk Assessment  ICSI Preventive Guidelines  Dietary Guidelines for Americans, 2010  USDA's MyPlate  ASA Prophylaxis  Lung CA Screening    Meghan Painter MD  [facility]

## 2021-04-22 ENCOUNTER — IMMUNIZATION (OUTPATIENT)
Dept: NURSING | Facility: CLINIC | Age: 66
End: 2021-04-22
Payer: COMMERCIAL

## 2021-04-22 PROCEDURE — 0001A PR COVID VAC PFIZER DIL RECON 30 MCG/0.3 ML IM: CPT

## 2021-04-22 PROCEDURE — 91300 PR COVID VAC PFIZER DIL RECON 30 MCG/0.3 ML IM: CPT

## 2021-04-26 ENCOUNTER — TELEPHONE (OUTPATIENT)
Dept: FAMILY MEDICINE | Facility: CLINIC | Age: 66
End: 2021-04-26

## 2021-04-26 NOTE — TELEPHONE ENCOUNTER
Called patient and gave providers message/ instructions.  Patient states (s)he understands this.   This RN sent [email or web address] message to her with instructions for submitting an E-visit.     Melissa ELENAN, RN

## 2021-04-26 NOTE — TELEPHONE ENCOUNTER
Pt started the Meditarian diet after seeing Dr. Garrido last month to help improve her heart health.   It is a completely different from what she was eating previously.  Fish, chicken, and lots of vegetables. Since doing this she is having loose stools up to 6 times a day.  She has had some accidents.  She has had c diff in past.  She is having the stools after she eats about 45-60 min, and appears to be what she just ate.  Pt asking what she should do?  Mady ELENAN, RN

## 2021-04-26 NOTE — TELEPHONE ENCOUNTER
Have her put in an evisit for her diarrhea,  I doubt what she is eating would give her that much diarrhea  She needs some stool cultures and c diff screen.     Meghan Painter MD

## 2021-04-27 ENCOUNTER — OFFICE VISIT (OUTPATIENT)
Dept: FAMILY MEDICINE | Facility: CLINIC | Age: 66
End: 2021-04-27
Payer: COMMERCIAL

## 2021-04-27 VITALS
TEMPERATURE: 98 F | BODY MASS INDEX: 28.44 KG/M2 | SYSTOLIC BLOOD PRESSURE: 137 MMHG | HEIGHT: 69 IN | WEIGHT: 192 LBS | DIASTOLIC BLOOD PRESSURE: 77 MMHG | HEART RATE: 55 BPM

## 2021-04-27 DIAGNOSIS — R19.7 DIARRHEA, UNSPECIFIED TYPE: Primary | ICD-10-CM

## 2021-04-27 DIAGNOSIS — R19.7 DIARRHEA, UNSPECIFIED TYPE: ICD-10-CM

## 2021-04-27 LAB
ALBUMIN SERPL-MCNC: 3.6 G/DL (ref 3.4–5)
ALP SERPL-CCNC: 75 U/L (ref 40–150)
ALT SERPL W P-5'-P-CCNC: 36 U/L (ref 0–50)
ANION GAP SERPL CALCULATED.3IONS-SCNC: 3 MMOL/L (ref 3–14)
AST SERPL W P-5'-P-CCNC: 27 U/L (ref 0–45)
BILIRUB SERPL-MCNC: 0.4 MG/DL (ref 0.2–1.3)
BUN SERPL-MCNC: 14 MG/DL (ref 7–30)
C DIFF TOX B STL QL: NORMAL
CALCIUM SERPL-MCNC: 9.1 MG/DL (ref 8.5–10.1)
CHLORIDE SERPL-SCNC: 106 MMOL/L (ref 94–109)
CO2 SERPL-SCNC: 30 MMOL/L (ref 20–32)
CREAT SERPL-MCNC: 0.85 MG/DL (ref 0.52–1.04)
CRP SERPL-MCNC: 5 MG/L (ref 0–8)
ERYTHROCYTE [DISTWIDTH] IN BLOOD BY AUTOMATED COUNT: 12.2 % (ref 10–15)
ERYTHROCYTE [SEDIMENTATION RATE] IN BLOOD BY WESTERGREN METHOD: 11 MM/H (ref 0–30)
GFR SERPL CREATININE-BSD FRML MDRD: 71 ML/MIN/{1.73_M2}
GLUCOSE SERPL-MCNC: 85 MG/DL (ref 70–99)
HCT VFR BLD AUTO: 42.1 % (ref 35–47)
HGB BLD-MCNC: 13.9 G/DL (ref 11.7–15.7)
MCH RBC QN AUTO: 30.3 PG (ref 26.5–33)
MCHC RBC AUTO-ENTMCNC: 33 G/DL (ref 31.5–36.5)
MCV RBC AUTO: 92 FL (ref 78–100)
PLATELET # BLD AUTO: 270 10E9/L (ref 150–450)
POTASSIUM SERPL-SCNC: 4 MMOL/L (ref 3.4–5.3)
PROT SERPL-MCNC: 6.9 G/DL (ref 6.8–8.8)
RBC # BLD AUTO: 4.58 10E12/L (ref 3.8–5.2)
SODIUM SERPL-SCNC: 139 MMOL/L (ref 133–144)
SPECIMEN SOURCE: NORMAL
TSH SERPL DL<=0.005 MIU/L-ACNC: 1.95 MU/L (ref 0.4–4)
WBC # BLD AUTO: 4.9 10E9/L (ref 4–11)

## 2021-04-27 PROCEDURE — 85652 RBC SED RATE AUTOMATED: CPT | Performed by: FAMILY MEDICINE

## 2021-04-27 PROCEDURE — 87506 IADNA-DNA/RNA PROBE TQ 6-11: CPT | Performed by: FAMILY MEDICINE

## 2021-04-27 PROCEDURE — 36415 COLL VENOUS BLD VENIPUNCTURE: CPT | Performed by: FAMILY MEDICINE

## 2021-04-27 PROCEDURE — 87329 GIARDIA AG IA: CPT | Mod: 59 | Performed by: FAMILY MEDICINE

## 2021-04-27 PROCEDURE — 87328 CRYPTOSPORIDIUM AG IA: CPT | Performed by: FAMILY MEDICINE

## 2021-04-27 PROCEDURE — 99213 OFFICE O/P EST LOW 20 MIN: CPT | Performed by: FAMILY MEDICINE

## 2021-04-27 PROCEDURE — 80053 COMPREHEN METABOLIC PANEL: CPT | Performed by: FAMILY MEDICINE

## 2021-04-27 PROCEDURE — 84443 ASSAY THYROID STIM HORMONE: CPT | Performed by: FAMILY MEDICINE

## 2021-04-27 PROCEDURE — 85027 COMPLETE CBC AUTOMATED: CPT | Performed by: FAMILY MEDICINE

## 2021-04-27 PROCEDURE — 86140 C-REACTIVE PROTEIN: CPT | Performed by: FAMILY MEDICINE

## 2021-04-27 RX ORDER — CLINDAMYCIN HCL 300 MG
CAPSULE ORAL
COMMUNITY
Start: 2020-09-22 | End: 2022-01-10

## 2021-04-27 ASSESSMENT — MIFFLIN-ST. JEOR: SCORE: 1475.29

## 2021-04-27 NOTE — PROGRESS NOTES
Assessment & Plan     Diarrhea, unspecified type  Await labs, if all normal pt will decrease her fruits and veggies for now til diarrhea settles and then slowly add back in  If not resolved with backing off of fruits and veggies, may need colonoscopy  - Comprehensive metabolic panel  - TSH with free T4 reflex  - CBC with platelets  - Clostridium difficile Toxin B PCR; Future  - Enteric Bacteria and Virus Panel by KHADRA Stool; Future  - Cryptosporidium/Giardia Immunoassay; Future  - Erythrocyte sedimentation rate auto  - CRP inflammation                 No follow-ups on file.    Meghan Painter MD  [facility]    Amelie Gould is a 66 year old who presents for the following health issues     HPI     Diarrhea  Onset/Duration: early march   Description:       Consistency of stool: loose       Blood in stool: no       Number of loose stools past 24 hours: 4  Progression of Symptoms: worsening  Accompanying signs and symptoms:       Fever: no       Nausea/Vomiting: no       Abdominal pain: YES       Weight loss: YES- slight        Episodes of constipation: no  History   Ill contacts: no  Recent use of antibiotics: no   Recent travels: no  Recent medication-new or changes(Rx or OTC): YES- omperazole  Precipitating or alleviating factors: started a mediterranean diet  Therapies tried and outcome: none    Wondering if she should be taking a baby aspirin per day    Changed her diet in that now she is eating a lot of vegetables and fruit.   Now with diarrhea with occasional accident  Takes amoxicillin prior to dental appts.   Diarrhea started 4 days after changing diet  When she eats, the food comes through intact in about one hour  No black of bloody stools  She notes she has lost some weight  Is having intermittent abdominal pain with this as well    Last colonoscopy about 3-4 years ago and was normal        Review of Systems   Constitutional, HEENT, cardiovascular, pulmonary, gi and gu  "systems are negative, except as otherwise noted.      Objective    /77   Pulse 55   Temp 98  F (36.7  C) (Oral)   Ht 1.753 m (5' 9\")   Wt 87.1 kg (192 lb)   BMI 28.35 kg/m    Body mass index is 28.35 kg/m .  Physical Exam   GENERAL: healthy, alert and no distress  RESP: lungs clear to auscultation - no rales, rhonchi or wheezes  CV: regular rate and rhythm, normal S1 S2, no S3 or S4, no murmur, click or rub, no peripheral edema and peripheral pulses strong  ABDOMEN: tenderness RUQ and bowel sounds normal but also right lower ribs    No results found for this or any previous visit (from the past 24 hour(s)).            "

## 2021-04-28 LAB
C COLI+JEJUNI+LARI FUSA STL QL NAA+PROBE: NOT DETECTED
C PARVUM AG STL QL IA: NEGATIVE
EC STX1 GENE STL QL NAA+PROBE: NOT DETECTED
EC STX2 GENE STL QL NAA+PROBE: NOT DETECTED
ENTERIC PATHOGEN COMMENT: NORMAL
G LAMBLIA AG STL QL IA: NEGATIVE
NOROV GI+II ORF1-ORF2 JNC STL QL NAA+PR: NOT DETECTED
RVA NSP5 STL QL NAA+PROBE: NOT DETECTED
SALMONELLA SP RPOD STL QL NAA+PROBE: NOT DETECTED
SHIGELLA SP+EIEC IPAH STL QL NAA+PROBE: NOT DETECTED
SPECIMEN SOURCE: NORMAL
V CHOL+PARA RFBL+TRKH+TNAA STL QL NAA+PR: NOT DETECTED
Y ENTERO RECN STL QL NAA+PROBE: NOT DETECTED

## 2021-04-30 ENCOUNTER — TELEPHONE (OUTPATIENT)
Dept: FAMILY MEDICINE | Facility: CLINIC | Age: 66
End: 2021-04-30

## 2021-04-30 NOTE — TELEPHONE ENCOUNTER
Reason for Call:  Taking a med and also getting vaccine    Detailed comments: Pt is having 2nd Covid vaccine on 5/13/21 and is also having dental work done, she will need to take CLINDMAYCINE for the Dental appt, will that afect her in away as she is having the vaccine too?    Phone Number Patient can be reached at: Home number on file [phone] (home)    Best Time: Anytime    Can we leave a detailed message on this number? YES    Call taken on 4/30/2021 at 7:46 AM by Cathi Richardson

## 2021-05-13 ENCOUNTER — IMMUNIZATION (OUTPATIENT)
Dept: NURSING | Facility: CLINIC | Age: 66
End: 2021-05-13
Attending: FAMILY MEDICINE
Payer: COMMERCIAL

## 2021-05-13 PROCEDURE — 91300 PR COVID VAC PFIZER DIL RECON 30 MCG/0.3 ML IM: CPT

## 2021-05-13 PROCEDURE — 0002A PR COVID VAC PFIZER DIL RECON 30 MCG/0.3 ML IM: CPT

## 2021-07-02 ENCOUNTER — OFFICE VISIT (OUTPATIENT)
Dept: FAMILY MEDICINE | Facility: CLINIC | Age: 66
End: 2021-07-02
Payer: COMMERCIAL

## 2021-07-02 VITALS
DIASTOLIC BLOOD PRESSURE: 71 MMHG | SYSTOLIC BLOOD PRESSURE: 117 MMHG | HEART RATE: 61 BPM | BODY MASS INDEX: 27.25 KG/M2 | OXYGEN SATURATION: 100 % | HEIGHT: 69 IN | WEIGHT: 184 LBS

## 2021-07-02 DIAGNOSIS — M19.90 OSTEOARTHRITIS, UNSPECIFIED OSTEOARTHRITIS TYPE, UNSPECIFIED SITE: Primary | ICD-10-CM

## 2021-07-02 PROCEDURE — 99214 OFFICE O/P EST MOD 30 MIN: CPT | Performed by: NURSE PRACTITIONER

## 2021-07-02 RX ORDER — CELECOXIB 50 MG/1
50 CAPSULE ORAL 2 TIMES DAILY
Qty: 60 CAPSULE | Refills: 1 | Status: SHIPPED | OUTPATIENT
Start: 2021-07-02 | End: 2021-10-22

## 2021-07-02 ASSESSMENT — MIFFLIN-ST. JEOR: SCORE: 1439

## 2021-07-02 NOTE — PROGRESS NOTES
Assessment & Plan     Osteoarthritis, unspecified osteoarthritis type, unspecified site  Pain of multiple joints, likely osteoarthritis.  She had RF negative work-up with ortho in July of 2020, negative RACHID, Rheumatoid factor, CRP and CBC.  Recently normal ESR in work-up for diarrhea.  Has not seen rheumatology.   We discussed referral to rheumatology, she was not interested at this time.   Will try trial of lower dose Celebrex, she states she is sensitive to medication and would prefer to start low.   Can increase to 100 mg BID if necessary and tolerating.  We discussed if ineffective, we could refer her to rheumatology.   - celecoxib (CELEBREX) 50 MG capsule; Take 1 capsule (50 mg) by mouth 2 times daily     See Patient Instructions  Patient Instructions   Trial of low dose Celebrex 50 mg twice daily.   Do not take any other NSAIDs while taking this (ibuprofen, aleve, aspirin).  Call if side effects.  If tolerating well but not as effective as you would like, we can increase to 100 mg twice daily.         Return in about 4 weeks (around 7/30/2021) for If failure to improve, or sooner if worsening.    Marlene Larkin, [facility]    Amelie Gould is a 66 year old who presents for the following health issues     HPI       Patient is here with chronic multiple joint pain.    Present for years.    Reports pain in her hands, ankles, knees and hips.  She has a history of TKA in her right knee in 2018 and had issues with on-going pain following surgery.  Ortho checked labs in 2020 with negative RACHID, Rheumatoid factor, CRP and CBC.  Recently normal ESR in work-up for diarrhea.  Has not seen rheumatology.  Was going to a pain clinic for a period of time and appears she was on gabapentin, lyrica and buprenorphine per previous notes.  She states she hasn't been to the pain clinic in a long time and doesn't plan to return.  Currently not on any prescription medications for pain.  States  "she is very sensitive to medication and most make her feel sick.  She can tolerate ibuprofen or aspirin, but they only help with pain for a few hours.  Hasn't been sleeping well due to pain.  Tylenol does not work.         Review of Systems   Constitutional, HEENT, cardiovascular, pulmonary, gi and gu systems are negative, except as otherwise noted.      Objective    /71   Pulse 61   Ht 1.753 m (5' 9\")   Wt 83.5 kg (184 lb)   SpO2 100%   BMI 27.17 kg/m    Body mass index is 27.17 kg/m .  Physical Exam   GENERAL: healthy, alert and no distress  NECK: no adenopathy, no asymmetry, masses, or scars and thyroid normal to palpation  RESP: lungs clear to auscultation - no rales, rhonchi or wheezes  CV: regular rate and rhythm, normal S1 S2, no S3 or S4, no murmur, click or rub, no peripheral edema and peripheral pulses strong  MS: no gross musculoskeletal defects noted, no edema.  Enlargement of bilateral 2nd MCP joints.   SKIN: no suspicious lesions or rashes  NEURO: Normal strength and tone, mentation intact and speech normal  PSYCH: mentation appears normal, affect normal/bright    CBC RESULTS:   Recent Labs   Lab Test 04/27/21  1017   WBC 4.9   RBC 4.58   HGB 13.9   HCT 42.1   MCV 92   MCH 30.3   MCHC 33.0   RDW 12.2        Sed Rate   Date Value Ref Range Status   04/27/2021 11 0 - 30 mm/h Final     CRP Inflammation   Date Value Ref Range Status   04/27/2021 5.0 0.0 - 8.0 mg/L Final        Ref. Range 7/3/2020 09:27   Rheumatoid Factor Latest Ref Range: <12 IU/mL <7     Anti Nuclear Erica IgG by IFA with Reflex  Order: [id number]  Status:  Final result   Visible to patient:  Yes (Nikki) Dx:  Aftercare following surgery of the mu...    Ref Range & Units 12mo ago    RACHID interpretation NEG^Negative Negative    Comment:                                    Reference range:   <1:40  NEGATIVE   1:40 - 1:80  BORDERLINE POSITIVE                  "

## 2021-07-02 NOTE — PATIENT INSTRUCTIONS
Trial of low dose Celebrex 50 mg twice daily.   Do not take any other NSAIDs while taking this (ibuprofen, aleve, aspirin).  Call if side effects.  If tolerating well but not as effective as you would like, we can increase to 100 mg twice daily.

## 2021-07-29 ENCOUNTER — TELEPHONE (OUTPATIENT)
Dept: INTERNAL MEDICINE | Facility: CLINIC | Age: 66
End: 2021-07-29

## 2021-07-29 DIAGNOSIS — M19.90 OSTEOARTHRITIS, UNSPECIFIED OSTEOARTHRITIS TYPE, UNSPECIFIED SITE: ICD-10-CM

## 2021-07-29 RX ORDER — CELECOXIB 100 MG/1
100 CAPSULE ORAL 2 TIMES DAILY
Status: CANCELLED | OUTPATIENT
Start: 2021-07-29

## 2021-07-29 RX ORDER — CELECOXIB 100 MG/1
100 CAPSULE ORAL 2 TIMES DAILY
Qty: 60 CAPSULE | Refills: 2 | Status: SHIPPED | OUTPATIENT
Start: 2021-07-29 | End: 2021-12-20

## 2021-07-29 NOTE — TELEPHONE ENCOUNTER
This RN called patient at above number and left detailed voicemail for patient with providers message below.   Melissa Layton BSN, RN

## 2021-07-29 NOTE — TELEPHONE ENCOUNTER
"Patient has been taking Celebrex 50 mg BID and is tolerating it well.  Has not noticed a difference.  She is requesting an increase.  Has 6 of the 50 mg capsules left    Per 7/2/2021 OV notes-    \"Trial of low dose Celebrex 50 mg twice daily.   Do not take any other NSAIDs while taking this (ibuprofen, aleve, aspirin).  Call if side effects.  If tolerating well but not as effective as you would like, we can increase to 100 mg twice daily\"    Donovan Mcintyre RN  [facility]        "

## 2021-08-05 DIAGNOSIS — K44.9 HIATAL HERNIA: ICD-10-CM

## 2021-09-12 ENCOUNTER — HEALTH MAINTENANCE LETTER (OUTPATIENT)
Age: 66
End: 2021-09-12

## 2021-10-22 ENCOUNTER — DOCUMENTATION ONLY (OUTPATIENT)
Dept: LAB | Facility: CLINIC | Age: 66
End: 2021-10-22

## 2021-10-22 DIAGNOSIS — E78.5 HYPERLIPIDEMIA WITH TARGET LDL LESS THAN 130: Primary | ICD-10-CM

## 2021-10-22 DIAGNOSIS — K21.9 GASTROESOPHAGEAL REFLUX DISEASE, UNSPECIFIED WHETHER ESOPHAGITIS PRESENT: ICD-10-CM

## 2021-10-22 NOTE — PROGRESS NOTES
.Bryanna BIRCH Thadhyun has an upcoming lab appointment:    Future Appointments   Date Time Provider Department Center   10/25/2021  2:00 PM AN LAB [facility]     Patient is scheduled for the following lab 10/25/21.    Patient either has no future order, or has Health Maintenance labs due. Please review and place either future orders or HMPO (Review of Health Maintenance Protocol Orders), as appropriate.    Health Maintenance Due   Topic     ANNUAL REVIEW OF HM ORDERS      Brittani Valle

## 2021-10-25 ENCOUNTER — LAB (OUTPATIENT)
Dept: LAB | Facility: CLINIC | Age: 66
End: 2021-10-25
Payer: COMMERCIAL

## 2021-10-25 DIAGNOSIS — Z79.1 ENCOUNTER FOR LONG-TERM (CURRENT) USE OF NON-STEROIDAL ANTI-INFLAMMATORIES: ICD-10-CM

## 2021-10-25 DIAGNOSIS — M12.89 TRANSIENT ARTHROPATHY, MULTIPLE SITES: ICD-10-CM

## 2021-10-25 DIAGNOSIS — Z79.1 ENCOUNTER FOR LONG-TERM (CURRENT) USE OF NON-STEROIDAL ANTI-INFLAMMATORIES: Primary | ICD-10-CM

## 2021-10-25 DIAGNOSIS — Z79.899 DRUG THERAPY: ICD-10-CM

## 2021-10-25 LAB
ALT SERPL W P-5'-P-CCNC: 45 U/L (ref 0–50)
AST SERPL W P-5'-P-CCNC: 34 U/L (ref 0–45)
BASOPHILS # BLD AUTO: 0 10E3/UL (ref 0–0.2)
BASOPHILS NFR BLD AUTO: 1 %
CREAT SERPL-MCNC: 0.77 MG/DL (ref 0.52–1.04)
CRP SERPL-MCNC: 3.7 MG/L (ref 0–8)
EOSINOPHIL # BLD AUTO: 0.1 10E3/UL (ref 0–0.7)
EOSINOPHIL NFR BLD AUTO: 1 %
ERYTHROCYTE [DISTWIDTH] IN BLOOD BY AUTOMATED COUNT: 12.2 % (ref 10–15)
ERYTHROCYTE [SEDIMENTATION RATE] IN BLOOD BY WESTERGREN METHOD: 9 MM/HR (ref 0–30)
GFR SERPL CREATININE-BSD FRML MDRD: 81 ML/MIN/1.73M2
HCT VFR BLD AUTO: 43.1 % (ref 35–47)
HGB BLD-MCNC: 14.3 G/DL (ref 11.7–15.7)
LYMPHOCYTES # BLD AUTO: 1.6 10E3/UL (ref 0.8–5.3)
LYMPHOCYTES NFR BLD AUTO: 25 %
MCH RBC QN AUTO: 30.7 PG (ref 26.5–33)
MCHC RBC AUTO-ENTMCNC: 33.2 G/DL (ref 31.5–36.5)
MCV RBC AUTO: 93 FL (ref 78–100)
MONOCYTES # BLD AUTO: 0.5 10E3/UL (ref 0–1.3)
MONOCYTES NFR BLD AUTO: 8 %
NEUTROPHILS # BLD AUTO: 4.1 10E3/UL (ref 1.6–8.3)
NEUTROPHILS NFR BLD AUTO: 65 %
PLATELET # BLD AUTO: 252 10E3/UL (ref 150–450)
RBC # BLD AUTO: 4.66 10E6/UL (ref 3.8–5.2)
WBC # BLD AUTO: 6.3 10E3/UL (ref 4–11)

## 2021-10-25 PROCEDURE — 84450 TRANSFERASE (AST) (SGOT): CPT

## 2021-10-25 PROCEDURE — 85652 RBC SED RATE AUTOMATED: CPT

## 2021-10-25 PROCEDURE — 86140 C-REACTIVE PROTEIN: CPT

## 2021-10-25 PROCEDURE — 86235 NUCLEAR ANTIGEN ANTIBODY: CPT

## 2021-10-25 PROCEDURE — 86431 RHEUMATOID FACTOR QUANT: CPT

## 2021-10-25 PROCEDURE — 85025 COMPLETE CBC W/AUTO DIFF WBC: CPT

## 2021-10-25 PROCEDURE — 86039 ANTINUCLEAR ANTIBODIES (ANA): CPT

## 2021-10-25 PROCEDURE — 86235 NUCLEAR ANTIGEN ANTIBODY: CPT | Mod: 59

## 2021-10-25 PROCEDURE — 82565 ASSAY OF CREATININE: CPT

## 2021-10-25 PROCEDURE — 36415 COLL VENOUS BLD VENIPUNCTURE: CPT

## 2021-10-25 PROCEDURE — 86200 CCP ANTIBODY: CPT

## 2021-10-25 PROCEDURE — 86038 ANTINUCLEAR ANTIBODIES: CPT

## 2021-10-25 PROCEDURE — 84460 ALANINE AMINO (ALT) (SGPT): CPT

## 2021-10-26 LAB
ANA PAT SER IF-IMP: ABNORMAL
ANA SER QL IF: ABNORMAL
ANA TITR SER IF: ABNORMAL {TITER}
RHEUMATOID FACT SER NEPH-ACNC: 7 IU/ML

## 2021-10-27 LAB
CCP AB SER IA-ACNC: 1.9 U/ML
ENA SS-A AB SER IA-ACNC: <0.5 U/ML
ENA SS-A AB SER IA-ACNC: NEGATIVE
ENA SS-B IGG SER IA-ACNC: <0.6 U/ML
ENA SS-B IGG SER IA-ACNC: NEGATIVE

## 2021-11-07 ENCOUNTER — HEALTH MAINTENANCE LETTER (OUTPATIENT)
Age: 66
End: 2021-11-07

## 2021-12-01 ENCOUNTER — TELEPHONE (OUTPATIENT)
Dept: FAMILY MEDICINE | Facility: CLINIC | Age: 66
End: 2021-12-01

## 2021-12-01 ENCOUNTER — LAB (OUTPATIENT)
Dept: LAB | Facility: CLINIC | Age: 66
End: 2021-12-01
Payer: COMMERCIAL

## 2021-12-01 DIAGNOSIS — K21.9 GASTROESOPHAGEAL REFLUX DISEASE, UNSPECIFIED WHETHER ESOPHAGITIS PRESENT: ICD-10-CM

## 2021-12-01 DIAGNOSIS — E78.5 HYPERLIPIDEMIA WITH TARGET LDL LESS THAN 130: ICD-10-CM

## 2021-12-01 LAB
ANION GAP SERPL CALCULATED.3IONS-SCNC: 6 MMOL/L (ref 3–14)
BUN SERPL-MCNC: 15 MG/DL (ref 7–30)
CALCIUM SERPL-MCNC: 9.2 MG/DL (ref 8.5–10.1)
CHLORIDE BLD-SCNC: 105 MMOL/L (ref 94–109)
CHOLEST SERPL-MCNC: 238 MG/DL
CO2 SERPL-SCNC: 26 MMOL/L (ref 20–32)
CREAT SERPL-MCNC: 0.75 MG/DL (ref 0.52–1.04)
ERYTHROCYTE [DISTWIDTH] IN BLOOD BY AUTOMATED COUNT: 13.2 % (ref 10–15)
FASTING STATUS PATIENT QL REPORTED: YES
GFR SERPL CREATININE-BSD FRML MDRD: 83 ML/MIN/1.73M2
GLUCOSE BLD-MCNC: 103 MG/DL (ref 70–99)
HCT VFR BLD AUTO: 41.8 % (ref 35–47)
HDLC SERPL-MCNC: 60 MG/DL
HGB BLD-MCNC: 13.8 G/DL (ref 11.7–15.7)
LDLC SERPL CALC-MCNC: 156 MG/DL
MCH RBC QN AUTO: 31 PG (ref 26.5–33)
MCHC RBC AUTO-ENTMCNC: 33 G/DL (ref 31.5–36.5)
MCV RBC AUTO: 94 FL (ref 78–100)
NONHDLC SERPL-MCNC: 178 MG/DL
PLATELET # BLD AUTO: 280 10E3/UL (ref 150–450)
POTASSIUM BLD-SCNC: 4.1 MMOL/L (ref 3.4–5.3)
RBC # BLD AUTO: 4.45 10E6/UL (ref 3.8–5.2)
SODIUM SERPL-SCNC: 137 MMOL/L (ref 133–144)
TRIGL SERPL-MCNC: 108 MG/DL
WBC # BLD AUTO: 6.1 10E3/UL (ref 4–11)

## 2021-12-01 PROCEDURE — 36415 COLL VENOUS BLD VENIPUNCTURE: CPT

## 2021-12-01 PROCEDURE — 80061 LIPID PANEL: CPT

## 2021-12-01 PROCEDURE — 80048 BASIC METABOLIC PNL TOTAL CA: CPT

## 2021-12-01 PROCEDURE — 85027 COMPLETE CBC AUTOMATED: CPT

## 2021-12-02 NOTE — TELEPHONE ENCOUNTER
I called the patient at [phone] and LM reading the providers note as written.  [facility] number [phone] given to call back and make an appointment.  Shruthi Bob,  [facility]

## 2021-12-02 NOTE — TELEPHONE ENCOUNTER
Reason for call:  Other   Patient called regarding (reason for call): call back  Additional comments: in regards to not fasting for lab test    Phone number to reach patient:  Cell number on file:    Telephone Information:   Mobile [phone]       Best Time:  anytime    Can we leave a detailed message on this number?  YES    Travel screening: Not Applicable

## 2021-12-02 NOTE — TELEPHONE ENCOUNTER
I called the patient again and I left a second voicemail reading the providers note as written.  [place] clinic number given [phone], option #1 to call back and make a follow up appointment with Dr. Nieves to discuss lab results.  Shruthi Bob,  [facility]

## 2021-12-08 DIAGNOSIS — Z79.899 ENCOUNTER FOR LONG-TERM (CURRENT) USE OF MEDICATIONS: Primary | ICD-10-CM

## 2021-12-08 DIAGNOSIS — Z79.1 ENCOUNTER FOR LONG-TERM (CURRENT) USE OF NON-STEROIDAL ANTI-INFLAMMATORIES: ICD-10-CM

## 2021-12-08 DIAGNOSIS — M12.89 TRANSIENT ARTHROPATHY, MULTIPLE SITES: ICD-10-CM

## 2021-12-13 ENCOUNTER — TELEPHONE (OUTPATIENT)
Dept: FAMILY MEDICINE | Facility: CLINIC | Age: 66
End: 2021-12-13
Payer: COMMERCIAL

## 2021-12-13 NOTE — TELEPHONE ENCOUNTER
Patient is calling about her lab results.    She was needing to check her labs for Dr. Spence, when she scheduled her lab appointment for 12/1/21.   The labs that were completed were her Lipid panel, BMP, and CBC for Dr. Berry.  The patient was not fasting, she states she had a very large breakfast that day prior to her lab appointment.    Let patient know, her labs from Dr. Burroughs were not ordered until 12/8/21.       Patient states she will have to wait a long time to get in for another lab appointment and she is hoping to get in sooner.    Routing to AN TC to please assist in getting patient scheduled.    Routing to Dr. Painter to review as JIGNA Stevenson RN, [facility]

## 2021-12-14 ENCOUNTER — LAB (OUTPATIENT)
Dept: LAB | Facility: CLINIC | Age: 66
End: 2021-12-14
Payer: COMMERCIAL

## 2021-12-14 DIAGNOSIS — Z79.899 ENCOUNTER FOR LONG-TERM (CURRENT) USE OF MEDICATIONS: ICD-10-CM

## 2021-12-14 DIAGNOSIS — M12.89 TRANSIENT ARTHROPATHY, MULTIPLE SITES: ICD-10-CM

## 2021-12-14 DIAGNOSIS — Z79.1 ENCOUNTER FOR LONG-TERM (CURRENT) USE OF NON-STEROIDAL ANTI-INFLAMMATORIES: ICD-10-CM

## 2021-12-14 LAB
ALT SERPL W P-5'-P-CCNC: 33 U/L (ref 0–50)
AST SERPL W P-5'-P-CCNC: 23 U/L (ref 0–45)
CREAT SERPL-MCNC: 1.23 MG/DL (ref 0.52–1.04)
CRP SERPL-MCNC: 3 MG/L (ref 0–8)
ERYTHROCYTE [DISTWIDTH] IN BLOOD BY AUTOMATED COUNT: 13.4 % (ref 10–15)
ERYTHROCYTE [SEDIMENTATION RATE] IN BLOOD BY WESTERGREN METHOD: 10 MM/HR (ref 0–30)
GFR SERPL CREATININE-BSD FRML MDRD: 46 ML/MIN/1.73M2
HCT VFR BLD AUTO: 41.8 % (ref 35–47)
HGB BLD-MCNC: 13.9 G/DL (ref 11.7–15.7)
MCH RBC QN AUTO: 31.1 PG (ref 26.5–33)
MCHC RBC AUTO-ENTMCNC: 33.3 G/DL (ref 31.5–36.5)
MCV RBC AUTO: 94 FL (ref 78–100)
PLATELET # BLD AUTO: 259 10E3/UL (ref 150–450)
RBC # BLD AUTO: 4.47 10E6/UL (ref 3.8–5.2)
WBC # BLD AUTO: 6.2 10E3/UL (ref 4–11)

## 2021-12-14 PROCEDURE — 84450 TRANSFERASE (AST) (SGOT): CPT

## 2021-12-14 PROCEDURE — 86200 CCP ANTIBODY: CPT

## 2021-12-14 PROCEDURE — 86038 ANTINUCLEAR ANTIBODIES: CPT

## 2021-12-14 PROCEDURE — 86235 NUCLEAR ANTIGEN ANTIBODY: CPT | Mod: 59

## 2021-12-14 PROCEDURE — 36415 COLL VENOUS BLD VENIPUNCTURE: CPT

## 2021-12-14 PROCEDURE — 86431 RHEUMATOID FACTOR QUANT: CPT

## 2021-12-14 PROCEDURE — 86140 C-REACTIVE PROTEIN: CPT

## 2021-12-14 PROCEDURE — 82565 ASSAY OF CREATININE: CPT

## 2021-12-14 PROCEDURE — 86235 NUCLEAR ANTIGEN ANTIBODY: CPT

## 2021-12-14 PROCEDURE — 84460 ALANINE AMINO (ALT) (SGPT): CPT

## 2021-12-14 PROCEDURE — 85652 RBC SED RATE AUTOMATED: CPT

## 2021-12-14 PROCEDURE — 86039 ANTINUCLEAR ANTIBODIES (ANA): CPT

## 2021-12-14 PROCEDURE — 85027 COMPLETE CBC AUTOMATED: CPT

## 2021-12-15 LAB
ANA PAT SER IF-IMP: ABNORMAL
ANA SER QL IF: ABNORMAL
ANA TITR SER IF: ABNORMAL {TITER}
RHEUMATOID FACT SER NEPH-ACNC: 9 IU/ML

## 2021-12-16 LAB
CCP AB SER IA-ACNC: 1.6 U/ML
ENA SS-A AB SER IA-ACNC: <0.5 U/ML
ENA SS-A AB SER IA-ACNC: NEGATIVE
ENA SS-B IGG SER IA-ACNC: <0.6 U/ML
ENA SS-B IGG SER IA-ACNC: NEGATIVE

## 2021-12-20 ENCOUNTER — OFFICE VISIT (OUTPATIENT)
Dept: FAMILY MEDICINE | Facility: CLINIC | Age: 66
End: 2021-12-20
Payer: COMMERCIAL

## 2021-12-20 ENCOUNTER — TELEPHONE (OUTPATIENT)
Dept: FAMILY MEDICINE | Facility: CLINIC | Age: 66
End: 2021-12-20
Payer: COMMERCIAL

## 2021-12-20 VITALS
BODY MASS INDEX: 28.06 KG/M2 | WEIGHT: 190 LBS | HEART RATE: 71 BPM | TEMPERATURE: 96.7 F | DIASTOLIC BLOOD PRESSURE: 78 MMHG | OXYGEN SATURATION: 98 % | SYSTOLIC BLOOD PRESSURE: 135 MMHG

## 2021-12-20 DIAGNOSIS — R30.0 DYSURIA: Primary | ICD-10-CM

## 2021-12-20 LAB
ALBUMIN UR-MCNC: NEGATIVE MG/DL
APPEARANCE UR: CLEAR
BACTERIA #/AREA URNS HPF: ABNORMAL /HPF
BILIRUB UR QL STRIP: NEGATIVE
COLOR UR AUTO: YELLOW
GLUCOSE UR STRIP-MCNC: NEGATIVE MG/DL
HGB UR QL STRIP: ABNORMAL
KETONES UR STRIP-MCNC: NEGATIVE MG/DL
LEUKOCYTE ESTERASE UR QL STRIP: NEGATIVE
NITRATE UR QL: NEGATIVE
PH UR STRIP: 5.5 [PH] (ref 5–7)
RBC #/AREA URNS AUTO: ABNORMAL /HPF
SP GR UR STRIP: 1.01 (ref 1–1.03)
SQUAMOUS #/AREA URNS AUTO: ABNORMAL /LPF
UROBILINOGEN UR STRIP-ACNC: 0.2 E.U./DL
WBC #/AREA URNS AUTO: ABNORMAL /HPF

## 2021-12-20 PROCEDURE — 81001 URINALYSIS AUTO W/SCOPE: CPT | Performed by: FAMILY MEDICINE

## 2021-12-20 PROCEDURE — 99213 OFFICE O/P EST LOW 20 MIN: CPT | Performed by: FAMILY MEDICINE

## 2021-12-20 NOTE — TELEPHONE ENCOUNTER
Patient is calling with urine odor and frequency for the last two weeks.  Negative for burning with urination and chills.   Patient would like PCP to order a urine sample.    Informed that PCP is out till Thursday.  Advised to submit an e-visit with her symptoms via Dexetra.  Patient said that she is not familiar with electronic new way.  She is only using Dexetra for results.      OV scheduled with Nguyễn Lackey today.    Adwoa Hoskins RN

## 2021-12-20 NOTE — PROGRESS NOTES
SUBJECTIVE:  Bryanna Estevez, a 66 year old female scheduled an appointment to discuss the following issues:  Dysuria  No much utis in past. History  C.diff in past.    No hematuria. Some foul smelling. No vaginal discharge or itching. No fevers or chills.   No nausea, vomiting or diarrhea or constipation.   No std concerns.   Water intake good. Coffee in AM. No ALCOHOL.   No flank pain. No history kidney stones or infections.   Was on methotrexate for RA - stopped because nigthtmares.   Diet pepsi - 3 cans/pop.  Medical, social, surgical, and family histories reviewed.    ROS:  All other ROS negative.     OBJECTIVE:     EXAM:  /78   Pulse 71   Temp (!) 96.7  F (35.9  C)   Wt 86.2 kg (190 lb)   SpO2 98%   BMI 28.06 kg/m     GENERAL APPEARANCE: healthy, alert and no distress  EYES: EOMI,  PERRL  HENT: ear canals and TM's normal and nose and mouth without ulcers or lesions  NECK: no adenopathy, no asymmetry, masses, or scars and thyroid normal to palpation  RESP: lungs clear to auscultation - no rales, rhonchi or wheezes  CV: regular rates and rhythm, normal S1 S2, no S3 or S4 and no murmur, click or rub -  ABDOMEN:  soft, nontender, no HSM or masses and bowel sounds normal  MS: extremities normal- no gross deformities noted, no evidence of inflammation in joints, FROM in all extremities.  NEURO: Normal strength and tone, sensory exam grossly normal, mentation intact and speech normal  PSYCH: mentation appears normal and affect normal/bright    ASSESSMENT / PLAN:  (R30.0) Dysuria  (primary encounter diagnosis)  Comment: not really dysuria but smell. Lab ok.  Plan: UA with Microscopic reflex to Culture - lab         collect, Urine Microscopic        Decrease pop and increase water. Consider macrobid if worse given history c.diff. consider urology if ongoing issues. To ER/ urgent care if fevers or chills or flank pain. Expected course and warning signs reviewed. Call/email with questions/concerns     Ziyad  Venancio Adrian MD

## 2022-01-06 NOTE — PROGRESS NOTES
"  Assessment & Plan     Hyperlipidemia with target LDL less than 130  Not quite at goal, prefers diet and exercise    Rosacea  refill  - metroNIDAZOLE (METROCREAM) 0.75 % external cream; Apply topically 2 times daily PRN    Hiatal hernia  On ppi  - omeprazole (PRILOSEC) 20 MG DR capsule; Take 1 capsule (20 mg) by mouth daily    High priority for 2019-nCoV vaccine  given  - COVID-19,PF,PFIZER (12+ Yrs PURPLE LABEL)             BMI:   Estimated body mass index is 27.62 kg/m  as calculated from the following:    Height as of this encounter: 1.753 m (5' 9\").    Weight as of this encounter: 84.8 kg (187 lb).           No follow-ups on file.    Meghan Painter MD  [facility]    Amelie Gould is a 66 year old who presents for the following health issues     HPI     Follow up on lab work done 12/1/21    The 10-year ASCVD risk score (Jocelyn AN Jr., et al., 2013) is: 6.7%    Values used to calculate the score:      Age: 66 years      Sex: Female      Is Non- : No      Diabetic: No      Tobacco smoker: No      Systolic Blood Pressure: 130 mmHg      Is BP treated: No      HDL Cholesterol: 60 mg/dL      Total Cholesterol: 238 mg/dL      Pt with elevated cholesterol declining statin. She wants to improve diet and exercise and recheck in 3 months  Declines statin at this point    Pt with rosacea needing refill of topical medication  Pt with hiatal hernia needing refill of ppi    Pt due for booster and agreeable        Review of Systems   Constitutional, HEENT, cardiovascular, pulmonary, gi and gu systems are negative, except as otherwise noted.      Objective    BP (!) 149/80   Pulse 98   Temp 97.9  F (36.6  C) (Oral)   Resp 17   Ht 1.753 m (5' 9\")   Wt 84.8 kg (187 lb)   BMI 27.62 kg/m    Body mass index is 27.62 kg/m .  Physical Exam   GENERAL: healthy, alert and no distress  NECK: no adenopathy, no asymmetry, masses, or scars and thyroid normal to palpation  RESP: " lungs clear to auscultation - no rales, rhonchi or wheezes  CV: regular rate and rhythm, normal S1 S2, no S3 or S4, no murmur, click or rub, no peripheral edema and peripheral pulses strong  ABDOMEN: soft, nontender, no hepatosplenomegaly, no masses and bowel sounds normal  MS: no gross musculoskeletal defects noted, no edema    No results found for this or any previous visit (from the past 24 hour(s)).

## 2022-01-10 ENCOUNTER — OFFICE VISIT (OUTPATIENT)
Dept: FAMILY MEDICINE | Facility: CLINIC | Age: 67
End: 2022-01-10
Payer: COMMERCIAL

## 2022-01-10 VITALS
HEIGHT: 69 IN | BODY MASS INDEX: 27.7 KG/M2 | HEART RATE: 98 BPM | TEMPERATURE: 97.9 F | DIASTOLIC BLOOD PRESSURE: 60 MMHG | SYSTOLIC BLOOD PRESSURE: 130 MMHG | RESPIRATION RATE: 17 BRPM | WEIGHT: 187 LBS

## 2022-01-10 DIAGNOSIS — Z23 HIGH PRIORITY FOR 2019-NCOV VACCINE: ICD-10-CM

## 2022-01-10 DIAGNOSIS — L71.9 ROSACEA: ICD-10-CM

## 2022-01-10 DIAGNOSIS — K44.9 HIATAL HERNIA: ICD-10-CM

## 2022-01-10 DIAGNOSIS — E78.5 HYPERLIPIDEMIA WITH TARGET LDL LESS THAN 130: Primary | ICD-10-CM

## 2022-01-10 PROCEDURE — 91300 COVID-19,PF,PFIZER (12+ YRS): CPT | Performed by: FAMILY MEDICINE

## 2022-01-10 PROCEDURE — 0004A COVID-19,PF,PFIZER (12+ YRS): CPT | Performed by: FAMILY MEDICINE

## 2022-01-10 PROCEDURE — 99214 OFFICE O/P EST MOD 30 MIN: CPT | Performed by: FAMILY MEDICINE

## 2022-01-10 ASSESSMENT — MIFFLIN-ST. JEOR: SCORE: 1452.61

## 2022-01-10 ASSESSMENT — PAIN SCALES - GENERAL: PAINLEVEL: NO PAIN (0)

## 2022-04-23 ENCOUNTER — HEALTH MAINTENANCE LETTER (OUTPATIENT)
Age: 67
End: 2022-04-23

## 2022-09-20 ENCOUNTER — TELEPHONE (OUTPATIENT)
Dept: DERMATOLOGY | Facility: CLINIC | Age: 67
End: 2022-09-20

## 2022-09-20 ENCOUNTER — OFFICE VISIT (OUTPATIENT)
Dept: URGENT CARE | Facility: URGENT CARE | Age: 67
End: 2022-09-20
Payer: COMMERCIAL

## 2022-09-20 VITALS
SYSTOLIC BLOOD PRESSURE: 145 MMHG | DIASTOLIC BLOOD PRESSURE: 77 MMHG | WEIGHT: 197.6 LBS | OXYGEN SATURATION: 97 % | TEMPERATURE: 96.5 F | BODY MASS INDEX: 29.18 KG/M2 | HEART RATE: 66 BPM

## 2022-09-20 DIAGNOSIS — R22.0 SCALP MASS: Primary | ICD-10-CM

## 2022-09-20 PROCEDURE — 99213 OFFICE O/P EST LOW 20 MIN: CPT | Performed by: PHYSICIAN ASSISTANT

## 2022-09-20 ASSESSMENT — ENCOUNTER SYMPTOMS
PALPITATIONS: 0
LIGHT-HEADEDNESS: 0
WEAKNESS: 0
EYES NEGATIVE: 1
NECK PAIN: 0
HEADACHES: 0
MYALGIAS: 0
BACK PAIN: 0
VOMITING: 0
WOUND: 0
SHORTNESS OF BREATH: 0
FEVER: 0
COUGH: 0
MUSCULOSKELETAL NEGATIVE: 1
RHINORRHEA: 0
DIZZINESS: 0
ALLERGIC/IMMUNOLOGIC NEGATIVE: 1
DIARRHEA: 0
JOINT SWELLING: 0
CARDIOVASCULAR NEGATIVE: 1
CHILLS: 0
NECK STIFFNESS: 0
RESPIRATORY NEGATIVE: 1
NAUSEA: 0
ENDOCRINE NEGATIVE: 1
SORE THROAT: 0
ARTHRALGIAS: 0

## 2022-09-20 NOTE — TELEPHONE ENCOUNTER
Booked soonest available 10/28. Pt requesting to check if [facility] has anything sooner, will route to them to see.    Brenda DAMICO RN  Dermatology   [phone]

## 2022-09-20 NOTE — PROGRESS NOTES
Chief Complaint:    Chief Complaint   Patient presents with     Ear Problem     Lump behind right ear, noticed 4 days ago, feels like it has gotten bigger and changed shape.      Medical Decision Making:    Vital signs reviewed by Dominic Jensen PA-C  BP (!) 145/77   Pulse 66   Temp (!) 96.5  F (35.8  C) (Tympanic)   Wt 89.6 kg (197 lb 9.6 oz)   SpO2 97%   BMI 29.18 kg/m      Differential Diagnosis:  Cyst, abscess     ASSESSMENT:     1. Scalp mass       PLAN:     No erythema, or abscess amenable to drainage.    Order placed for dermatology for further evaluation and possible removal.    Worrisome symptoms discussed with instructions to go to the ED.  Patient verbalized understanding and agreed with this plan.    Labs:     No results found for any visits on 09/20/22.    Current Meds:    Current Outpatient Medications:      BIOTIN FORTE PO, Take by mouth daily, Disp: , Rfl:      metroNIDAZOLE (METROCREAM) 0.75 % external cream, Apply topically 2 times daily PRN, Disp: 45 g, Rfl: 4     omeprazole (PRILOSEC) 20 MG DR capsule, Take 1 capsule (20 mg) by mouth daily, Disp: 90 capsule, Rfl: 3     Multiple Vitamins-Minerals (MULTIVITAMIN ADULT, MINERALS, PO), , Disp: , Rfl:     Allergies:  Allergies   Allergen Reactions     Liquid Adhesive Rash       Use PAPER tape only     Penicillins Anaphylaxis     Amitriptyline      Other reaction(s): *Unknown     Dexamethasone Sodium Phosphate Other (See Comments)     Nausea, severe burning in the perineum     Elavil [Amitriptyline Hcl]      Zolpidem Other (See Comments)       SUBJECTIVE    HPI: Bryanna Estevez is an 67 year old female who presents for evaluation and treatment of lump on the R posterior scalp.  Symptoms started 4 days ago and have worsened.  She states that the lump seems to have gotten larger and changed shape.  The area is tender when she touches it.  She denies any headache, fever, chills, nausea or vomiting.  No recent illness.    ROS:      Review of Systems    Constitutional: Negative for chills and fever.   HENT: Negative for congestion, ear pain, rhinorrhea and sore throat.         Mass on Scalp   Eyes: Negative.    Respiratory: Negative.  Negative for cough and shortness of breath.    Cardiovascular: Negative.  Negative for chest pain and palpitations.   Gastrointestinal: Negative for diarrhea, nausea and vomiting.   Endocrine: Negative.    Genitourinary: Negative.    Musculoskeletal: Negative.  Negative for arthralgias, back pain, joint swelling, myalgias, neck pain and neck stiffness.   Skin: Negative.  Negative for rash and wound.   Allergic/Immunologic: Negative.  Negative for immunocompromised state.   Neurological: Negative for dizziness, weakness, light-headedness and headaches.        Family History   Family History   Problem Relation Age of Onset     Heart Disease Father         BYPASS X 4 - VALVE REPAIR     Hypertension Father      Cancer Daughter         brain       Social History  Social History     Socioeconomic History     Marital status:      Spouse name: Not on file     Number of children: Not on file     Years of education: Not on file     Highest education level: Not on file   Occupational History     Not on file   Tobacco Use     Smoking status: Never Smoker     Smokeless tobacco: Never Used   Vaping Use     Vaping Use: Never used   Substance and Sexual Activity     Alcohol use: No     Comment: rare     Drug use: No     Sexual activity: Yes     Partners: Male   Other Topics Concern     Parent/sibling w/ CABG, MI or angioplasty before 65F 55M? Not Asked   Social History Narrative     Not on file     Social Determinants of Health     Financial Resource Strain: Not on file   Food Insecurity: Not on file   Transportation Needs: Not on file   Physical Activity: Not on file   Stress: Not on file   Social Connections: Not on file   Intimate Partner Violence: Not on file   Housing Stability: Not on file        Surgical History:  Past Surgical  History:   Procedure Laterality Date     ARTHROSCOPY KNEE RT/LT      both knees     HC REPAIR ACHILLES TENDON,PRIMARY      both     TUBAL LIGATION       ZZC BIOPSY OF STOMACH,BY LAPAROTOMY      exp     ZZC CREATE NEW TUBAL OPENING      reversal tubal ligation        Problem List:  Patient Active Problem List   Diagnosis     CARDIOVASCULAR SCREENING; LDL GOAL LESS THAN 160     Advanced directives, counseling/discussion     BMI 31.0-31.9,adult     GERD (gastroesophageal reflux disease)     Hyperlipidemia with target LDL less than 130     History of Clostridium difficile infection     Tear of meniscus of right knee as current injury, unspecified meniscus, unspecified tear type, subsequent encounter     Palpitations     Dysphagia, unspecified type     Hiatal hernia           OBJECTIVE:     Vital signs noted and reviewed by Dominic Jensen PA-C  BP (!) 145/77   Pulse 66   Temp (!) 96.5  F (35.8  C) (Tympanic)   Wt 89.6 kg (197 lb 9.6 oz)   SpO2 97%   BMI 29.18 kg/m       PEFR:    Physical Exam  Vitals and nursing note reviewed.   Constitutional:       General: She is not in acute distress.     Appearance: She is well-developed. She is not ill-appearing, toxic-appearing or diaphoretic.   HENT:      Head: Normocephalic and atraumatic.        Comments: Roughly 2 cm in diameter firm mass on posterior R scalp.  No erythema, or visible abscess.  Mass is tender to palpation and does not move.     Right Ear: Tympanic membrane and external ear normal. No drainage, swelling or tenderness. Tympanic membrane is not perforated, erythematous, retracted or bulging.      Left Ear: Tympanic membrane and external ear normal. No drainage, swelling or tenderness. Tympanic membrane is not perforated, erythematous, retracted or bulging.      Nose: No mucosal edema, congestion or rhinorrhea.      Right Sinus: No maxillary sinus tenderness or frontal sinus tenderness.      Left Sinus: No maxillary sinus tenderness or frontal sinus  tenderness.      Mouth/Throat:      Pharynx: No pharyngeal swelling, oropharyngeal exudate, posterior oropharyngeal erythema or uvula swelling.      Tonsils: No tonsillar abscesses.   Eyes:      Pupils: Pupils are equal, round, and reactive to light.   Neck:      Trachea: Trachea normal.   Cardiovascular:      Rate and Rhythm: Normal rate and regular rhythm.      Heart sounds: Normal heart sounds, S1 normal and S2 normal. No murmur heard.    No friction rub. No gallop.   Pulmonary:      Effort: Pulmonary effort is normal. No respiratory distress.      Breath sounds: Normal breath sounds. No decreased breath sounds, wheezing, rhonchi or rales.   Abdominal:      General: Bowel sounds are normal. There is no distension.      Palpations: Abdomen is soft. Abdomen is not rigid. There is no mass.      Tenderness: There is no abdominal tenderness. There is no guarding or rebound.   Musculoskeletal:      Cervical back: Full passive range of motion without pain, normal range of motion and neck supple.   Lymphadenopathy:      Cervical: No cervical adenopathy.   Skin:     General: Skin is warm and dry.   Neurological:      Mental Status: She is alert and oriented to person, place, and time.      Cranial Nerves: No cranial nerve deficit.      Deep Tendon Reflexes: Reflexes are normal and symmetric.   Psychiatric:         Behavior: Behavior normal. Behavior is cooperative.         Thought Content: Thought content normal.         Judgment: Judgment normal.             Dominic Jensen PA-C  9/20/2022, 10:52 AM

## 2022-09-20 NOTE — TELEPHONE ENCOUNTER
[facility] Call [facility]    Phone Message    May a detailed message be left on voicemail: yes     Reason for Call: Appointment Intake    Referring Provider Name: CHELI Jensen  Diagnosis and/or Symptoms: mass on R posterior scalp- pt is scheduled on 2/10/23, however, referring provider states that pt needs to be seen urgently for this diagnosis within 3-5 days. Please review and contact pt, thanks!    Action Taken: Message routed to: [facility]  Travel Screening: Not Applicable

## 2022-10-30 ENCOUNTER — HEALTH MAINTENANCE LETTER (OUTPATIENT)
Age: 67
End: 2022-10-30

## 2022-11-11 ENCOUNTER — TRANSFERRED RECORDS (OUTPATIENT)
Dept: HEALTH INFORMATION MANAGEMENT | Facility: CLINIC | Age: 67
End: 2022-11-11

## 2022-11-19 ENCOUNTER — OFFICE VISIT (OUTPATIENT)
Dept: URGENT CARE | Facility: URGENT CARE | Age: 67
End: 2022-11-19
Payer: COMMERCIAL

## 2022-11-19 VITALS
DIASTOLIC BLOOD PRESSURE: 78 MMHG | BODY MASS INDEX: 29.09 KG/M2 | WEIGHT: 197 LBS | RESPIRATION RATE: 16 BRPM | TEMPERATURE: 98.6 F | HEART RATE: 53 BPM | OXYGEN SATURATION: 98 % | SYSTOLIC BLOOD PRESSURE: 153 MMHG

## 2022-11-19 DIAGNOSIS — R31.0 GROSS HEMATURIA: ICD-10-CM

## 2022-11-19 DIAGNOSIS — N30.01 ACUTE CYSTITIS WITH HEMATURIA: Primary | ICD-10-CM

## 2022-11-19 DIAGNOSIS — Z86.19 HISTORY OF CLOSTRIDIUM DIFFICILE INFECTION: ICD-10-CM

## 2022-11-19 DIAGNOSIS — R30.0 DYSURIA: ICD-10-CM

## 2022-11-19 LAB
ALBUMIN UR-MCNC: 100 MG/DL
APPEARANCE UR: ABNORMAL
BACTERIA #/AREA URNS HPF: ABNORMAL /HPF
BILIRUB UR QL STRIP: NEGATIVE
COLOR UR AUTO: YELLOW
GLUCOSE UR STRIP-MCNC: NEGATIVE MG/DL
HGB UR QL STRIP: ABNORMAL
KETONES UR STRIP-MCNC: NEGATIVE MG/DL
LEUKOCYTE ESTERASE UR QL STRIP: ABNORMAL
NITRATE UR QL: NEGATIVE
PH UR STRIP: 7 [PH] (ref 5–7)
RBC #/AREA URNS AUTO: >100 /HPF
SP GR UR STRIP: 1.01 (ref 1–1.03)
SQUAMOUS #/AREA URNS AUTO: ABNORMAL /LPF
UROBILINOGEN UR STRIP-ACNC: 0.2 E.U./DL
WBC #/AREA URNS AUTO: ABNORMAL /HPF
WBC CLUMPS #/AREA URNS HPF: PRESENT /HPF

## 2022-11-19 PROCEDURE — 99213 OFFICE O/P EST LOW 20 MIN: CPT | Performed by: INTERNAL MEDICINE

## 2022-11-19 PROCEDURE — 87086 URINE CULTURE/COLONY COUNT: CPT | Performed by: INTERNAL MEDICINE

## 2022-11-19 PROCEDURE — 81001 URINALYSIS AUTO W/SCOPE: CPT | Performed by: INTERNAL MEDICINE

## 2022-11-19 RX ORDER — SULFAMETHOXAZOLE/TRIMETHOPRIM 800-160 MG
1 TABLET ORAL 2 TIMES DAILY
Qty: 14 TABLET | Refills: 0 | Status: SHIPPED | OUTPATIENT
Start: 2022-11-19 | End: 2022-11-26

## 2022-11-19 ASSESSMENT — ENCOUNTER SYMPTOMS
VOMITING: 0
FEVER: 0
BACK PAIN: 0
NAUSEA: 0
DYSURIA: 0

## 2022-11-19 NOTE — PATIENT INSTRUCTIONS
High suspect blood in urine from infection  Not from vagina    Bactrim 2 x day 1 week    Probiotics 2 x day with history of C difficule    If symptoms progress, go to ER

## 2022-11-19 NOTE — PROGRESS NOTES
ASSESSMENT AND PLAN:      ICD-10-CM    1. Acute cystitis with hematuria  N30.01 sulfamethoxazole-trimethoprim (BACTRIM DS) 800-160 MG tablet      2. Dysuria  R30.0 UA macro with reflex to Microscopic and Culture - Clinc Collect     Urine Microscopic Exam     Urine Culture      3. Gross hematuria  R31.0       4. History of Clostridium difficile infection  Z86.19           Differential Diagnosis:    Blood in urine: UTI,  Stone, Interstitial Cystitis and mass  Vaginal bleeding    Patient Instructions   High suspect blood in urine from infection  Not from vagina    Bactrim 2 x day 1 week    Probiotics 2 x day with history of C difficule    If symptoms progress, go to ER          No follow-ups on file.        Tg Brown MD  [facility]    Subjective     Bryanna Estevez is a 67 year old who presents for Patient presents with:  UTI: Blood in urine. Pain. Urgency/pressure. Since this am    an established patient of [facility].    UTI    Onset of symptoms was today  Woke up  Noticed blood in toilet.  Had multiple episodes throughout the morning,   Trickles of clotting blood  Current and associated symptoms frequency, urgency, blood in urine, suprapubic pain and pressure and voiding in small amounts  With wiping will have a clot  Treatment and measures tried Increase fluid intake    Hx menopausal vagina bleeding 2012 - evaluated for this    Review of Systems   Constitutional: Negative for fever.   Gastrointestinal: Negative for nausea and vomiting.   Genitourinary: Negative for dysuria and vaginal discharge.   Musculoskeletal: Negative for back pain.           Objective    BP (!) 153/78   Pulse 53   Temp 98.6  F (37  C) (Tympanic)   Resp 16   Wt 89.4 kg (197 lb)   SpO2 98%   BMI 29.09 kg/m    Physical Exam  Vitals reviewed.   Constitutional:       Appearance: Normal appearance.   Abdominal:      Palpations: Abdomen is soft.      Tenderness: There is no abdominal tenderness. There is no right  CVA tenderness or left CVA tenderness.   Genitourinary:     General: Normal vulva.      Comments: Urethra - normal - no polyp  Vaginal bimanual exam - no blood    Neurological:      Mental Status: She is alert.            Results for orders placed or performed in visit on 11/19/22 (from the past 24 hour(s))   UA macro with reflex to Microscopic and Culture - Clinc Collect    Specimen: Urine, Clean Catch   Result Value Ref Range    Color Urine Yellow Colorless, Straw, Light Yellow, Yellow    Appearance Urine Cloudy (A) Clear    Glucose Urine Negative Negative mg/dL    Bilirubin Urine Negative Negative    Ketones Urine Negative Negative mg/dL    Specific Gravity Urine 1.015 1.003 - 1.035    Blood Urine Large (A) Negative    pH Urine 7.0 5.0 - 7.0    Protein Albumin Urine 100 (A) Negative mg/dL    Urobilinogen Urine 0.2 0.2, 1.0 E.U./dL    Nitrite Urine Negative Negative    Leukocyte Esterase Urine Moderate (A) Negative   Urine Microscopic Exam   Result Value Ref Range    Bacteria Urine Few (A) None Seen /HPF    RBC Urine >100 (A) 0-2 /HPF /HPF    WBC Urine 10-25 (A) 0-5 /HPF /HPF    Squamous Epithelials Urine Few (A) None Seen /LPF    WBC Clumps Urine Present (A) None Seen /HPF

## 2022-11-21 LAB — BACTERIA UR CULT: NO GROWTH

## 2022-12-07 ENCOUNTER — VIRTUAL VISIT (OUTPATIENT)
Dept: FAMILY MEDICINE | Facility: CLINIC | Age: 67
End: 2022-12-07
Payer: COMMERCIAL

## 2022-12-07 DIAGNOSIS — J06.9 VIRAL URI: Primary | ICD-10-CM

## 2022-12-07 PROCEDURE — 99213 OFFICE O/P EST LOW 20 MIN: CPT | Mod: 95 | Performed by: NURSE PRACTITIONER

## 2022-12-07 NOTE — PROGRESS NOTES
Bryanna is a 67 year old who is being evaluated via a billable video visit.      How would you like to obtain your AVS? MyChart  If the video visit is dropped, the invitation should be resent by: Text to cell phone: [phone]  Will anyone else be joining your video visit? No        Assessment & Plan     (J06.9) Viral URI  (primary encounter diagnosis)  Comment: suspect viral etiology with evolving symptoms.Will continue to watch and wait. Can try Xlear nasal spray. Follow-up with symptoms are worsening or fevers develop. Suspect this to last 4 weeks   Plan:       DEVEN Mendez CNP  [facility]    Amelie Gould is a 67 year old, presenting for the following health issues:    Sinus Problem      HPI     Has a stuffy/runny nose, cough for 1.5 weeks and then a sore throat   The last 4-5 days has been blowing nose of thick orange drainage   Used afrin the first few days of symptoms but not since   Last night had a little pounding in left ear  No fevers. Temp 97.5            Review of Systems   Detailed as above         Objective           Vitals:  No vitals were obtained today due to virtual visit.  129/83  HR 64      Physical Exam   GENERAL: Healthy, alert and no distress  EYES: Eyes grossly normal to inspection.  No discharge or erythema, or obvious scleral/conjunctival abnormalities.  RESP: No audible wheeze, cough, or visible cyanosis.  No visible retractions or increased work of breathing.    SKIN: Visible skin clear. No significant rash, abnormal pigmentation or lesions.  NEURO: Cranial nerves grossly intact.  Mentation and speech appropriate for age.  PSYCH: Mentation appears normal, affect normal/bright, judgement and insight intact, normal speech and appearance well-groomed.                Video-Visit Details    Video Start Time: 2:11 PM    Type of service:  Video Visit    Video End Time:2:20 PM    Originating Location (pt. Location): Home    Distant Location (provider  location):  On-site    Platform used for Video Visit: Asim

## 2022-12-13 DIAGNOSIS — K44.9 HIATAL HERNIA: ICD-10-CM

## 2022-12-26 ENCOUNTER — OFFICE VISIT (OUTPATIENT)
Dept: FAMILY MEDICINE | Facility: CLINIC | Age: 67
End: 2022-12-26
Payer: COMMERCIAL

## 2022-12-26 VITALS
DIASTOLIC BLOOD PRESSURE: 83 MMHG | RESPIRATION RATE: 17 BRPM | WEIGHT: 197 LBS | BODY MASS INDEX: 29.18 KG/M2 | OXYGEN SATURATION: 97 % | HEIGHT: 69 IN | SYSTOLIC BLOOD PRESSURE: 149 MMHG | TEMPERATURE: 97.8 F | HEART RATE: 55 BPM

## 2022-12-26 DIAGNOSIS — R07.89 CHEST TIGHTNESS: ICD-10-CM

## 2022-12-26 DIAGNOSIS — E78.5 HYPERLIPIDEMIA WITH TARGET LDL LESS THAN 130: ICD-10-CM

## 2022-12-26 DIAGNOSIS — Z00.00 ENCOUNTER FOR MEDICARE ANNUAL WELLNESS EXAM: Primary | ICD-10-CM

## 2022-12-26 DIAGNOSIS — R00.2 PALPITATIONS: ICD-10-CM

## 2022-12-26 DIAGNOSIS — R60.0 PEDAL EDEMA: ICD-10-CM

## 2022-12-26 DIAGNOSIS — Z12.31 VISIT FOR SCREENING MAMMOGRAM: ICD-10-CM

## 2022-12-26 DIAGNOSIS — K44.9 HIATAL HERNIA: ICD-10-CM

## 2022-12-26 LAB
ALBUMIN SERPL-MCNC: 3.4 G/DL (ref 3.4–5)
ALP SERPL-CCNC: 91 U/L (ref 40–150)
ALT SERPL W P-5'-P-CCNC: 33 U/L (ref 0–50)
ANION GAP SERPL CALCULATED.3IONS-SCNC: 3 MMOL/L (ref 3–14)
AST SERPL W P-5'-P-CCNC: 24 U/L (ref 0–45)
BASOPHILS # BLD AUTO: 0 10E3/UL (ref 0–0.2)
BASOPHILS NFR BLD AUTO: 0 %
BILIRUB SERPL-MCNC: 0.4 MG/DL (ref 0.2–1.3)
BUN SERPL-MCNC: 15 MG/DL (ref 7–30)
CALCIUM SERPL-MCNC: 9.2 MG/DL (ref 8.5–10.1)
CHLORIDE BLD-SCNC: 107 MMOL/L (ref 94–109)
CHOLEST SERPL-MCNC: 271 MG/DL
CO2 SERPL-SCNC: 30 MMOL/L (ref 20–32)
CREAT SERPL-MCNC: 0.8 MG/DL (ref 0.52–1.04)
EOSINOPHIL # BLD AUTO: 0.1 10E3/UL (ref 0–0.7)
EOSINOPHIL NFR BLD AUTO: 1 %
ERYTHROCYTE [DISTWIDTH] IN BLOOD BY AUTOMATED COUNT: 12.6 % (ref 10–15)
FASTING STATUS PATIENT QL REPORTED: YES
GFR SERPL CREATININE-BSD FRML MDRD: 80 ML/MIN/1.73M2
GLUCOSE BLD-MCNC: 105 MG/DL (ref 70–99)
HCT VFR BLD AUTO: 43 % (ref 35–47)
HDLC SERPL-MCNC: 65 MG/DL
HGB BLD-MCNC: 14.4 G/DL (ref 11.7–15.7)
LDLC SERPL CALC-MCNC: 177 MG/DL
LYMPHOCYTES # BLD AUTO: 1.9 10E3/UL (ref 0.8–5.3)
LYMPHOCYTES NFR BLD AUTO: 28 %
MCH RBC QN AUTO: 31.9 PG (ref 26.5–33)
MCHC RBC AUTO-ENTMCNC: 33.5 G/DL (ref 31.5–36.5)
MCV RBC AUTO: 95 FL (ref 78–100)
MONOCYTES # BLD AUTO: 0.5 10E3/UL (ref 0–1.3)
MONOCYTES NFR BLD AUTO: 8 %
NEUTROPHILS # BLD AUTO: 4.4 10E3/UL (ref 1.6–8.3)
NEUTROPHILS NFR BLD AUTO: 64 %
NONHDLC SERPL-MCNC: 206 MG/DL
PLATELET # BLD AUTO: 275 10E3/UL (ref 150–450)
POTASSIUM BLD-SCNC: 4.3 MMOL/L (ref 3.4–5.3)
PROT SERPL-MCNC: 7.2 G/DL (ref 6.8–8.8)
RBC # BLD AUTO: 4.52 10E6/UL (ref 3.8–5.2)
SODIUM SERPL-SCNC: 140 MMOL/L (ref 133–144)
TRIGL SERPL-MCNC: 144 MG/DL
TSH SERPL DL<=0.005 MIU/L-ACNC: 2.93 MU/L (ref 0.4–4)
WBC # BLD AUTO: 6.9 10E3/UL (ref 4–11)

## 2022-12-26 PROCEDURE — 90715 TDAP VACCINE 7 YRS/> IM: CPT | Performed by: FAMILY MEDICINE

## 2022-12-26 PROCEDURE — 80061 LIPID PANEL: CPT | Performed by: FAMILY MEDICINE

## 2022-12-26 PROCEDURE — 85025 COMPLETE CBC W/AUTO DIFF WBC: CPT | Performed by: FAMILY MEDICINE

## 2022-12-26 PROCEDURE — 2894A CBC WITH PLATELETS AND DIFFERENTIAL: CPT | Performed by: FAMILY MEDICINE

## 2022-12-26 PROCEDURE — 84443 ASSAY THYROID STIM HORMONE: CPT | Performed by: FAMILY MEDICINE

## 2022-12-26 PROCEDURE — 93000 ELECTROCARDIOGRAM COMPLETE: CPT | Performed by: FAMILY MEDICINE

## 2022-12-26 PROCEDURE — G0439 PPPS, SUBSEQ VISIT: HCPCS | Performed by: FAMILY MEDICINE

## 2022-12-26 PROCEDURE — 36415 COLL VENOUS BLD VENIPUNCTURE: CPT | Performed by: FAMILY MEDICINE

## 2022-12-26 PROCEDURE — 90471 IMMUNIZATION ADMIN: CPT | Performed by: FAMILY MEDICINE

## 2022-12-26 PROCEDURE — 99214 OFFICE O/P EST MOD 30 MIN: CPT | Mod: 25 | Performed by: FAMILY MEDICINE

## 2022-12-26 PROCEDURE — 80053 COMPREHEN METABOLIC PANEL: CPT | Performed by: FAMILY MEDICINE

## 2022-12-26 ASSESSMENT — PAIN SCALES - GENERAL: PAINLEVEL: NO PAIN (0)

## 2022-12-26 NOTE — PATIENT INSTRUCTIONS
Patient Education   Personalized Prevention Plan  You are due for the preventive services outlined below.  Your care team is available to assist you in scheduling these services.  If you have already completed any of these items, please share that information with your care team to update in your medical record.  Health Maintenance Due   Topic Date Due     Osteoporosis Screening  Never done     ANNUAL REVIEW OF HM ORDERS  Never done     Zoster (Shingles) Vaccine (1 of 2) Never done     Pneumococcal Vaccine (1 - PCV) Never done     Mammogram  02/07/2022     Annual Wellness Visit  02/18/2022     COVID-19 Vaccine (4 - Booster for Pfizer series) 03/07/2022     Flu Vaccine (1) 09/01/2022

## 2022-12-26 NOTE — PROGRESS NOTES
"      Amelie Gould is a 67 year old, presenting for the following health issues:  Hypertension      History of Present Illness       Hypertension: She presents for follow up of hypertension.  She does check blood pressure  regularly outside of the clinic. Outside blood pressures have been over 140/90. She does not follow a low salt diet.        Would like to be tested for covid antigen, is fasting today  Discussed covid testing and her goal with it.   Discussed testing would not answer the question she is seeking    Gets swelling in legs  Better with laying down  Worse after sitting  Getting more varicose veins.     Having some palpitations in the middle of the night  Getting once or twice a month maybe lasting a minute  Gets chest tightness occasionally, every few month?  For few minutes?  Not related to activity?  Had it briefly today with laying down.   No other symptoms  Is on ppi for gerd  Stopped caffiene about a year ago  Not sure if it could be from gerd  Is not able to recall any details with these episodes.      Review of Systems   Constitutional, HEENT, cardiovascular, pulmonary, gi and gu systems are negative, except as otherwise noted.      Objective    BP (!) 149/83   Pulse 55   Temp 97.8  F (36.6  C) (Oral)   Resp 17   Ht 1.753 m (5' 9\")   Wt 89.4 kg (197 lb)   SpO2 97%   BMI 29.09 kg/m    Body mass index is 29.09 kg/m .  Physical Exam   GENERAL: healthy, alert and no distress  EYES: Eyes grossly normal to inspection, PERRL and conjunctivae and sclerae normal  HENT: ear canals and TM's normal, nose and mouth without ulcers or lesions  NECK: no adenopathy, no asymmetry, masses, or scars and thyroid normal to palpation  RESP: lungs clear to auscultation - no rales, rhonchi or wheezes  CV: regular rate and rhythm, normal S1 S2, no S3 or S4, no murmur, click or rub, no peripheral edema and peripheral pulses strong  ABDOMEN: soft, nontender, no hepatosplenomegaly, no masses and bowel sounds " "normal  MS: no gross musculoskeletal defects noted, no edema  SKIN: no suspicious lesions or rashes  NEURO: Normal strength and tone, mentation intact and speech normal  PSYCH: mentation appears normal, affect normal/bright    EKG - Reviewed and interpreted by me appears normal, NSR, sinus bradycardia, normal axis, normal intervals, no acute ST/T changes c/w ischemia, no LVH by voltage criteria, unchanged from previous tracings                SUBJECTIVE:   Bryanna Estevez is a 67 year old female who presents for Preventive Visit.        Are you in the first 12 months of your Medicare Part B coverage?  No     Physical Health:    In general, how would you rate your overall physical health? good    Outside of work, how many days during the week do you exercise? 6-7 days/week    Outside of work, approximately how many minutes a day do you exercise?15-30 minutes    If you drink alcohol do you typically have >3 drinks per day or >7 drinks per week? No    Do you usually eat at least 4 servings of fruit and vegetables a day, include whole grains & fiber and avoid regularly eating high fat or \"junk\" foods? Yes    Do you have any problems taking medications regularly?  No    Do you have any side effects from medications? none    Needs assistance for the following daily activities: no assistance needed    Which of the following safety concerns are present in your home?  none identified     Hearing impairment: No    In the past 6 months, have you been bothered by leaking of urine? no    Mental Health:    In general, how would you rate your overall mental or emotional health? excellent  PHQ-2 Score:      Do you feel safe in your environment? No    Have you ever done Advance Care Planning? (For example, a Health Directive, POLST, or a discussion with a medical provider or your loved ones about your wishes): No, advance care planning information given to patient to review.  Patient declined advance care planning discussion at this " time.    Additional concerns to address?      Fall risk:  Fallen 2 or more times in the past year?: No  Any fall with injury in the past year?: No    Cognitive Screening: no concerns based on direct observation    Do you have sleep apnea, excessive snoring or daytime drowsiness?: no            Reviewed and updated as needed this visit by clinical staff   Tobacco  Allergies  Meds              Reviewed and updated as needed this visit by Provider                 Social History     Tobacco Use     Smoking status: Never     Smokeless tobacco: Never   Substance Use Topics     Alcohol use: Yes     Comment: 1 drink per month                           Current providers sharing in care for this patient include:   Patient Care Team:  Meghan Painter MD as PCP - General (Family Practice)  Meghan Painter MD as Assigned PCP  Dominic Jensen PA-C as Physician Assistant (Emergency Medicine)  Stephenie Talbert APRN CNP as Nurse Practitioner (Dermatology)    The following health maintenance items are reviewed in Epic and correct as of today:  Health Maintenance   Topic Date Due     DEXA  Never done     ANNUAL REVIEW OF HM ORDERS  Never done     ZOSTER IMMUNIZATION (1 of 2) Never done     Pneumococcal Vaccine: 65+ Years (1 - PCV) Never done     MAMMO SCREENING  02/07/2022     MEDICARE ANNUAL WELLNESS VISIT  02/18/2022     COVID-19 Vaccine (4 - Booster for Pfizer series) 03/07/2022     INFLUENZA VACCINE (1) 09/01/2022     FALL RISK ASSESSMENT  12/26/2023     COLORECTAL CANCER SCREENING  06/26/2024     ADVANCE CARE PLANNING  02/18/2026     LIPID  12/01/2026     DTAP/TDAP/TD IMMUNIZATION (4 - Td or Tdap) 12/26/2032     HEPATITIS C SCREENING  Completed     PHQ-2 (once per calendar year)  Completed     IPV IMMUNIZATION  Aged Out     MENINGITIS IMMUNIZATION  Aged Out     PAP  Discontinued     Lab work is in process  Mammogram Screening: Mammogram Screening: Recommended mammography every 1-2 years with patient  "discussion and risk factor consideration    ROS:  Constitutional, HEENT, cardiovascular, pulmonary, gi and gu systems are negative, except as otherwise noted.    OBJECTIVE:   BP (!) 149/83   Pulse 55   Temp 97.8  F (36.6  C) (Oral)   Resp 17   Ht 1.753 m (5' 9\")   Wt 89.4 kg (197 lb)   SpO2 97%   BMI 29.09 kg/m   Estimated body mass index is 29.09 kg/m  as calculated from the following:    Height as of this encounter: 1.753 m (5' 9\").    Weight as of this encounter: 89.4 kg (197 lb).  EXAM:   GENERAL: healthy, alert and no distress  EYES: Eyes grossly normal to inspection, PERRL and conjunctivae and sclerae normal  HENT: ear canals and TM's normal, nose and mouth without ulcers or lesions  NECK: no adenopathy, no asymmetry, masses, or scars and thyroid normal to palpation  RESP: lungs clear to auscultation - no rales, rhonchi or wheezes  CV: regular rate and rhythm, normal S1 S2, no S3 or S4, no murmur, click or rub, no peripheral edema and peripheral pulses strong  ABDOMEN: soft, nontender, no hepatosplenomegaly, no masses and bowel sounds normal  MS: no gross musculoskeletal defects noted, no edema  SKIN: no suspicious lesions or rashes  NEURO: Normal strength and tone, mentation intact and speech normal  PSYCH: mentation appears normal, affect normal/bright    Diagnostic Test Results:  Labs reviewed in Epic  Results for orders placed or performed in visit on 12/26/22 (from the past 24 hour(s))   **CBC with platelets differential FUTURE 2mo    Narrative    The following orders were created for panel order **CBC with platelets differential FUTURE 2mo.  Procedure                               Abnormality         Status                     ---------                               -----------         ------                     CBC with platelets and d...[[id number]]                      Final result                 Please view results for these tests on the individual orders.   CBC with platelets and differential "   Result Value Ref Range    WBC Count 6.9 4.0 - 11.0 10e3/uL    RBC Count 4.52 3.80 - 5.20 10e6/uL    Hemoglobin 14.4 11.7 - 15.7 g/dL    Hematocrit 43.0 35.0 - 47.0 %    MCV 95 78 - 100 fL    MCH 31.9 26.5 - 33.0 pg    MCHC 33.5 31.5 - 36.5 g/dL    RDW 12.6 10.0 - 15.0 %    Platelet Count 275 150 - 450 10e3/uL    % Neutrophils 64 %    % Lymphocytes 28 %    % Monocytes 8 %    % Eosinophils 1 %    % Basophils 0 %    Absolute Neutrophils 4.4 1.6 - 8.3 10e3/uL    Absolute Lymphocytes 1.9 0.8 - 5.3 10e3/uL    Absolute Monocytes 0.5 0.0 - 1.3 10e3/uL    Absolute Eosinophils 0.1 0.0 - 0.7 10e3/uL    Absolute Basophils 0.0 0.0 - 0.2 10e3/uL     EKG - negative other than bradycardia which has been consistent    ASSESSMENT / PLAN:   (Z00.00) Encounter for Medicare annual wellness exam  (primary encounter diagnosis)  Comment: completed  Plan:     (R00.2) Palpitations  Comment: pretty rare and brief. No further work up unless occur frequent or long enough to catch on zio patch or become more symptomatic  Plan: **CBC with platelets differential FUTURE 2mo,         **Comprehensive metabolic panel FUTURE 2mo,         **TSH with free T4 reflex FUTURE 2mo, EKG         12-lead complete w/read - Clinics,         Echocardiogram Complete, CANCELED:         Echocardiogram Complete            (R07.89) Chest tightness  Comment: in general has some tightness intermittently. Not clear on frequency but brief  Suspect is it more related to her gerd. She also has hiatal hernia  Will have her try some maalox with next episode  Get echo as she has upcoming surgery  Plan: Echocardiogram Complete, CANCELED:         Echocardiogram Exercise Stress            (R60.0) Pedal edema  Comment: suspect just venous insufficiency but will check labs and get echo  Plan: **CBC with platelets differential FUTURE 2mo,         **Comprehensive metabolic panel FUTURE 2mo,         **TSH with free T4 reflex FUTURE 2mo, EKG         12-lead complete w/read - Clinics,  "CANCELED:         Echocardiogram Complete            (E78.5) Hyperlipidemia with target LDL less than 130  Comment: recheck  Plan: Lipid panel reflex to direct LDL Fasting            (Z12.31) Visit for screening mammogram  Comment:   Plan: MA SCREENING DIGITAL BILAT - Future  (s+30)            (K44.9) Hiatal hernia  Comment:   Plan: omeprazole (PRILOSEC) 20 MG DR capsule              Patient has been advised of split billing requirements and indicates understanding: Yes    COUNSELING:  Reviewed preventive health counseling, as reflected in patient instructions       Regular exercise       Healthy diet/nutrition       Vision screening       Dental care    Estimated body mass index is 29.09 kg/m  as calculated from the following:    Height as of this encounter: 1.753 m (5' 9\").    Weight as of this encounter: 89.4 kg (197 lb).        She reports that she has never smoked. She has never used smokeless tobacco.    Appropriate preventive services were discussed with this patient, including applicable screening as appropriate for cardiovascular disease, diabetes, osteopenia/osteoporosis, and glaucoma.  As appropriate for age/gender, discussed screening for colorectal cancer, prostate cancer, breast cancer, and cervical cancer. Checklist reviewing preventive services available has been given to the patient.    Reviewed patients plan of care and provided an AVS. The Intermediate Care Plan ( asthma action plan, low back pain action plan, and migraine action plan) for Bryanna meets the Care Plan requirement. This Care Plan has been established and reviewed with the Patient.    Counseling Resources:  ATP IV Guidelines  Pooled Cohorts Equation Calculator  Breast Cancer Risk Calculator  BRCA-Related Cancer Risk Assessment: FHS-7 Tool  FRAX Risk Assessment  ICSI Preventive Guidelines  Dietary Guidelines for Americans, 2010  USDA's MyPlate  ASA Prophylaxis  Lung CA Screening    Meghan Painter MD  [facility] " [place]

## 2022-12-28 ENCOUNTER — TELEPHONE (OUTPATIENT)
Dept: FAMILY MEDICINE | Facility: CLINIC | Age: 67
End: 2022-12-28

## 2022-12-28 DIAGNOSIS — Z12.31 ENCOUNTER FOR SCREENING MAMMOGRAM FOR BREAST CANCER: Primary | ICD-10-CM

## 2022-12-28 NOTE — TELEPHONE ENCOUNTER
Has a mammogram scheduled for [facility] in Feb 2023.  Per insurance company please order a 2 but yet run a 3.

## 2022-12-28 NOTE — TELEPHONE ENCOUNTER
Spoke with Joyce who clarified the following:  Per insurance company, please order a 2D mammogram, but most mammograms are now 3D.    Mili DURHAM    [facility]

## 2023-01-05 ENCOUNTER — ANCILLARY PROCEDURE (OUTPATIENT)
Dept: CARDIOLOGY | Facility: CLINIC | Age: 68
End: 2023-01-05
Attending: FAMILY MEDICINE
Payer: COMMERCIAL

## 2023-01-05 DIAGNOSIS — R07.89 CHEST TIGHTNESS: ICD-10-CM

## 2023-01-05 DIAGNOSIS — R00.2 PALPITATIONS: ICD-10-CM

## 2023-01-05 LAB — LVEF ECHO: NORMAL

## 2023-01-05 PROCEDURE — 93306 TTE W/DOPPLER COMPLETE: CPT | Performed by: INTERNAL MEDICINE

## 2023-01-06 ENCOUNTER — TRANSFERRED RECORDS (OUTPATIENT)
Dept: HEALTH INFORMATION MANAGEMENT | Facility: CLINIC | Age: 68
End: 2023-01-06

## 2023-02-17 ENCOUNTER — ANCILLARY PROCEDURE (OUTPATIENT)
Dept: MAMMOGRAPHY | Facility: CLINIC | Age: 68
End: 2023-02-17
Payer: COMMERCIAL

## 2023-02-17 DIAGNOSIS — Z12.31 ENCOUNTER FOR SCREENING MAMMOGRAM FOR BREAST CANCER: ICD-10-CM

## 2023-02-17 PROCEDURE — 77067 SCR MAMMO BI INCL CAD: CPT | Mod: TC | Performed by: RADIOLOGY

## 2023-02-17 PROCEDURE — 77063 BREAST TOMOSYNTHESIS BI: CPT | Mod: TC | Performed by: RADIOLOGY

## 2023-04-28 ENCOUNTER — TRANSFERRED RECORDS (OUTPATIENT)
Dept: FAMILY MEDICINE | Facility: CLINIC | Age: 68
End: 2023-04-28
Payer: COMMERCIAL

## 2023-05-18 ENCOUNTER — ANCILLARY PROCEDURE (OUTPATIENT)
Dept: GENERAL RADIOLOGY | Facility: CLINIC | Age: 68
End: 2023-05-18
Attending: PHYSICIAN ASSISTANT
Payer: COMMERCIAL

## 2023-05-18 ENCOUNTER — OFFICE VISIT (OUTPATIENT)
Dept: FAMILY MEDICINE | Facility: CLINIC | Age: 68
End: 2023-05-18
Payer: COMMERCIAL

## 2023-05-18 VITALS
HEIGHT: 68 IN | BODY MASS INDEX: 30.37 KG/M2 | HEART RATE: 64 BPM | TEMPERATURE: 96.1 F | RESPIRATION RATE: 14 BRPM | OXYGEN SATURATION: 97 % | WEIGHT: 200.4 LBS | SYSTOLIC BLOOD PRESSURE: 110 MMHG | DIASTOLIC BLOOD PRESSURE: 68 MMHG

## 2023-05-18 DIAGNOSIS — R07.9 CHEST PAIN, UNSPECIFIED TYPE: Primary | ICD-10-CM

## 2023-05-18 DIAGNOSIS — R07.9 CHEST PAIN, UNSPECIFIED TYPE: ICD-10-CM

## 2023-05-18 PROCEDURE — 71046 X-RAY EXAM CHEST 2 VIEWS: CPT | Mod: TC | Performed by: RADIOLOGY

## 2023-05-18 PROCEDURE — 99214 OFFICE O/P EST MOD 30 MIN: CPT | Performed by: PHYSICIAN ASSISTANT

## 2023-05-18 RX ORDER — METHOCARBAMOL 500 MG/1
250-500 TABLET, FILM COATED ORAL 3 TIMES DAILY PRN
Qty: 30 TABLET | Refills: 0 | Status: SHIPPED | OUTPATIENT
Start: 2023-05-18 | End: 2023-05-24

## 2023-05-18 ASSESSMENT — PAIN SCALES - GENERAL: PAINLEVEL: MODERATE PAIN (5)

## 2023-05-18 NOTE — PATIENT INSTRUCTIONS
Tessie Gould,    Thank you for allowing [facility] to manage your care.    I am unsure of the cause of your symptoms, but your exam is most consistent with a fractured rib.    If you develop worsening/changing symptoms at any time such as shortness of breath, worsening pain, fevers, etc., please call 911 or go to the emergency department for evaluation.    Use the spirometer you have at home and set your goal to 2400cc/ml. Use this when you think of it to avoid pneumonia.    If you have any questions or concerns, please feel free to call us at [phone]    Sincerely,    Cheo Dial PA-C    Did you know?      You can schedule a video visit for follow-up appointments as well as future appointments for certain conditions.  Please see the below link.     [email or web address]    If you have not already done so,  I encourage you to sign up for [email or web address] ([email or web address]).  This will allow you to review your results, securely communicate with a provider, and schedule virtual visits as well.

## 2023-05-18 NOTE — PROGRESS NOTES
Assessment & Plan   Problem List Items Addressed This Visit    None  Visit Diagnoses     Chest pain, unspecified type    -  Primary    Relevant Medications    methocarbamol (ROBAXIN) 500 MG tablet    Other Relevant Orders    XR Chest 2 Views (Completed)          Bryanna Estevez is a 68 year old female who presents c/o right anterior chest pain after leaning on her truck console. DDx sprain/strain/fracture/contusion/dislocation/others. CXR is negative for fracture, but I feel clinically she has a rib/cartilage fracture.Will tx with analgesics otc, RICE/heat incentive spirometer use, methocarbamol for bedtime use and follow up with her primary or myself inf not improving in the next 1-2 weeks.    DDx and Dx discussed with and explained to the pt to their satisfaction.  All questions were answered at this time. Pt expressed understanding of and agreement with this dx, tx, and plan. No further workup warranted and standard medication warnings given. I have given the patient a list of pertinent indications for re-evaluation. Will go to the Emergency Department if symptoms worsen or new concerning symptoms arise. Patient left in no apparent distress.     Ordering of each unique test  Prescription drug management     See Patient Instructions    SALLY Hector  [facility] NAFISA Gould is a 68 year old, presenting for the following health issues:  Rib Pain        5/18/2023    10:47 AM   Additional Questions   Roomed by danny denney   Accompanied by no one         5/18/2023    10:47 AM   Patient Reported Additional Medications   Patient reports taking the following new medications none     HPI     Pain History:  When did you first notice your pain? 5/16/23 was leaning over the console in her truck and felt a crack/had instant pain in the right anterior chest wall.   Have you seen anyone else for your pain? No  How has your pain affected your ability to work? Not applicable  Where in your  "body do you have pain? Musculoskeletal problem/pain  Onset/Duration: 5/16/23  Description   Location: ribs - right  Joint Swelling: YES  Redness: No  Pain: YES  Warmth: No  Intensity:  Moderate, 5/10. Worse with deep breaths.  Progression of Symptoms:  same and constant  Accompanying signs and symptoms:   Fevers: No  Numbness/tingling/weakness: No  History  Trauma to the area: No  Recent illness:  No  Previous similar problem: No  Previous evaluation:  No  Precipitating or alleviating factors:  Aggravating factors include: lifting  Therapies tried and outcome: Voltaren cream, Advil    Sob due to pain in chest.     Review of Systems   Constitutional, HEENT, cardiovascular, pulmonary, gi and gu systems are negative, except as otherwise noted.      Objective    /68   Pulse 64   Temp (!) 96.1  F (35.6  C) (Tympanic)   Resp 14   Ht 1.738 m (5' 8.43\")   Wt 90.9 kg (200 lb 6.4 oz)   SpO2 97%   Breastfeeding No   BMI 30.09 kg/m    Body mass index is 30.09 kg/m .  Physical Exam  Vitals and nursing note reviewed.   Constitutional:       General: She is not in acute distress.     Appearance: She is not ill-appearing or diaphoretic.   HENT:      Head: Normocephalic and atraumatic.      Mouth/Throat:      Mouth: Mucous membranes are moist.   Eyes:      Conjunctiva/sclera: Conjunctivae normal.   Cardiovascular:      Rate and Rhythm: Normal rate and regular rhythm.      Heart sounds: Normal heart sounds. No murmur heard.     No friction rub. No gallop.   Pulmonary:      Effort: Pulmonary effort is normal. No respiratory distress.      Breath sounds: Normal breath sounds. No stridor. No wheezing, rhonchi or rales.   Chest:       Abdominal:      General: Bowel sounds are normal. There is no distension.      Palpations: Abdomen is soft. There is no mass.      Tenderness: There is no abdominal tenderness. There is no guarding or rebound.      Hernia: No hernia is present.   Skin:     General: Skin is warm and dry. "   Neurological:      General: No focal deficit present.      Mental Status: She is alert. Mental status is at baseline.   Psychiatric:         Mood and Affect: Mood normal.         Behavior: Behavior normal.          Results for orders placed or performed in visit on 05/18/23   XR Chest 2 Views     Status: None    Narrative    CHEST TWO VIEWS 5/18/2023 11:21 AM     HISTORY: right lower anterior chest wall pain after leaning over truck  console. likely rib/cartilage injury; Chest pain, unspecified type    COMPARISON: None.       Impression    IMPRESSION: The cardiac silhouette and pulmonary vasculature are  within normal limits. No focal pulmonary consolidations. No pleural  effusion or pneumothorax. No definite acute osseous abnormality.    CORINNE DOE MD         SYSTEM ID:  [id number]

## 2023-05-22 ENCOUNTER — TRANSFERRED RECORDS (OUTPATIENT)
Dept: HEALTH INFORMATION MANAGEMENT | Facility: CLINIC | Age: 68
End: 2023-05-22
Payer: COMMERCIAL

## 2023-05-23 ENCOUNTER — NURSE TRIAGE (OUTPATIENT)
Dept: FAMILY MEDICINE | Facility: CLINIC | Age: 68
End: 2023-05-23
Payer: COMMERCIAL

## 2023-05-23 NOTE — TELEPHONE ENCOUNTER
Notified patient of the message below per provider.    Patient stated understanding and agreeable with the plan of care.     Kathya BROOKE BSN  Triage Nurse  [facility]: [facility]

## 2023-05-23 NOTE — TELEPHONE ENCOUNTER
Reason for Disposition    [1] High-risk adult (e.g., age > 60 years, osteoporosis, chronic steroid use) AND [2] still hurts    [1] MODERATE pain (e.g., interferes with normal activities) AND [2] pain is WORSE with breathing    Additional Information    Negative: Chest Injury from a direct blow or fall    Negative: Chest injury knife, gun shot, or other penetrating trauma    Negative: Chest injury with cut or laceration    Negative: Chest pain not from an injury    Negative: Sounds like a serious injury to the triager    Negative: SEVERE chest or rib pain (e.g., excruciating, unable to do any normal activities)     Advised to ER if she has these symptoms.  Patient stated understanding and agreeable with the plan of care.    Answer Assessment - Initial Assessment Questions  Patient saw Gio Dial PA-C, on 5/18/23, and is continuing having right sided rib area pain, however, it does go into her right side of her back and right breast area.      This has occurred for the last 2 days.  She stated that the pain in these areas is about a 2, unless she moves around to much, and then it will go up to a 4/10.    Patient denies any other symptoms.    Kathya BROOKE BSN  Triage Nurse  [facility]: [facility]  Ph: [phone]    Protocols used: CHEST INJURY - BENDING, LIFTING, OR [id number]

## 2023-05-23 NOTE — TELEPHONE ENCOUNTER
Patient saw Gio Dial PA-C, on 5/18/23, and is continuing having right sided rib area pain, however, it does go into her right side of her back and right breast area.      This has occurred for the last 2 days.  She stated that the pain in these areas is about a 2, unless she moves around to much, and then it will go up to a 4/10.    Patient denies any other symptoms.  She denies any increased shortness of breath, left sided chest pain, lightheadedness, dizziness, pain in neck or jaw, or down left arm.    Office visit notes below from, 5/18/23, with Gio Dial PA-C  Bryanna Estevez is a 68 year old female who presents c/o right anterior chest pain after leaning on her truck console. DDx sprain/strain/fracture/contusion/dislocation/others. CXR is negative for fracture, but I feel clinically she has a rib/cartilage fracture.Will tx with analgesics otc, RICE/heat incentive spirometer use, methocarbamol for bedtime use and follow up with her primary or myself inf not improving in the next 1-2 weeks.      Appointment made for tomorrow at 0840 with Gio Dial PA-C.  Is it ok to wait until then for an appointment.  Advised patient is any of above symptoms, or symptoms get worse, to ER today.      Patient stated understanding and agreeable with the plan of care.     Kathya BROOKE BSN  Triage Nurse  [facility]: [facility]

## 2023-05-23 NOTE — TELEPHONE ENCOUNTER
Ok to continue to monitor, but I am happy to re-evaluate her if she would like. UC/ER if worse. Please update.

## 2023-05-24 ENCOUNTER — TELEPHONE (OUTPATIENT)
Dept: FAMILY MEDICINE | Facility: CLINIC | Age: 68
End: 2023-05-24

## 2023-05-24 ENCOUNTER — ANCILLARY PROCEDURE (OUTPATIENT)
Dept: GENERAL RADIOLOGY | Facility: CLINIC | Age: 68
End: 2023-05-24
Attending: PHYSICIAN ASSISTANT
Payer: COMMERCIAL

## 2023-05-24 ENCOUNTER — OFFICE VISIT (OUTPATIENT)
Dept: FAMILY MEDICINE | Facility: CLINIC | Age: 68
End: 2023-05-24
Payer: COMMERCIAL

## 2023-05-24 VITALS
HEART RATE: 70 BPM | OXYGEN SATURATION: 98 % | BODY MASS INDEX: 29.47 KG/M2 | SYSTOLIC BLOOD PRESSURE: 130 MMHG | HEIGHT: 69 IN | RESPIRATION RATE: 14 BRPM | WEIGHT: 199 LBS | TEMPERATURE: 96.1 F | DIASTOLIC BLOOD PRESSURE: 78 MMHG

## 2023-05-24 DIAGNOSIS — R07.9 CHEST PAIN, UNSPECIFIED TYPE: ICD-10-CM

## 2023-05-24 DIAGNOSIS — R07.9 CHEST PAIN, UNSPECIFIED TYPE: Primary | ICD-10-CM

## 2023-05-24 PROCEDURE — 71046 X-RAY EXAM CHEST 2 VIEWS: CPT | Mod: TC | Performed by: RADIOLOGY

## 2023-05-24 PROCEDURE — 99214 OFFICE O/P EST MOD 30 MIN: CPT | Performed by: PHYSICIAN ASSISTANT

## 2023-05-24 RX ORDER — LIDOCAINE 4 G/G
1 PATCH TOPICAL DAILY PRN
Qty: 15 PATCH | Refills: 0 | Status: SHIPPED | OUTPATIENT
Start: 2023-05-24 | End: 2023-05-24

## 2023-05-24 RX ORDER — LIDOCAINE 50 MG/G
1 PATCH TOPICAL DAILY PRN
Qty: 15 PATCH | Refills: 0 | Status: SHIPPED | OUTPATIENT
Start: 2023-05-24 | End: 2023-11-27

## 2023-05-24 ASSESSMENT — PAIN SCALES - GENERAL: PAINLEVEL: MODERATE PAIN (4)

## 2023-05-24 NOTE — PROGRESS NOTES
Assessment & Plan   Problem List Items Addressed This Visit    None  Visit Diagnoses     Chest pain, unspecified type    -  Primary    Relevant Medications    Lidocaine (LIDOCARE) 4 % Patch    tiZANidine (ZANAFLEX) 4 MG tablet    Other Relevant Orders    XR Chest 2 Views (Completed)             Bryanna Estevez is a 68 year old female who presents c/o ongoing and worsened right anterior chest pain after leaning on her truck console 8 days ago. DDx sprain/strain/fracture/contusion/dislocation/others. CXR is negative for fracture or pneumothorax per my read, but I feel clinically she has a rib/cartilage fracture. Will tx with analgesics otc, RICE/heat incentive spirometer use, tizanidine for bedtime use and follow up with her primary or myself inf not improving in the next 1-2 weeks.     DDx and Dx discussed with and explained to the pt to their satisfaction.  All questions were answered at this time. Pt expressed understanding of and agreement with this dx, tx, and plan. No further workup warranted and standard medication warnings given. I have given the patient a list of pertinent indications for re-evaluation. Will go to the Emergency Department if symptoms worsen or new concerning symptoms arise. Patient left in no apparent distress.     Ordering of each unique test  Prescription drug management     See Patient Instructions    SALLY Hector  [facility] NAFISA Gould is a 68 year old, presenting for the following health issues:  RECHECK        5/24/2023     8:44 AM   Additional Questions   Roomed by олег, danny   Accompanied by no one         5/24/2023     8:44 AM   Patient Reported Additional Medications   Patient reports taking the following new medications none     HPI     F/U for rib pain. One week ago developed pain after leaning over her vehicle's console. Got worse. Hard time sleeping, taking a deep breath. Has to do yawing in stages. Laying down is terrible, bending over  "hurts. Pain has went up into the right breast.  Worse with bending over and deep breaths. Discomfort all of the time with severe pain at times. No fevers/chills, cough, sob    Review of Systems   Constitutional, HEENT, cardiovascular, pulmonary, gi and gu systems are negative, except as otherwise noted.      Objective    /78   Pulse 70   Temp (!) 96.1  F (35.6  C) (Tympanic)   Resp 14   Ht 1.745 m (5' 8.7\")   Wt 90.3 kg (199 lb)   SpO2 98%   Breastfeeding No   BMI 29.64 kg/m    Body mass index is 29.64 kg/m .  Physical Exam  Vitals and nursing note reviewed.   Constitutional:       General: She is not in acute distress.     Appearance: She is not ill-appearing or diaphoretic.   HENT:      Head: Normocephalic and atraumatic.      Mouth/Throat:      Mouth: Mucous membranes are moist.   Eyes:      Conjunctiva/sclera: Conjunctivae normal.   Cardiovascular:      Rate and Rhythm: Normal rate and regular rhythm.      Heart sounds: Normal heart sounds. No murmur heard.     No friction rub. No gallop.   Pulmonary:      Effort: Pulmonary effort is normal. No respiratory distress.      Breath sounds: Normal breath sounds. No stridor. No wheezing, rhonchi or rales.   Chest:      Chest wall: Tenderness (point tenderness to inferior ribs at midclavicular line on right. no overlying signs of infeciton/trauma) present.   Abdominal:      General: Bowel sounds are normal. There is no distension.      Palpations: Abdomen is soft. There is no mass.      Tenderness: There is no abdominal tenderness. There is no guarding or rebound.      Hernia: No hernia is present.   Skin:     General: Skin is warm and dry.   Neurological:      General: No focal deficit present.      Mental Status: She is alert. Mental status is at baseline.   Psychiatric:         Mood and Affect: Mood normal.         Behavior: Behavior normal.              Results for orders placed or performed in visit on 05/24/23   XR Chest 2 Views     Status: None    " Narrative    XR CHEST 2 VIEWS 5/24/2023 10:01 AM    HISTORY: Chest pain, unspecified type    COMPARISON: 5/18/2023      Impression    IMPRESSION: Heart is normal in size. Small to moderate hiatal hernia.  Lungs are clear.    ZOIE FRIEND MD         SYSTEM ID:  [id number]

## 2023-05-24 NOTE — PATIENT INSTRUCTIONS
Tessie Gould,    Thank you for allowing [facility] to manage your care.    If you develop worsening/changing symptoms at any time, please call 911 or go to the emergency department for evaluation.    I ordered some xrays. Please go to our radiology department to get your xrays.    I sent your prescriptions to your pharmacy.    For your pain, please use Tylenol 650mg every 6 hours. You may use 400mg of ibuprofen between doses of Tylenol.     Max acetaminophen (Tylenol) 3,000mg/24 hours  Max ibuprofen 2,000mg/24 hours    For severe pain not controlled by over the counter medications, please use tizanidine as prescribed. Do not use this medication while driving, operating machinery, with other sedating medications, or while drinking alcohol as it will make you drowsy.    Please allow 1-2 business days for our office to contact you in regards to your laboratory/radiological studies.  If not done so, I encourage you to login into [employer] ([email or web address] to review your results as well.     If you have any questions or concerns, please feel free to call us at [phone]    Sincerely,    Cheo Dial PA-C    Did you know?      You can schedule a video visit for follow-up appointments as well as future appointments for certain conditions.  Please see the below link.     [email or web address]    If you have not already done so,  I encourage you to sign up for [employer] ([email or web address]).  This will allow you to review your results, securely communicate with a provider, and schedule virtual visits as well.

## 2023-05-24 NOTE — TELEPHONE ENCOUNTER
Lidocaine patch 4% not covered by plan. Patient states the 5% Lidocaine patches were supposed to be sent, not the 4%. Please verify or send new script. Thank you!

## 2023-05-26 ENCOUNTER — TELEPHONE (OUTPATIENT)
Dept: FAMILY MEDICINE | Facility: CLINIC | Age: 68
End: 2023-05-26
Payer: COMMERCIAL

## 2023-05-26 NOTE — TELEPHONE ENCOUNTER
PRIOR AUTHORIZATION DENIED    Medication: LIDOCAINE 5 % EX PTCH  Insurance Company: [employer] [place] - Phone [phone] Fax [phone]  Denial Date: 5/26/2023  Denial Rational:     Appeal Information:    Patient Notified: No

## 2023-07-21 ENCOUNTER — TRANSFERRED RECORDS (OUTPATIENT)
Dept: HEALTH INFORMATION MANAGEMENT | Facility: CLINIC | Age: 68
End: 2023-07-21
Payer: COMMERCIAL

## 2023-08-03 ENCOUNTER — TRANSFERRED RECORDS (OUTPATIENT)
Dept: HEALTH INFORMATION MANAGEMENT | Facility: CLINIC | Age: 68
End: 2023-08-03
Payer: COMMERCIAL

## 2023-09-25 ENCOUNTER — TRANSFERRED RECORDS (OUTPATIENT)
Dept: HEALTH INFORMATION MANAGEMENT | Facility: CLINIC | Age: 68
End: 2023-09-25
Payer: COMMERCIAL

## 2023-11-19 NOTE — PROGRESS NOTES
Chief Complaint:   Chief Complaint   Patient presents with    Right Wrist - Pain     Right wrist pain and instability since March 2023. constant burning pain and is getting worse. She had fiona. injection and helped for about 2 weeks. She describes the pain, sharp,dull aching pain. Brace helps when she puts it on but after few minutes she starts having burning pain. Pian is better when holding up into her chest. She was seen at [facility] orth[facility] and had MRI done. She was diagnosed with right wrist TFCC     Left Wrist - Pain     Left wrist pain   weak and can't  anything heavy.         HPI: Bryanna Estevez is a 68 year old female , right -hand dominant, who presents for evaluation and management of a bilateral wrist pain, no known injury.  Right wrist onset 3/2023, left wrist more recently she thinks from overuse favoring the right side.     Right wrist with Constant burning pain, getting worse. Otherwise pain is sharp, dull and aching. She locates pain along the ulnar aspect of the wrist, aggravated with activities, writing, bending.. Tender to touch.. She's been using a brace, which helps, but after a while she'll develop burning pain. Pain is better with holding her hand up against her chest. Will get tingling only in the small figner with writing.    Left wrist is weak, can't  anything heavy. Left wrist pain is more base of thumb. No numbness and tingling left hand.    She's been followed by [facility], Dr Ortiz. Has cervical spondylosis. Has EMG through [facility] 7/7/2023 showing mild right carpal tunnel syndrome as well.    She's also been diagnosed right right elbow lateral epicondylitis, bilateral thumb carpometacarpal osteoarthritis.    She had a right wrist TFCC injection 8/2023, helped for a couple of weeks.    She's used Voltaren, biofreeze, tylenol, advil.      She reports having mild pain/discomfort around the wrist site. She denies associated numbness  "or tingling. She denies any other injuries to her upper extremity.   Symptoms: severe pain.  Location of symptoms: ulnar right wrist..  Pain severity: 3/10 constant, increased with pressure  Pain quality: dull, aching, sharp, stabbing, and burning  Frequency of symptoms: are constant  Aggravating factors: \"everything\" pressure.  Relieving factors: holding hand up against chest..    Previous treatment: brace, TFCC injection, topicals, over the counter medications.    Past medical history:  has a past medical history of Acid reflux, Acne, Insomnia, and Rosacea.   Patient Active Problem List    Diagnosis Date Noted    Palpitations 02/18/2021     Priority: Medium    Dysphagia, unspecified type 02/18/2021     Priority: Medium    Hiatal hernia 02/18/2021     Priority: Medium    Tear of meniscus of right knee as current injury, unspecified meniscus, unspecified tear type, subsequent encounter 08/24/2018     Priority: Medium    History of Clostridium difficile infection 11/12/2014     Priority: Medium    Hyperlipidemia with target LDL less than 130 09/09/2014     Priority: Medium     Diagnosis updated by automated process. Provider to review and confirm.      GERD (gastroesophageal reflux disease) 05/24/2011     Priority: Medium    Advanced directives, counseling/discussion 05/16/2011     Priority: Medium     Advance Directive Problem List Overview:   Name Relationship Phone    Primary Health Care Agent            Alternative Health Care Agent          Discussed advance care planning with patient; information given to patient to review. 5/16/2011         BMI 31.0-31.9,adult 05/16/2011     Priority: Medium    CARDIOVASCULAR SCREENING; LDL GOAL LESS THAN 160 10/31/2010     Priority: Medium       Past surgical history:  has a past surgical history that includes arthroscopy knee rt/lt; REPAIR ACHILLES TENDON,PRIMARY; tubal ligation; CREATE NEW TUBAL OPENING; and BIOPSY OF STOMACH,BY LAPAROTOMY.     Medications:    Current " Outpatient Medications   Medication Sig Dispense Refill    BIOTIN FORTE PO Take by mouth daily      lidocaine (LIDODERM) 5 % patch Place 1 patch onto the skin daily as needed for moderate pain To prevent lidocaine toxicity, patient should be patch free for 12 hrs daily. 15 patch 0    metroNIDAZOLE (METROCREAM) 0.75 % external cream Apply topically 2 times daily PRN 45 g 4    omeprazole (PRILOSEC) 20 MG DR capsule Take 1 capsule (20 mg) by mouth daily 90 capsule 3    tiZANidine (ZANAFLEX) 4 MG tablet Take 1 tablet (4 mg) by mouth 3 times daily as needed for muscle spasms 20 tablet 0        Allergies:     Allergies   Allergen Reactions    Liquid Adhesive Rash       Use PAPER tape only    Penicillins Anaphylaxis    Amitriptyline      Other reaction(s): *Unknown    Dexamethasone Sodium Phosphate Other (See Comments)     Nausea, severe burning in the perineum    Elavil [Amitriptyline Hcl]     Zolpidem Other (See Comments)        Family History: family history includes Cancer in her daughter; Heart Disease in her father; Hypertension in her father.     Social History: retired.  reports that she has never smoked. She has never used smokeless tobacco. She reports current alcohol use. She reports that she does not use drugs.    Review of Systems:  ROS: 10 point ROS neg other than the symptoms noted above in the HPI and past medical history.    Physical Exam  GENERAL APPEARANCE: healthy, alert, no distress.   SKIN: no suspicious lesions or rashes  NEURO: Normal strength and tone, mentation intact and speech normal  PSYCH:  mentation appears normal and affect normal. Not anxious.  RESPIRATORY: No increased work of breathing.    /69   Pulse 59   Resp 16   Wt 90.7 kg (200 lb)   SpO2 97%   BMI 29.79 kg/m       right HAND / WRIST EXAM:    Skin intact. No abnormal skin discoloration, erythema or ecchymosis.   Normal wear pattern, color and tone.  No observable or palpable masses of the fingers or palm or wrist.  No  palpable triggering of fingers.   No observable or palpable cords or nodules of the fingers or palm.    There is subtle swelling in the ulnar / volar ulnar aspect of the wrist.  There is severe tenderness to light touch, hypersensitivity, ulnar and volar-ulnar wrist, less so dorsoulnar wrist; she actually withdraws at any further assessment of the wrist.  There is moderate tender to palpation base of the thumb, carpometacarpal joint of the thumb.  There is no ecchymosis.  There is no erythema of the surrounding skin.  There is no maceration of the skin.  There is no gross deformity in the area.  Intact extensors. No extensor lag.    Unable to further assess wrist for carpal tunnel syndrome signs.    1st carpometacarpal grind: mild positive , but after test, reports severe pain in the area now as well.    Intact sensation light touch median, radial, ulnar nerves of the hand  Intact sensation to the radial and ulnar digital nerves of the fingers, as well as the finger tips.  Intact epl fpl fdp edc wrist flexion/extension biceps/triceps deltoid  Brisk capillary refill to all fingers.   Palpable radial pulse, 2+.    LEFT HAND / WRIST EXAM:    Skin intact. No abnormal skin discoloration, erythema or ecchymosis.   Normal wear pattern, color and tone.  No observable or palpable masses of the fingers or palm or wrist.  No palpable triggering of fingers.   No observable or palpable cords or nodules of the fingers or palm.    There is no swelling.  There is tender to palpation over the base of the thumb, carpometacarpal joint.  There is no ecchymosis.  There is no erythema of the surrounding skin.  There is no maceration of the skin.  There is no gross deformity in the area.  Intact extensors. No extensor lag.    Negative Tinels at the wrist  Negative median nerve compression test    1st carpometacarpal grind: mild positive.    Intact sensation light touch median, radial, ulnar nerves of the hand  Intact sensation to the  radial and ulnar digital nerves of the fingers, as well as the finger tips.  Intact epl fpl fdp edc wrist flexion/extension biceps/triceps deltoid  Brisk capillary refill to all fingers.   Palpable radial pulse, 2+.      X-rays:  3 views bilateral wrist from 11/22/2023 were reviewed in clinic today. On my review,  Mild-moderate  degenerative arthritis of the first CMC and moderate degenerative arthritis of the STT joints bilaterally. Mild joint space narrowing of the second metacarpophalangeal joint on the right. No discrete osseous erosions, periarticular osteopenia, or soft tissue thickening. No definite fracture.      MRI right wrist 10/17/2023, [facility], report only.  No acute fracture.  Diffuse wrist degenerative changes involving multiple joints (STT, thumb carpometacarpal joints) ; scattered subchondral cysts and edema-like marrow signal, most pronounced involving the radiocarpal joint and the pisotriquetral joint.    No TFCC tear. Intact tendons, ligaments.        Assessment: 67yo RHD female with:  1) chronic right wrist pain, CRPS  2) bilateral thumb carpometacarpal osteoarthritis.  3) asymptomatic right carpal tunnel syndrome..    Plan:  Exam, images reviewed  Suspect left wrist pain is aggravation of underlying carpometacarpal osteoarthritis at the thumb, from increased activities due to favoring the right side  MRI findings reviewed, nothing other than osteoarthritis, like the xrays  My concern is more of a nerve pain along the outer aspect of the wrist in the ulnar nerve distribution, concerning for RSD or CRPS  Not normal to have such hypersensitivity to even light touch, almost crippling pain.  At this time, recommend pain managmeent referral, hand therapy referral  I will message her primary care provider about starting gabapentin or neurontin for the pain as well  Return to clinic as needed. This is not surgical.    * All questions were addressed and answered prior to discharge from  clinic today. The patient acknowledges an understanding of and agreement with the plan set forth during today's visit. Patient was advised to call our office or MyChart us if any further questions arise upon leaving our office today.          Charles Acosta M.D., M.S.  Dept. of Orthopaedic Surgery  [facility]

## 2023-11-22 ENCOUNTER — ANCILLARY PROCEDURE (OUTPATIENT)
Dept: GENERAL RADIOLOGY | Facility: CLINIC | Age: 68
End: 2023-11-22
Attending: ORTHOPAEDIC SURGERY
Payer: COMMERCIAL

## 2023-11-22 ENCOUNTER — OFFICE VISIT (OUTPATIENT)
Dept: ORTHOPEDICS | Facility: CLINIC | Age: 68
End: 2023-11-22
Payer: COMMERCIAL

## 2023-11-22 VITALS
SYSTOLIC BLOOD PRESSURE: 122 MMHG | WEIGHT: 200 LBS | OXYGEN SATURATION: 97 % | RESPIRATION RATE: 16 BRPM | DIASTOLIC BLOOD PRESSURE: 69 MMHG | BODY MASS INDEX: 29.79 KG/M2 | HEART RATE: 59 BPM

## 2023-11-22 DIAGNOSIS — M25.532 PAIN IN BOTH WRISTS: Primary | ICD-10-CM

## 2023-11-22 DIAGNOSIS — M18.0 PRIMARY OSTEOARTHRITIS OF BOTH FIRST CARPOMETACARPAL JOINTS: ICD-10-CM

## 2023-11-22 DIAGNOSIS — M25.531 PAIN IN BOTH WRISTS: Primary | ICD-10-CM

## 2023-11-22 DIAGNOSIS — G90.511 COMPLEX REGIONAL PAIN SYNDROME TYPE 1 OF RIGHT UPPER EXTREMITY: ICD-10-CM

## 2023-11-22 PROCEDURE — 99203 OFFICE O/P NEW LOW 30 MIN: CPT | Performed by: ORTHOPAEDIC SURGERY

## 2023-11-22 PROCEDURE — 73110 X-RAY EXAM OF WRIST: CPT | Mod: TC | Performed by: RADIOLOGY

## 2023-11-22 ASSESSMENT — PAIN SCALES - GENERAL: PAINLEVEL: MILD PAIN (3)

## 2023-11-22 NOTE — Clinical Note
I'm concerned with Bryanna developing RSD / CRPS of the right wrist based on her exam. Very hypersensitive over the ulnar aspect of the wrist. I'm referring to pain management, hand therapy. I advised her to discuss with you use of a nerve pain medication such as gabapentin or lyrica. She might benefit from that. Those are medications that I don't typically prescribe.  Thanks. -DRISS

## 2023-11-22 NOTE — PROGRESS NOTES
Left message for patient to return call to clinic.  Okay to use BALDEV opening with PCP.  Mili DURHAM    [facility]

## 2023-11-22 NOTE — LETTER
11/22/2023         RE: Bryanna Estevez  [address] [facility] [address]        Dear Colleague,    Thank you for referring your patient, Bryanna Estevez, to the [facility] [place]. Please see a copy of my visit note below.    Chief Complaint:   Chief Complaint   Patient presents with     Right Wrist - Pain     Right wrist pain and instability since March 2023. constant burning pain and is getting worse. She had fiona. injection and helped for about 2 weeks. She describes the pain, sharp,dull aching pain. Brace helps when she puts it on but after few minutes she starts having burning pain. Pian is better when holding up into her chest. She was seen at [facility] orthopedics and had MRI done. She was diagnosed with right wrist TFCC      Left Wrist - Pain     Left wrist pain   weak and can't  anything heavy.         HPI: Bryanna Estevez is a 68 year old female , right -hand dominant, who presents for evaluation and management of a bilateral wrist pain, no known injury.  Right wrist onset 3/2023, left wrist more recently she thinks from overuse favoring the right side.     Right wrist with Constant burning pain, getting worse. Otherwise pain is sharp, dull and aching. She locates pain along the ulnar aspect of the wrist, aggravated with activities, writing, bending.. Tender to touch.. She's been using a brace, which helps, but after a while she'll develop burning pain. Pain is better with holding her hand up against her chest. Will get tingling only in the small figner with writing.    Left wrist is weak, can't  anything heavy. Left wrist pain is more base of thumb. No numbness and tingling left hand.    She's been followed by [facility], Dr Ortiz. Has cervical spondylosis. Has EMG through [facility] 7/7/2023 showing mild right carpal tunnel syndrome as well.    She's also been diagnosed right right elbow lateral epicondylitis, bilateral  "thumb carpometacarpal osteoarthritis.    She had a right wrist TFCC injection 8/2023, helped for a couple of weeks.    She's used Voltaren, biofreeze, tylenol, advil.      She reports having mild pain/discomfort around the wrist site. She denies associated numbness or tingling. She denies any other injuries to her upper extremity.   Symptoms: severe pain.  Location of symptoms: ulnar right wrist..  Pain severity: 3/10 constant, increased with pressure  Pain quality: dull, aching, sharp, stabbing, and burning  Frequency of symptoms: are constant  Aggravating factors: \"everything\" pressure.  Relieving factors: holding hand up against chest..    Previous treatment: brace, TFCC injection, topicals, over the counter medications.    Past medical history:  has a past medical history of Acid reflux, Acne, Insomnia, and Rosacea.   Patient Active Problem List    Diagnosis Date Noted     Palpitations 02/18/2021     Priority: Medium     Dysphagia, unspecified type 02/18/2021     Priority: Medium     Hiatal hernia 02/18/2021     Priority: Medium     Tear of meniscus of right knee as current injury, unspecified meniscus, unspecified tear type, subsequent encounter 08/24/2018     Priority: Medium     History of Clostridium difficile infection 11/12/2014     Priority: Medium     Hyperlipidemia with target LDL less than 130 09/09/2014     Priority: Medium     Diagnosis updated by automated process. Provider to review and confirm.       GERD (gastroesophageal reflux disease) 05/24/2011     Priority: Medium     Advanced directives, counseling/discussion 05/16/2011     Priority: Medium     Advance Directive Problem List Overview:   Name Relationship Phone    Primary Health Care Agent            Alternative Health Care Agent          Discussed advance care planning with patient; information given to patient to review. 5/16/2011          BMI 31.0-31.9,adult 05/16/2011     Priority: Medium     CARDIOVASCULAR SCREENING; LDL GOAL LESS THAN " 160 10/31/2010     Priority: Medium       Past surgical history:  has a past surgical history that includes arthroscopy knee rt/lt; REPAIR ACHILLES TENDON,PRIMARY; tubal ligation; CREATE NEW TUBAL OPENING; and BIOPSY OF STOMACH,BY LAPAROTOMY.     Medications:    Current Outpatient Medications   Medication Sig Dispense Refill     BIOTIN FORTE PO Take by mouth daily       lidocaine (LIDODERM) 5 % patch Place 1 patch onto the skin daily as needed for moderate pain To prevent lidocaine toxicity, patient should be patch free for 12 hrs daily. 15 patch 0     metroNIDAZOLE (METROCREAM) 0.75 % external cream Apply topically 2 times daily PRN 45 g 4     omeprazole (PRILOSEC) 20 MG DR capsule Take 1 capsule (20 mg) by mouth daily 90 capsule 3     tiZANidine (ZANAFLEX) 4 MG tablet Take 1 tablet (4 mg) by mouth 3 times daily as needed for muscle spasms 20 tablet 0        Allergies:     Allergies   Allergen Reactions     Liquid Adhesive Rash       Use PAPER tape only     Penicillins Anaphylaxis     Amitriptyline      Other reaction(s): *Unknown     Dexamethasone Sodium Phosphate Other (See Comments)     Nausea, severe burning in the perineum     Elavil [Amitriptyline Hcl]      Zolpidem Other (See Comments)        Family History: family history includes Cancer in her daughter; Heart Disease in her father; Hypertension in her father.     Social History: retired.  reports that she has never smoked. She has never used smokeless tobacco. She reports current alcohol use. She reports that she does not use drugs.    Review of Systems:  ROS: 10 point ROS neg other than the symptoms noted above in the HPI and past medical history.    Physical Exam  GENERAL APPEARANCE: healthy, alert, no distress.   SKIN: no suspicious lesions or rashes  NEURO: Normal strength and tone, mentation intact and speech normal  PSYCH:  mentation appears normal and affect normal. Not anxious.  RESPIRATORY: No increased work of breathing.    /69   Pulse 59    Resp 16   Wt 90.7 kg (200 lb)   SpO2 97%   BMI 29.79 kg/m       right HAND / WRIST EXAM:    Skin intact. No abnormal skin discoloration, erythema or ecchymosis.   Normal wear pattern, color and tone.  No observable or palpable masses of the fingers or palm or wrist.  No palpable triggering of fingers.   No observable or palpable cords or nodules of the fingers or palm.    There is subtle swelling in the ulnar / volar ulnar aspect of the wrist.  There is severe tenderness to light touch, hypersensitivity, ulnar and volar-ulnar wrist, less so dorsoulnar wrist; she actually withdraws at any further assessment of the wrist.  There is moderate tender to palpation base of the thumb, carpometacarpal joint of the thumb.  There is no ecchymosis.  There is no erythema of the surrounding skin.  There is no maceration of the skin.  There is no gross deformity in the area.  Intact extensors. No extensor lag.    Unable to further assess wrist for carpal tunnel syndrome signs.    1st carpometacarpal grind: mild positive , but after test, reports severe pain in the area now as well.    Intact sensation light touch median, radial, ulnar nerves of the hand  Intact sensation to the radial and ulnar digital nerves of the fingers, as well as the finger tips.  Intact epl fpl fdp edc wrist flexion/extension biceps/triceps deltoid  Brisk capillary refill to all fingers.   Palpable radial pulse, 2+.    LEFT HAND / WRIST EXAM:    Skin intact. No abnormal skin discoloration, erythema or ecchymosis.   Normal wear pattern, color and tone.  No observable or palpable masses of the fingers or palm or wrist.  No palpable triggering of fingers.   No observable or palpable cords or nodules of the fingers or palm.    There is no swelling.  There is tender to palpation over the base of the thumb, carpometacarpal joint.  There is no ecchymosis.  There is no erythema of the surrounding skin.  There is no maceration of the skin.  There is no gross  deformity in the area.  Intact extensors. No extensor lag.    Negative Tinels at the wrist  Negative median nerve compression test    1st carpometacarpal grind: mild positive.    Intact sensation light touch median, radial, ulnar nerves of the hand  Intact sensation to the radial and ulnar digital nerves of the fingers, as well as the finger tips.  Intact epl fpl fdp edc wrist flexion/extension biceps/triceps deltoid  Brisk capillary refill to all fingers.   Palpable radial pulse, 2+.      X-rays:  3 views bilateral wrist from 11/22/2023 were reviewed in clinic today. On my review,  Mild-moderate  degenerative arthritis of the first CMC and moderate degenerative arthritis of the STT joints bilaterally. Mild joint space narrowing of the second metacarpophalangeal joint on the right. No discrete osseous erosions, periarticular osteopenia, or soft tissue thickening. No definite fracture.      MRI right wrist 10/17/2023, [facility], report only.  No acute fracture.  Diffuse wrist degenerative changes involving multiple joints (STT, thumb carpometacarpal joints) ; scattered subchondral cysts and edema-like marrow signal, most pronounced involving the radiocarpal joint and the pisotriquetral joint.    No TFCC tear. Intact tendons, ligaments.        Assessment: 69yo RHD female with:  1) chronic right wrist pain, CRPS  2) bilateral thumb carpometacarpal osteoarthritis.  3) asymptomatic right carpal tunnel syndrome..    Plan:  Exam, images reviewed  Suspect left wrist pain is aggravation of underlying carpometacarpal osteoarthritis at the thumb, from increased activities due to favoring the right side  MRI findings reviewed, nothing other than osteoarthritis, like the xrays  My concern is more of a nerve pain along the outer aspect of the wrist in the ulnar nerve distribution, concerning for RSD or CRPS  Not normal to have such hypersensitivity to even light touch, almost crippling pain.  At this time,  recommend pain managmeent referral, hand therapy referral  I will message her primary care provider about starting gabapentin or neurontin for the pain as well  Return to clinic as needed. This is not surgical.    * All questions were addressed and answered prior to discharge from clinic today. The patient acknowledges an understanding of and agreement with the plan set forth during today's visit. Patient was advised to call our office or MyChart us if any further questions arise upon leaving our office today.          Charles Acosta M.D., M.S.  Dept. of Orthopaedic Surgery  [facility]       Left message for patient to return call to clinic.  Okay to use BALDEV opening with PCP.  Mili DURHAM    [facility]      Again, thank you for allowing me to participate in the care of your patient.        Sincerely,        Charles Acosta MD

## 2023-11-27 ENCOUNTER — PATIENT OUTREACH (OUTPATIENT)
Dept: CARE COORDINATION | Facility: CLINIC | Age: 68
End: 2023-11-27

## 2023-11-27 ENCOUNTER — OFFICE VISIT (OUTPATIENT)
Dept: FAMILY MEDICINE | Facility: CLINIC | Age: 68
End: 2023-11-27
Payer: COMMERCIAL

## 2023-11-27 VITALS
DIASTOLIC BLOOD PRESSURE: 84 MMHG | RESPIRATION RATE: 18 BRPM | HEART RATE: 60 BPM | OXYGEN SATURATION: 97 % | WEIGHT: 200 LBS | BODY MASS INDEX: 29.79 KG/M2 | SYSTOLIC BLOOD PRESSURE: 139 MMHG | TEMPERATURE: 96.4 F

## 2023-11-27 DIAGNOSIS — G90.511 COMPLEX REGIONAL PAIN SYNDROME TYPE 1 OF RIGHT UPPER EXTREMITY: ICD-10-CM

## 2023-11-27 DIAGNOSIS — E28.39 ESTROGEN DEFICIENCY: ICD-10-CM

## 2023-11-27 DIAGNOSIS — K44.9 HIATAL HERNIA: Primary | ICD-10-CM

## 2023-11-27 DIAGNOSIS — R09.89 PHLEGM IN THROAT: ICD-10-CM

## 2023-11-27 PROCEDURE — 99214 OFFICE O/P EST MOD 30 MIN: CPT | Performed by: FAMILY MEDICINE

## 2023-11-27 RX ORDER — RESPIRATORY SYNCYTIAL VIRUS VACCINE 120MCG/0.5
0.5 KIT INTRAMUSCULAR ONCE
Qty: 1 EACH | Refills: 0 | Status: CANCELLED | OUTPATIENT
Start: 2023-11-27 | End: 2023-11-27

## 2023-11-27 RX ORDER — GABAPENTIN 100 MG/1
100 CAPSULE ORAL EVERY EVENING
Qty: 30 CAPSULE | Refills: 1 | Status: SHIPPED | OUTPATIENT
Start: 2023-11-27 | End: 2023-12-11

## 2023-11-27 RX ORDER — GABAPENTIN 100 MG/1
100 CAPSULE ORAL 3 TIMES DAILY
Qty: 30 CAPSULE | Refills: 1 | Status: SHIPPED | OUTPATIENT
Start: 2023-11-27 | End: 2023-11-27

## 2023-11-27 ASSESSMENT — PAIN SCALES - GENERAL: PAINLEVEL: NO PAIN (0)

## 2023-11-27 NOTE — PROGRESS NOTES
"  Assessment & Plan     Hiatal hernia  refill  - omeprazole (PRILOSEC) 20 MG DR capsule; Take 1 capsule (20 mg) by mouth daily    Estrogen deficiency  overdue  - DEXA HIP/PELVIS/SPINE - Future; Future    Complex regional pain syndrome type 1 of right upper extremity  Trial of low dose and follow-up in one month  - gabapentin (NEURONTIN) 100 MG capsule; Take 1 capsule (100 mg) by mouth every evening    Phlegm in throat  As per note             BMI:   Estimated body mass index is 29.79 kg/m  as calculated from the following:    Height as of 5/24/23: 1.745 m (5' 8.7\").    Weight as of this encounter: 90.7 kg (200 lb).           Meghan Painter MD  [facility] AUNDREA Gould is a 68 year old, presenting for the following health issues:  Recheck Medication      11/27/2023    10:10 AM   Additional Questions   Roomed by dann       History of Present Illness       Reason for visit:  Gerd    She eats 4 or more servings of fruits and vegetables daily.She consumes 0 sweetened beverage(s) daily.She exercises with enough effort to increase her heart rate 20 to 29 minutes per day.  She exercises with enough effort to increase her heart rate 7 days per week.   She is taking medications regularly.     Needs refill of her ppi for her GERD  Does have a lot of thick phlegm in her throat. Worse at night. Does elevate HOB and tries to not eat a lot before bed. Will have her double her PPI to see if that is helpful  If not then have her do trial of otc nasal steroid  If not helpful, then follow-up with ENT    Pt with right ulnar nerve pain as per geovani  She has tried gabapentin and elavil but her GI system does not seem to tolerate them  Will do low dose of gabapentin as a retrial before bed to see if that is helpful  [facility]    Review of Systems   Constitutional, HEENT, cardiovascular, pulmonary, gi and gu systems are negative, except as otherwise noted.    "   Objective    /84   Pulse 60   Temp (!) 96.4  F (35.8  C) (Oral)   Resp 18   Wt 90.7 kg (200 lb)   SpO2 97%   BMI 29.79 kg/m    Body mass index is 29.79 kg/m .  Physical Exam   GENERAL: healthy, alert and no distress  RESP: lungs clear to auscultation - no rales, rhonchi or wheezes  CV: regular rate and rhythm, normal S1 S2, no S3 or S4, no murmur, click or rub, no peripheral edema and peripheral pulses strong    No results found for this or any previous visit (from the past 24 hour(s)).

## 2023-11-30 ENCOUNTER — THERAPY VISIT (OUTPATIENT)
Dept: OCCUPATIONAL THERAPY | Facility: CLINIC | Age: 68
End: 2023-11-30
Attending: PHYSICIAN ASSISTANT
Payer: COMMERCIAL

## 2023-11-30 DIAGNOSIS — M25.531 PAIN IN BOTH WRISTS: ICD-10-CM

## 2023-11-30 DIAGNOSIS — M25.532 PAIN IN BOTH WRISTS: ICD-10-CM

## 2023-11-30 DIAGNOSIS — M18.0 PRIMARY OSTEOARTHRITIS OF BOTH FIRST CARPOMETACARPAL JOINTS: ICD-10-CM

## 2023-11-30 DIAGNOSIS — G90.511 COMPLEX REGIONAL PAIN SYNDROME TYPE 1 OF RIGHT UPPER EXTREMITY: ICD-10-CM

## 2023-11-30 PROCEDURE — 97166 OT EVAL MOD COMPLEX 45 MIN: CPT | Mod: GO | Performed by: OCCUPATIONAL THERAPIST

## 2023-11-30 PROCEDURE — 97110 THERAPEUTIC EXERCISES: CPT | Mod: GO | Performed by: OCCUPATIONAL THERAPIST

## 2023-11-30 PROCEDURE — 97535 SELF CARE MNGMENT TRAINING: CPT | Mod: GO | Performed by: OCCUPATIONAL THERAPIST

## 2023-11-30 NOTE — PROGRESS NOTES
OCCUPATIONAL THERAPY EVALUATION  Type of Visit: Evaluation    See electronic medical record for Abuse and Falls Screening details.    Subjective      Presenting condition or subjective complaint: R and L wrist  Date of onset: 11/22/23 (therapy referral)    Relevant medical history: Arthritis; Migraines or headaches; Pain at night or rest   Dates & types of surgery: none per pt report    Prior diagnostic imaging/testing results: MRI; X-ray; EMG   x-ray and MRI  Prior therapy history for the same diagnosis, illness or injury: No  R TFCC injection 8/23 with relief x 2 weeks    Prior Level of Function  ADL: Independent    Living Environment  Social support: With a significant other or spouse   Type of home: House   Stairs to enter the home:         Ramp:     Stairs inside the home:         Help at home: None  Equipment owned: Raised toilet seat     Employment: No sewing, [employer], crafts  Hobbies/Interests:      Patient goals for therapy: everything       Objective     ADDITIONAL HISTORY:  Right hand dominant  Patient reports symptoms of pain, stiffness/loss of motion, weakness/loss of strength, and edema of the right wrist since March 2023 with no injury and left wrist more recently due to overuse  Transportation: [facility]    Functional Outcome Measure:   Functional Outcome Measure:  Upper Extremity Functional Index  SCORE:   Column Totals: 15/80  (A lower score indicates greater disability.)    Pain Level (Scale 0-10)   11/30/2023   At Rest 5 right  0 left   With Use 10 right  5-6 left     Pain Description  Date 11/30/2023   Location Right ulnar wrist  Left radial wrist and th CMC   Pain Quality Burning, throbbing and sharp on right  Aching on left   Frequency constant  right  intermittent on left   Pain is worst  Same day and night   Exacerbated by Resting on right ulnar wrist and any use  Left with use   Relieved by NSAID's, tylenol, avoid pressure to ulnar wrist   Progression Gradually worsening     ROM  Thumb  11/30/2023   AROM  (PROM) Right   RABD 35+   PABD 40-   Retropulsion (cm) NT   Kapandji Opposition Scale (0-10/10) 10/10     Thumb 11/30/2023   AROM  (PROM) Left   RABD 35+ slight   PABD 35-   Retropulsion (cm) 6 cm-   Kapandji Opposition Scale (0-10/10) 10/10     Wrist 11/30/2023 11/30/2023   AROM (PROM) Left Right   Extension 70- 40+   Flexion 70+ 10++   RD 20+ 20-   UD 30+ 20+   Supination 85+ slight 60+++   Pronation 90- 90-     Observation/Appearance   - none  + mild    ++ moderate    +++ severe     11/30/2023   Thumb shoulder deformity present over CMC R: +  L: +   Edema over the thumb CMC joint R: -  L: -   Noted collapse of thumb MP into hyperextension during pinch R: + slight  L: + slight   Ulnar wrist swelling R:+ slight  L:-      Provocative Tests  Pain Report:  - none    + mild    ++ moderate    +++ severe     11/30/2023   CMC Grind test R: -  L: -   Crepitus present R: -  L: -   CMC Adduction Stress Test R: -  L: -   CMC Extension Stress Test R: -  L: + slight   Finkelstein's R: NT  L: -       Strength   (Measured in pounds)  Pain Report: - none  + mild    ++ moderate    +++ severe    11/30/2023 11/30/2023   Trials Right Left   1  2  3 12-  18-  14- 18-  28-  30-   Average 14 25     Lat Pinch 11/30/2023 11/30/2023   Trials Right Left   1  2  3 4+ slight  4+ slight  4+ slight 5-  5-  6-   Average 4 5     3 Pt Pinch 11/30/2023 11/30/2023   Trials Right Left   1  2  3 3+  4+  3+ 5-  7-  7+ slight   Average 3 6     Palpation   Pain Report:  - none    + mild    ++ moderate    +++ severe  Note: unable to tolerate palpation to ulnar/volar wrist, extremely hypersensitive   11/30/2023   CMC Joint Line R: -  L: + slight   [place] R: -  L: -   Web Space R: -  L: +   1st DC R: -  L: +   Radial Styloid R: -  L: +   FCR R: -  L: ++   FCU R: ++  L: -   ECU R: +  L: -   TFCC R: +++  L: -   LEP R: -  L: -   Extensor Wad R: -  L: + slight     Assessment & Plan   CLINICAL IMPRESSIONS  Medical Diagnosis: B  wrist pain, B th CMC arthritis, R UE CPRS    Treatment Diagnosis: B wrist pain R > L, right ulnar wrist and left radial wrist/th CMC    Impression/Assessment: Pt is a 68 year old female presenting to Occupational Therapy due to bilateral wrist pain R > L.  Patient's limitations or Problem List includes: Pain, Decreased ROM/motion, Increased edema, Weakness, Hypermobility, Decreased , Decreased pinch, and Tightness in musculature of the bilateral wrist and thumb which interferes with the patient's ability to perform Self Care Tasks (dressing, eating, bathing), Work Tasks, Sleep Patterns, Recreational Activities, Household Chores, and Driving  as compared to previous level of function.    Clinical Decision Making (Complexity):  Assessment of Occupational Performance: 5 or more Performance Deficits  Occupational Performance Limitations: bathing/showering, dressing, hygiene and grooming, driving and community mobility, home establishment and management, meal preparation and cleanup, sleep, work, and leisure activities  Clinical Decision Making (Complexity): Moderate complexity    PLAN OF CARE  Treatment Interventions:  Modalities:  US and Paraffin  Therapeutic Exercise:  AROM, AAROM, PROM, Tendon Gliding, Place and Hold, Isotonics, Isometrics, and Stabilization  Neuromuscular re-education:  Posture and Kinesiotaping  Manual Techniques:  Joint mobilization and Myofascial release  Orthotic Fabrication:  Static Hand and Forearm based  Self Care:  Self Care Tasks, Ergonomic Considerations, Joint Protection and Adaptive Equipment Education     Long Term Goals   OT Goal 1  Goal Identifier: gripping/cutting  Goal Description: Pt will increase ROM and strength and decrease pain to be able to  knife and cut food with mild difficulty, pain 3/10 or less  Rationale: In order to maximize safety and independence with performance of self-care activities (self)  Goal Progress: pain 10/10 R wrist  Target Date:  02/27/24      Frequency of Treatment: 2 x per month  Duration of Treatment: 3 months     Recommended Referrals to Other Professionals:  NA  Education Assessment: Learner/Method: Patient;Demonstration;Pictures/Video  Education Comments: printed PTRx, unable to save to phone     Risks and benefits of evaluation/treatment have been explained.   Patient/Family/caregiver agrees with Plan of Care.     Evaluation Time:    OT Eval, Moderate Complexity Minutes (17323): 20   Present: Not applicable     Signing Clinician: EDUAR Pang [facility]                                                                                   OUTPATIENT OCCUPATIONAL THERAPY      PLAN OF TREATMENT FOR OUTPATIENT REHABILITATION   Patient's Last Name, First Name, Bryanna Lyman YOB: 1955   Provider's Name   [facility]   Medical Record No.  [id number]     Onset Date: 11/22/23 (therapy referral) Start of Care Date: 11/30/23     Medical Diagnosis:  B wrist pain, B th CMC arthritis, R UE CPRS      OT Treatment Diagnosis:  B wrist pain R > L, right ulnar wrist and left radial wrist/th CMC Plan of Treatment  Frequency/Duration:2 x per month/3 months    Certification date from 11/30/23   To 02/27/24        See note for plan of treatment details and functional goals     Jaja Dennison OT                         I CERTIFY THE NEED FOR THESE SERVICES FURNISHED UNDER        THIS PLAN OF TREATMENT AND WHILE UNDER MY CARE     (Physician attestation of this document indicates review and certification of the therapy plan).              Referring Provider:  Delroy Humphrey    Initial Assessment  See Epic Evaluation- 11/30/23

## 2023-12-11 ENCOUNTER — TELEPHONE (OUTPATIENT)
Dept: FAMILY MEDICINE | Facility: CLINIC | Age: 68
End: 2023-12-11
Payer: COMMERCIAL

## 2023-12-11 DIAGNOSIS — G90.511 COMPLEX REGIONAL PAIN SYNDROME TYPE 1 OF RIGHT UPPER EXTREMITY: ICD-10-CM

## 2023-12-11 RX ORDER — GABAPENTIN 100 MG/1
200 CAPSULE ORAL EVERY EVENING
Qty: 60 CAPSULE | Refills: 1 | Status: SHIPPED | OUTPATIENT
Start: 2023-12-11 | End: 2023-12-27

## 2023-12-11 NOTE — TELEPHONE ENCOUNTER
Left message on answering machine for patient to call back to [phone].    RN please give patient provider message as written.  Dr. Garrido has already sent a new prescription.  Mady ELENAN, RN

## 2023-12-11 NOTE — TELEPHONE ENCOUNTER
1) gabapentin is usually more sedating for patients then causing restlessness. Have her increase the dose. If her insomnia/restlessness is worse, then she can just stop the medication    2) as with all medications used to treat nerve pain, it often takes several weeks to take effect.     Meghan Painter MD

## 2023-12-11 NOTE — TELEPHONE ENCOUNTER
Patient returned call to clinic.  Relayed provider instructions to increase Gabapentin to 200 mg, if agreeable,  and new prescription was given.    Per patient, she agreed but is asking however, if the increased Gabapentin will continue to contribute to her insomnia and restlessness at night?  Notes she read some of the SE of Gabapentin and noted that restlessness was on the list.  Patient noting she has not been able to fall asleep since starting on the medication, is averaging about 3 to 3 1/2 hours of sleep per night. Can't fall asleep till about 3-4 AM. Just lays there in bed, gets up and walks around house.     1) Patient is asking that if with the dose increase, will she become more restless at night?    2) what is the time frame till the increased Gabapentin would be working? Right away? A week? Patient noted she doesn't want to wait 2 weeks or more to find out if the increased dose is working, especially if will be affecting sleep.    3) Patient notes that with her current Gabapentin dose (100 mg), her nerve pain in her right wrist has not changed or improved at all. Pain is still the same as 11/27/23 office visit.      Routing to provider to advise.      Myriam Hall RN  Clinical Triage/Primary Care  [facility]

## 2023-12-11 NOTE — TELEPHONE ENCOUNTER
Medication Question or Refill    Contacts         Type Contact Phone/Fax    12/11/2023 09:15 AM CST Phone (Incoming) Bryanna Estevez (Self) [phone] (M)            What medication are you calling about (include dose and sig)?: Gabapentin    Preferred Pharmacy:  [id number] - [place], [place] - [address] [address]  [address] [address]  [place] [address]  Phone: [phone] Fax: [phone]      Controlled Substance Agreement on file:   CSA -- Patient Level:    CSA: None found at the patient level.       Who prescribed the medication?: Dr Painter    Do you need a refill? No    When did you use the medication last?     Patient offered an appointment? No    Do you have any questions or concerns?  Yes: Patient is not sleeping and the gabapentin is not working. Patient is up until 4-5 AM.      Could we send this information to you in [facility] or would you prefer to receive a phone call?:   Patient would prefer a phone call   Okay to leave a detailed message?: Yes at Cell number on file:    Telephone Information:   Mobile [phone]     Bridget HORAN [facility]

## 2023-12-11 NOTE — TELEPHONE ENCOUNTER
If she is agreeable, have her double her dose to 200 mg. I will send in a prescription for this    Meghan Painter MD

## 2023-12-11 NOTE — TELEPHONE ENCOUNTER
Pt notified of provider message as written.  Pt verbalized good understanding.  Mady Myers BSN, RN

## 2023-12-14 ENCOUNTER — THERAPY VISIT (OUTPATIENT)
Dept: OCCUPATIONAL THERAPY | Facility: CLINIC | Age: 68
End: 2023-12-14
Payer: COMMERCIAL

## 2023-12-14 DIAGNOSIS — M18.0 PRIMARY OSTEOARTHRITIS OF BOTH FIRST CARPOMETACARPAL JOINTS: ICD-10-CM

## 2023-12-14 DIAGNOSIS — G90.511 COMPLEX REGIONAL PAIN SYNDROME TYPE 1 OF RIGHT UPPER EXTREMITY: ICD-10-CM

## 2023-12-14 DIAGNOSIS — M25.532 PAIN IN BOTH WRISTS: Primary | ICD-10-CM

## 2023-12-14 DIAGNOSIS — M25.531 PAIN IN BOTH WRISTS: Primary | ICD-10-CM

## 2023-12-14 PROCEDURE — 97140 MANUAL THERAPY 1/> REGIONS: CPT | Mod: GO | Performed by: OCCUPATIONAL THERAPIST

## 2023-12-14 PROCEDURE — 97110 THERAPEUTIC EXERCISES: CPT | Mod: GO | Performed by: OCCUPATIONAL THERAPIST

## 2023-12-27 RX ORDER — GABAPENTIN 100 MG/1
200 CAPSULE ORAL EVERY EVENING
Qty: 180 CAPSULE | Refills: 1 | Status: SHIPPED | OUTPATIENT
Start: 2023-12-27 | End: 2024-01-16

## 2023-12-27 NOTE — TELEPHONE ENCOUNTER
General Call    Contacts         Type Contact Phone/Fax    12/11/2023 09:15 AM CST Phone (Incoming) KevancarlosBrian purvistrino BIRCH (Self) [phone] (M)    12/11/2023 02:16 PM CST Phone (Incoming) Kevancarloshyun Bryanna BIRCH (Self) [phone] (M)    12/27/2023 04:18 PM CST Phone (Incoming) Zenaida Bryanna BIR[facility] (Self) [phone] (M)          Reason for Call:  Medication issue    What are your questions or concerns:  Patient is calling in again regarding Gabapentin.   She states that she is still not sleeping, getting about 2 1/2 hours of sleep a night, and still having the wrist pain.   Bryanna is wondering what her next steps should be?  Should she stop taking this medication?  Please advise      Date of last appointment with provider: 11/27/23    Could we send this information to you in [facility] or would you prefer to receive a phone call?:   Patient would prefer a phone call   Okay to leave a detailed message?: Yes at Cell number on file:    Telephone Information:   Mobile [phone]

## 2023-12-28 NOTE — TELEPHONE ENCOUNTER
She is still on very low dose.   I would recommend she increase the dose of gabapentin by 100 mg per week  She should increase it to 300 mg at night next  Then she should start with 100 mg in the morning along with 300 at night, then 200 in am and 300 in pm and then 300 bid  She is most likely not on adequate dose to provide relief    Meghan Painter MD

## 2023-12-28 NOTE — TELEPHONE ENCOUNTER
Called and spoke with patient. Reviewed provider message and instructions for increasing gabapentin by 100 mg per week. Reviewed instructions as noted, for titration.  Patient voiced good understanding and agreed with this plan.   Patient will update provider when reaches a dose that is effective for her.  No further questions at this time.      Myriam Hall RN  Clinical Triage/Primary Care  [facility]

## 2024-01-04 ENCOUNTER — THERAPY VISIT (OUTPATIENT)
Dept: OCCUPATIONAL THERAPY | Facility: CLINIC | Age: 69
End: 2024-01-04
Payer: COMMERCIAL

## 2024-01-04 DIAGNOSIS — M25.531 PAIN IN BOTH WRISTS: Primary | ICD-10-CM

## 2024-01-04 DIAGNOSIS — M25.532 PAIN IN BOTH WRISTS: Primary | ICD-10-CM

## 2024-01-04 DIAGNOSIS — G90.511 COMPLEX REGIONAL PAIN SYNDROME TYPE 1 OF RIGHT UPPER EXTREMITY: ICD-10-CM

## 2024-01-04 DIAGNOSIS — M18.0 PRIMARY OSTEOARTHRITIS OF BOTH FIRST CARPOMETACARPAL JOINTS: ICD-10-CM

## 2024-01-04 PROCEDURE — 97112 NEUROMUSCULAR REEDUCATION: CPT | Mod: GO | Performed by: OCCUPATIONAL THERAPIST

## 2024-01-04 PROCEDURE — 97140 MANUAL THERAPY 1/> REGIONS: CPT | Mod: GO | Performed by: OCCUPATIONAL THERAPIST

## 2024-01-04 PROCEDURE — 97110 THERAPEUTIC EXERCISES: CPT | Mod: GO | Performed by: OCCUPATIONAL THERAPIST

## 2024-01-16 ENCOUNTER — TELEPHONE (OUTPATIENT)
Dept: FAMILY MEDICINE | Facility: CLINIC | Age: 69
End: 2024-01-16
Payer: COMMERCIAL

## 2024-01-16 DIAGNOSIS — G90.511 COMPLEX REGIONAL PAIN SYNDROME TYPE 1 OF RIGHT UPPER EXTREMITY: Primary | ICD-10-CM

## 2024-01-16 RX ORDER — DULOXETIN HYDROCHLORIDE 20 MG/1
20 CAPSULE, DELAYED RELEASE ORAL 2 TIMES DAILY
Qty: 30 CAPSULE | Refills: 1 | Status: SHIPPED | OUTPATIENT
Start: 2024-01-16 | End: 2024-02-06

## 2024-01-16 NOTE — TELEPHONE ENCOUNTER
Have her wean down by one tablet every 3 days  When completely off of gabapentin, have her start cymbalta 20 mg. A prescription for this has been sent to her [facility] pharmacy    Meghan Painter MD

## 2024-01-16 NOTE — TELEPHONE ENCOUNTER
Pt notified via phone of provider message below as written in 11:55am note below.  Pt indicates understanding of issues and agrees with the plan.    ULICES RoaN, RN

## 2024-01-16 NOTE — TELEPHONE ENCOUNTER
Patient calling about the gabapentin she is on and believes she is having issues with it. She takes 3 pills at night, 2 in AM. At night, she takes them at 10pm and wakes up at 4 am, falls asleep between 4 and 4:30 and also wakes up at 6, so she is waking up constantly throughout the night. When her dose was increased to 2 pills in morning, she states she also notices more blurry vision (Had some blurry vision before, but the gabapentin has made it more pronounced) and dry mouth that is not fixed by using throat lozenges or water. Most sleep she is getting is between 3 - 3.5 hours of sleep and is seeking recommendation from provider.    Routing to provider to review and advise.    Quintin Lee RN  [facility]

## 2024-01-27 ENCOUNTER — HEALTH MAINTENANCE LETTER (OUTPATIENT)
Age: 69
End: 2024-01-27

## 2024-02-02 SDOH — HEALTH STABILITY: PHYSICAL HEALTH: ON AVERAGE, HOW MANY MINUTES DO YOU ENGAGE IN EXERCISE AT THIS LEVEL?: 20 MIN

## 2024-02-02 SDOH — HEALTH STABILITY: PHYSICAL HEALTH: ON AVERAGE, HOW MANY DAYS PER WEEK DO YOU ENGAGE IN MODERATE TO STRENUOUS EXERCISE (LIKE A BRISK WALK)?: 5 DAYS

## 2024-02-02 ASSESSMENT — SOCIAL DETERMINANTS OF HEALTH (SDOH): HOW OFTEN DO YOU GET TOGETHER WITH FRIENDS OR RELATIVES?: ONCE A WEEK

## 2024-02-06 ENCOUNTER — ORDERS ONLY (AUTO-RELEASED) (OUTPATIENT)
Dept: FAMILY MEDICINE | Facility: CLINIC | Age: 69
End: 2024-02-06

## 2024-02-06 ENCOUNTER — OFFICE VISIT (OUTPATIENT)
Dept: FAMILY MEDICINE | Facility: CLINIC | Age: 69
End: 2024-02-06
Payer: COMMERCIAL

## 2024-02-06 VITALS
TEMPERATURE: 97.5 F | OXYGEN SATURATION: 97 % | BODY MASS INDEX: 27.99 KG/M2 | HEART RATE: 68 BPM | RESPIRATION RATE: 16 BRPM | DIASTOLIC BLOOD PRESSURE: 68 MMHG | WEIGHT: 189 LBS | SYSTOLIC BLOOD PRESSURE: 137 MMHG | HEIGHT: 69 IN

## 2024-02-06 DIAGNOSIS — R11.0 NAUSEA: ICD-10-CM

## 2024-02-06 DIAGNOSIS — Z00.00 ENCOUNTER FOR MEDICARE ANNUAL WELLNESS EXAM: Primary | ICD-10-CM

## 2024-02-06 DIAGNOSIS — R25.1 SHAKY: ICD-10-CM

## 2024-02-06 DIAGNOSIS — L98.9 SKIN LESION: ICD-10-CM

## 2024-02-06 DIAGNOSIS — Z12.11 SCREEN FOR COLON CANCER: ICD-10-CM

## 2024-02-06 DIAGNOSIS — E28.39 ESTROGEN DEFICIENCY: ICD-10-CM

## 2024-02-06 DIAGNOSIS — G90.511 COMPLEX REGIONAL PAIN SYNDROME TYPE 1 OF RIGHT UPPER EXTREMITY: ICD-10-CM

## 2024-02-06 LAB
ALBUMIN SERPL BCG-MCNC: 4.4 G/DL (ref 3.5–5.2)
ALP SERPL-CCNC: 85 U/L (ref 40–150)
ALT SERPL W P-5'-P-CCNC: 26 U/L (ref 0–50)
ANION GAP SERPL CALCULATED.3IONS-SCNC: 14 MMOL/L (ref 7–15)
AST SERPL W P-5'-P-CCNC: 36 U/L (ref 0–45)
BILIRUB SERPL-MCNC: 0.6 MG/DL
BUN SERPL-MCNC: 13.8 MG/DL (ref 8–23)
CALCIUM SERPL-MCNC: 9.9 MG/DL (ref 8.8–10.2)
CHLORIDE SERPL-SCNC: 103 MMOL/L (ref 98–107)
CHOLEST SERPL-MCNC: 258 MG/DL
CREAT SERPL-MCNC: 0.82 MG/DL (ref 0.51–0.95)
DEPRECATED HCO3 PLAS-SCNC: 23 MMOL/L (ref 22–29)
EGFRCR SERPLBLD CKD-EPI 2021: 77 ML/MIN/1.73M2
ERYTHROCYTE [DISTWIDTH] IN BLOOD BY AUTOMATED COUNT: 11.7 % (ref 10–15)
FASTING STATUS PATIENT QL REPORTED: YES
GLUCOSE SERPL-MCNC: 89 MG/DL (ref 70–99)
HCT VFR BLD AUTO: 45.1 % (ref 35–47)
HDLC SERPL-MCNC: 54 MG/DL
HGB BLD-MCNC: 15.1 G/DL (ref 11.7–15.7)
LDLC SERPL CALC-MCNC: 189 MG/DL
MCH RBC QN AUTO: 30.4 PG (ref 26.5–33)
MCHC RBC AUTO-ENTMCNC: 33.5 G/DL (ref 31.5–36.5)
MCV RBC AUTO: 91 FL (ref 78–100)
NONHDLC SERPL-MCNC: 204 MG/DL
PLATELET # BLD AUTO: 261 10E3/UL (ref 150–450)
POTASSIUM SERPL-SCNC: 4 MMOL/L (ref 3.4–5.3)
PROT SERPL-MCNC: 7.6 G/DL (ref 6.4–8.3)
RBC # BLD AUTO: 4.97 10E6/UL (ref 3.8–5.2)
SODIUM SERPL-SCNC: 140 MMOL/L (ref 135–145)
TRIGL SERPL-MCNC: 75 MG/DL
TSH SERPL DL<=0.005 MIU/L-ACNC: 2.86 UIU/ML (ref 0.3–4.2)
WBC # BLD AUTO: 6.9 10E3/UL (ref 4–11)

## 2024-02-06 PROCEDURE — 80061 LIPID PANEL: CPT | Performed by: FAMILY MEDICINE

## 2024-02-06 PROCEDURE — G0439 PPPS, SUBSEQ VISIT: HCPCS | Performed by: FAMILY MEDICINE

## 2024-02-06 PROCEDURE — 99213 OFFICE O/P EST LOW 20 MIN: CPT | Mod: 25 | Performed by: FAMILY MEDICINE

## 2024-02-06 PROCEDURE — 85027 COMPLETE CBC AUTOMATED: CPT | Performed by: FAMILY MEDICINE

## 2024-02-06 PROCEDURE — 80053 COMPREHEN METABOLIC PANEL: CPT | Performed by: FAMILY MEDICINE

## 2024-02-06 PROCEDURE — 84443 ASSAY THYROID STIM HORMONE: CPT | Performed by: FAMILY MEDICINE

## 2024-02-06 PROCEDURE — 36415 COLL VENOUS BLD VENIPUNCTURE: CPT | Performed by: FAMILY MEDICINE

## 2024-02-06 RX ORDER — ONDANSETRON 4 MG/1
4 TABLET, ORALLY DISINTEGRATING ORAL EVERY 8 HOURS PRN
Qty: 15 TABLET | Refills: 1 | Status: SHIPPED | OUTPATIENT
Start: 2024-02-06 | End: 2024-06-18

## 2024-02-06 RX ORDER — RESPIRATORY SYNCYTIAL VIRUS VACCINE 120MCG/0.5
0.5 KIT INTRAMUSCULAR ONCE
Qty: 1 EACH | Refills: 0 | Status: CANCELLED | OUTPATIENT
Start: 2024-02-06 | End: 2024-02-06

## 2024-02-06 RX ORDER — DULOXETIN HYDROCHLORIDE 20 MG/1
20 CAPSULE, DELAYED RELEASE ORAL 2 TIMES DAILY
Qty: 90 CAPSULE | Refills: 1 | Status: CANCELLED | OUTPATIENT
Start: 2024-02-06

## 2024-02-06 ASSESSMENT — PAIN SCALES - GENERAL: PAINLEVEL: SEVERE PAIN (7)

## 2024-02-06 NOTE — PATIENT INSTRUCTIONS
Your Health Risk Assessment indicates you feel you are not in good health    A healthy lifestyle helps keep the body fit and the mind alert. It helps protect you from disease, helps you fight disease, and helps prevent chronic disease (disease that doesn't go away) from getting worse. This is important as you get older and begin to notice twinges in muscles and joints and a decline in the strength and stamina you once took for granted. A healthy lifestyle includes good healthcare, good nutrition, weight control, recreation, and regular exercise. Avoid harmful substances and do what you can to keep safe. Another part of a healthy lifestyle is stay mentally active and socially involved.    Good healthcare   Have a wellness visit every year.   If you have new symptoms, let us know right away. Don't wait until the next checkup.   Take medicines exactly as prescribed and keep your medicines in a safe place. Tell us if your medicine causes problems.   Healthy diet and weight control   Eat 3 or 4 small, nutritious, low-fat, high-fiber meals a day. Include a variety of fruits, vegetables, and whole-grain foods.   Make sure you get enough calcium in your diet. Calcium, vitamin D, and exercise help prevent osteoporosis (bone thinning).   If you live alone, try eating with others when you can. That way you get a good meal and have company while you eat it.   Try to keep a healthy weight. If you eat more calories than your body uses for energy, it will be stored as fat and you will gain weight.     Recreation   Recreation is not limited to sports and team events. It includes any activity that provides relaxation, interest, enjoyment, and exercise. Recreation provides an outlet for physical, mental, and social energy. It can give a sense of worth and achievement. It can help you stay healthy.    Mental Exercise and Social Involvement  Mental and emotional health is as important as physical health. Keep in touch with friends and  "family. Stay as active as possible. Continue to learn and challenge yourself.   Things you can do to stay mentally active are:  Learn something new, like a foreign language or musical instrument.   Play SCRABBLE or do crossword puzzles. If you cannot find people to play these games with you at home, you can play them with others on your computer through the Internet.   Join a games club--anything from card games to chess or checkers or lawn bowling.   Start a new hobby.   Go back to school.   Volunteer.   Read.   Keep up with world events.  Learning About Being Physically Active  What is physical activity?     Being physically active means doing any kind of activity that gets your body moving.  The types of physical activity that can help you get fit and stay healthy include:  Aerobic or \"cardio\" activities. These make your heart beat faster and make you breathe harder, such as brisk walking, riding a bike, or running. They strengthen your heart and lungs and build up your endurance.  Strength training activities. These make your muscles work against, or \"resist,\" something. Examples include lifting weights or doing push-ups. These activities help tone and strengthen your muscles and bones.  Stretches. These let you move your joints and muscles through their full range of motion. Stretching helps you be more flexible.  Reaching a balance between these three types of physical activity is important because each one contributes to your overall fitness.  What are the benefits of being active?  Being active is one of the best things you can do for your health. It helps you to:  Feel stronger and have more energy to do all the things you like to do.  Focus better at school or work.  Feel, think, and sleep better.  Reach and stay at a healthy weight.  Lose fat and build lean muscle.  Lower your risk for serious health problems, including diabetes, heart attack, high blood pressure, and some cancers.  Keep your heart, " "lungs, bones, muscles, and joints strong and healthy.  How can you make being active part of your life?  Start slowly. Make it your long-term goal to get at least 30 minutes of exercise on most days of the week. Walking is a good choice. You also may want to do other activities, such as running, swimming, cycling, or playing tennis or team sports.  Pick activities that you like--ones that make your heart beat faster, your muscles stronger, and your muscles and joints more flexible. If you find more than one thing you like doing, do them all. You don't have to do the same thing every day.  Get your heart pumping every day. Any activity that makes your heart beat faster and keeps it at that rate for a while counts.  Here are some great ways to get your heart beating faster:  Go for a brisk walk, run, or hike.  Go for a swim or bike ride.  Take an online exercise class or dance.  Play a game of touch football, basketball, or soccer.  Play tennis, pickleball, or racquetball.  Climb stairs.  Even some household chores can be aerobic. Just do them at a faster pace. Raking or mowing the lawn, sweeping the garage, and vacuuming and cleaning your home all can help get your heart rate up.  Strengthen your muscles during the week. You don't have to lift heavy weights or grow big, bulky muscles to get stronger. Doing a few simple activities that make your muscles work against, or \"resist,\" something can help you get stronger. Aim for at least twice a week.  For example, you can:  Do push-ups or sit-ups, which use your own body weight as resistance.  Lift weights or dumbbells or use stretch bands at home or in a gym or community center.  Stretch your muscles often. Stretching will help you as you become more active. It can help you stay flexible and loosen tight muscles. It can also help improve your balance and posture and can be a great way to relax.  Be sure to stretch the muscles you'll be using when you work out. It's best " "to warm your muscles slightly before you stretch them. Walk or do some other light aerobic activity for a few minutes. Then start stretching.  When you stretch your muscles:  Do it slowly. Stretching is not about going fast or making sudden movements.  Don't push or bounce during a stretch.  Hold each stretch for at least 15 to 30 seconds, if you can. You should feel a stretch in the muscle, but not pain.  Breathe out as you do the stretch. Then breathe in as you hold the stretch. Don't hold your breath.  If you're worried about how more activity might affect your health, have a checkup before you start. Follow any special advice your doctor gives you for getting a smart start.  Where can you learn more?  Go to https://www.[employer].net/patiented  Enter W332 in the search box to learn more about \"Learning About Being Physically Active.\"  Current as of: June 6, 2023               Content Version: 13.8    4579-8655 [employer].   Care instructions adapted under license by your healthcare professional. If you have questions about a medical condition or this instruction, always ask your healthcare professional. [employer] disclaims any warranty or liability for your use of this information.      Eating Healthy Foods: Care Instructions  With every meal, you can make healthy food choices. Try to eat a variety of fruits, vegetables, whole grains, lean proteins, and low-fat dairy products. This can help you get the right balance of nutrients, including vitamins and minerals. Small changes add up over time. You can start by adding one healthy food to your meals each day.    Try to make half your plate fruits and vegetables, one-fourth whole grains, and one-fourth lean proteins. Try including dairy with your meals.   Eat more fruits and vegetables. Try to have them with most meals and snacks.   Foods for healthy eating    Fruits    These can be fresh, frozen, canned, or dried.  Try to choose whole " "fruit rather than fruit juice.  Eat a variety of colors.    Vegetables    These can be fresh, frozen, canned, or dried.  Beans, peas, and lentils count too.    Whole grains    Choose whole-grain breads, cereals, and noodles.  Try brown rice.    Lean proteins    These can include lean meat, poultry, fish, and eggs.  You can also have tofu, beans, peas, lentils, nuts, and seeds.    Dairy    Try milk, yogurt, and cheese.  Choose low-fat or fat-free when you can.  If you need to, use lactose-free milk or fortified plant-based milk products, such as soy milk.    Water    Drink water when you're thirsty.  Limit sugar-sweetened drinks, including soda, fruit drinks, and sports drinks.  Where can you learn more?  Go to https://www.[employer].net/patiented  Enter T756 in the search box to learn more about \"Eating Healthy Foods: Care Instructions.\"  Current as of: February 28, 2023               Content Version: 13.8    2618-8523 [employer].   Care instructions adapted under license by your healthcare professional. If you have questions about a medical condition or this instruction, always ask your healthcare professional. [employer] disclaims any warranty or liability for your use of this information.      Nutrition for Older Adults: Care Instructions  Overview     Good nutrition is important at any age. But it is especially important for older adults. Eating a healthy diet helps keep your body strong. And it can help lower your risk for disease.  As you get older, your body needs more of certain nutrients. These include vitamin B12, calcium, and vitamin D. But it may be harder for you to get these and other important nutrients. This could be for many reasons. You may not feel as hungry as you used to. Or you could have problems with your teeth or mouth that make it hard to chew. Or you may not enjoy planning and preparing meals, especially if you live alone.  Talk with your doctor if you want help " getting the most nutrition from what you eat. The doctor may have you work with a dietitian to help you plan meals.  Follow-up care is a key part of your treatment and safety. Be sure to make and go to all appointments, and call your doctor if you are having problems. It's also a good idea to know your test results and keep a list of the medicines you take.  How can you care for yourself at home?  To stay healthy  Eat a variety of foods. The more you vary the foods you eat, the more vitamins, minerals, and other nutrients you get.  Take a multivitamin every day. Choose one with about 100% of the daily value (DV) for vitamins and minerals. Do not take more than 100% of the daily value for any vitamin or mineral unless your doctor tells you to. Talk with your doctor if you are not sure which multivitamin is right for you.  Eat lots of fruits and vegetables. Fresh, frozen, or no-salt canned vegetables and fruits in their own juice or light syrup are good choices.  Include foods that are high in vitamin B12 in your diet. Good choices are fortified breakfast cereal, nonfat or low-fat milk and other dairy products, meat, poultry, fish, and eggs.  Get enough calcium and vitamin D. Good choices include nonfat or low-fat milk, cheese, and yogurt. Other good options are tofu, orange juice with added calcium, and some leafy green vegetables, such as dwayne greens and kale. If you don't use milk products, talk to your doctor about calcium and vitamin D supplements.  Eat protein foods every day. Good choices include lean meat, fish, poultry, eggs, and cheese. Other good options are cooked beans, peanut butter, and nuts and seeds.  Choose whole grains for half of the grains you eat. Look for 100% whole wheat bread, whole-grain cereals, brown rice, and other whole grains.  If you have constipation  Eat high-fiber foods every day. These include fruits, vegetables, cooked dried beans, and whole grains.  Drink plenty of fluids. If  you have kidney, heart, or liver disease and have to limit fluids, talk with your doctor before you increase the amount of fluids you drink.  Ask your doctor if stool softeners may help keep your bowels regular.  If you have mouth problems that make chewing hard  Pick canned or cooked fruits and vegetables. These are often softer.  Chop or shred meat, poultry, and fish. Add sauce or gravy to the meat to help keep it moist.  Pick other protein foods that are soft. These include cheese, peanut butter, cooked beans, cottage cheese, and eggs.  If you have trouble shopping for yourself  Ask a local food store to deliver groceries to your home.  Contact a volunteer center and ask for help.  Ask a family member or neighbor to help you.  If you have trouble preparing meals  If you are able, take a cooking class.  Use a microwave oven to cook TV dinners and other frozen or prepared foods.  Take part in group meal programs. You can find these through senior citizen programs.  Have meals brought to your home. Your community may offer programs that deliver meals, such as Meals on Wheels.  If your appetite is poor  Try eating smaller amounts of food more often. For example, eat 4 or 5 small meals a day instead of 1 or 2 large meals.  Eat with family and friends. Or take part in group meal programs offered through volunteer programs. Eating with others may help your appetite. And it helps you be more social.  Ask your doctor if your medicines could cause appetite or taste problems. If so, ask about changing medicines.  Add spices and herbs to increase the flavor of food.  If you think you are depressed, ask your doctor for help. Depression can affect your appetite. And it can make it hard to do everyday activities like grocery shopping and making meals. Treatment can help.  When should you call for help?  Watch closely for changes in your health, and be sure to contact your doctor if you have any problems.  Where can you learn  "more?  Go to https://www.[employer].net/patiented  Enter L643 in the search box to learn more about \"Nutrition for Older Adults: Care Instructions.\"  Current as of: February 26, 2023               Content Version: 13.8    9366-3792 [employer].   Care instructions adapted under license by your healthcare professional. If you have questions about a medical condition or this instruction, always ask your healthcare professional. [employer] disclaims any warranty or liability for your use of this information.      Bladder Training: Care Instructions  Your Care Instructions     Bladder training is used to treat urge incontinence and stress incontinence. Urge incontinence means that the need to urinate comes on so fast that you can't get to a toilet in time. Stress incontinence means that you leak urine because of pressure on your bladder. For example, it may happen when you laugh, cough, or lift something heavy.  Bladder training can increase how long you can wait before you have to urinate. It can also help your bladder hold more urine. And it can give you better control over the urge to urinate.  It is important to remember that bladder training takes a few weeks to a few months to make a difference. You may not see results right away, but don't give up.  Follow-up care is a key part of your treatment and safety. Be sure to make and go to all appointments, and call your doctor if you are having problems. It's also a good idea to know your test results and keep a list of the medicines you take.  How can you care for yourself at home?  Work with your doctor to come up with a bladder training program that is right for you. You may use one or more of the following methods.  Delayed urination  In the beginning, try to keep from urinating for 5 minutes after you first feel the need to go.  While you wait, take deep, slow breaths to relax. Kegel exercises can also help you delay the need to go to " "the bathroom.  After some practice, when you can easily wait 5 minutes to urinate, try to wait 10 minutes before you urinate.  Slowly increase the waiting period until you are able to control when you have to urinate.  Scheduled urination  Empty your bladder when you first wake up in the morning.  Schedule times throughout the day when you will urinate.  Start by going to the bathroom every hour, even if you don't need to go.  Slowly increase the time between trips to the bathroom.  When you have found a schedule that works well for you, keep doing it.  If you wake up during the night and have to urinate, do it. Apply your schedule to waking hours only.  Kegel exercises  These tighten and strengthen pelvic muscles, which can help you control the flow of urine. (If doing these exercises causes pain, stop doing them and talk with your doctor.) To do Kegel exercises:  Squeeze your muscles as if you were trying not to pass gas. Or squeeze your muscles as if you were stopping the flow of urine. Your belly, legs, and buttocks shouldn't move.  Hold the squeeze for 3 seconds, then relax for 5 to 10 seconds.  Start with 3 seconds, then add 1 second each week until you are able to squeeze for 10 seconds.  Repeat the exercise 10 times a session. Do 3 to 8 sessions a day.  When should you call for help?  Watch closely for changes in your health, and be sure to contact your doctor if:    Your incontinence is getting worse.     You do not get better as expected.   Where can you learn more?  Go to https://www.[employer].net/patiented  Enter V684 in the search box to learn more about \"Bladder Training: Care Instructions.\"  Current as of: February 28, 2023               Content Version: 13.8    [id number] [employer].   Care instructions adapted under license by your healthcare professional. If you have questions about a medical condition or this instruction, always ask your healthcare professional. [employer] disclaims any warranty or liability for your use of this information.      Preventive Care Advice   This is general advice given by our system to help you stay healthy. However, your care team may have specific advice just for you. Please talk to your care team about your preventive care needs.  Nutrition  Eat 5 or more servings of fruits and vegetables each day.  Try wheat bread, brown rice and whole grain pasta (instead of white bread, rice, and pasta).  Get enough calcium and vitamin D. Check the label on foods and aim for 100% of the RDA (recommended daily allowance).  Lifestyle  Exercise at least 150 minutes each week  (30 minutes a day, 5 days a week).  Do muscle strengthening activities 2 days a week. These help control your weight and prevent disease.  No smoking.  Wear sunscreen to prevent skin cancer.  Have a dental exam and cleaning every 6 months.  Yearly exams  See your health care team every year to talk about:  Any changes in your health.  Any medicines your care team has prescribed.  Preventive care, family planning, and ways to prevent chronic diseases.  Shots (vaccines)   HPV shots (up to age 26), if you've never had them before.  Hepatitis B shots (up to age 59), if you've never had them before.  COVID-19 shot: Get this shot when it's due.  Flu shot: Get a flu shot every year.  Tetanus shot: Get a tetanus shot every 10 years.  Pneumococcal, hepatitis A, and RSV shots: Ask your care team if you need these based on your risk.  Shingles shot (for age 50 and up)  General health tests  Diabetes screening:  Starting at age 35, Get screened for diabetes at least every 3 years.  If you are younger than age 35, ask your care team if you should be screened for diabetes.  Cholesterol test: At age 39, start having a cholesterol test every 5 years, or more often if advised.  Bone density scan (DEXA): At age 50, ask your care team if you should have this scan for osteoporosis (brittle  bones).  Hepatitis C: Get tested at least once in your life.  STIs (sexually transmitted infections)  Before age 24: Ask your care team if you should be screened for STIs.  After age 24: Get screened for STIs if you're at risk. You are at risk for STIs (including HIV) if:  You are sexually active with more than one person.  You don't use condoms every time.  You or a partner was diagnosed with a sexually transmitted infection.  If you are at risk for HIV, ask about PrEP medicine to prevent HIV.  Get tested for HIV at least once in your life, whether you are at risk for HIV or not.  Cancer screening tests  Cervical cancer screening: If you have a cervix, begin getting regular cervical cancer screening tests starting at age 21.  Breast cancer scan (mammogram): If you've ever had breasts, begin having regular mammograms starting at age 40. This is a scan to check for breast cancer.  Colon cancer screening: It is important to start screening for colon cancer at age 45.  Have a colonoscopy test every 10 years (or more often if you're at risk) Or, ask your provider about stool tests like a FIT test every year or Cologuard test every 3 years.  To learn more about your testing options, visit:   https://www.[employer]/335508.pdf.  For help making a decision, visit:   https://bit.ly/qj65912.  Prostate cancer screening test: If you have a prostate, ask your care team if a prostate cancer screening test (PSA) at age 55 is right for you.  Lung cancer screening: If you are a current or former smoker ages 50 to 80, ask your care team if ongoing lung cancer screenings are right for you.  For informational purposes only. Not to replace the advice of your health care provider. Copyright   2023 [employer]. All rights reserved. Clinically reviewed by the [facility] Transitions Program. Drive.SG 733939 - REV 01/24.     Health care proxy form given and instructions provided/No

## 2024-02-06 NOTE — PROGRESS NOTES
Preventive Care Visit  [facility]  Meghan Painter MD, Family Medicine  Feb 6, 2024    Assessment & Plan     Encounter for Medicare annual wellness exam    - Lipid panel reflex to direct LDL Fasting; Future    Complex regional pain syndrome type 1 of right upper extremity  Off gabapentin and feels okay. Will monitor for now    Nausea  Use as needed for suspected nausea from stopping the gabapentin  - ondansetron (ZOFRAN ODT) 4 MG ODT tab; Take 1 tablet (4 mg) by mouth every 8 hours as needed for nausea    Skin lesion  Refer to derm  - Adult Dermatology  Referral; Future    [place]  Suspect symptoms due to withdrawal from gabapentin as they started when she started the taper, anticipate they will continue to improve, she will reach out if not  - **TSH with free T4 reflex FUTURE 2mo; Future  - **CBC with platelets FUTURE 2mo; Future  - **Comprehensive metabolic panel FUTURE 2mo; Future    Estrogen deficiency  Due for dexa  - DX Hip/Pelvis/Spine; Future    Screen for colon cancer  Last colonoscopy normal, no family history of colon cancer  - COLOGUARD([employer]); Future            Counseling  Appropriate preventive services were discussed with this patient, including applicable screening as appropriate for fall prevention, nutrition, physical activity, Tobacco-use cessation, weight loss and cognition.  Checklist reviewing preventive services available has been given to the patient.  Reviewed patient's diet, addressing concerns and/or questions.   Information on urinary incontinence and treatment options given to patient.     Patient has been advised of split billing requirements and indicates understanding: Yes        Amelie Gould is a 68 year old, presenting for the following:  Physical        2/6/2024     9:18 AM   Additional Questions   Roomed by dann       Health Care Directive  Patient does not have a Health Care Directive or Living Will: Discussed advance care  planning with patient; information given to patient to review.    HPI      Stopped gabapentin around  the same time as feeling  nausea, dizzy, headache, weak, and shaky inside, light sensitive to the point of having to wear sun glasses in the house, blurry vision             2/2/2024   General Health   How would you rate your overall physical health? (!) FAIR   Feel stress (tense, anxious, or unable to sleep) Not at all         2/2/2024   Nutrition   Diet: Regular (no restrictions)         2/2/2024   Exercise   Days per week of moderate/strenous exercise 5 days   Average minutes spent exercising at this level 20 min         2/2/2024   Social Factors   Frequency of gathering with friends or relatives Once a week   Worry food won't last until get money to buy more No   Food not last or not have enough money for food? No   Do you have housing?  Yes   Are you worried about losing your housing? No   Lack of transportation? No   Unable to get utilities (heat,electricity)? No         2/2/2024   Fall Risk   Fallen 2 or more times in the past year? No    No   Trouble with walking or balance? No          2/2/2024   Activities of Daily Living- Home Safety   Needs help with the following daily activites None of the above   Safety concerns in the home None of the above         2/2/2024   Dental   Dentist two times every year? Yes         2/2/2024   Hearing Screening   Hearing concerns? None of the above         2/2/2024   Driving Risk Screening   Patient/family members have concerns about driving No         2/2/2024   General Alertness/Fatigue Screening   Have you been more tired than usual lately? (!) DECLINE         2/2/2024   Urinary Incontinence Screening   Bothered by leaking urine in past 6 months Yes         2/2/2024   TB Screening   Were you born outside of [place]?  No         Today's PHQ-2 Score:       2/6/2024     9:18 AM   PHQ-2 ( 1999 [employer])   Q1: Little interest or pleasure in doing things 0   Q2: Feeling down,  depressed or hopeless 0   PHQ-2 Score 0   Q1: Little interest or pleasure in doing things Not at all   Q2: Feeling down, depressed or hopeless Not at all   PHQ-2 Score 0           2024   Substance Use   Alcohol more than 3/day or more than 7/wk No   Do you have a current opioid prescription? No   How severe/bad is pain from 1 to 10? 3/10   Do you use any other substances recreationally? No     Social History     Tobacco Use    Smoking status: Never    Smokeless tobacco: Never   Vaping Use    Vaping Use: Never used   Substance Use Topics    Alcohol use: Yes     Comment: 1 drink per month    Drug use: No     No family history of breast cancer    Mammogram Screening - Biennial screen by mutual decision with patient      History of abnormal Pap smear: NO - age 65 - see link Cervical Cytology Screening Guidelines        2017    12:00 AM 2011    12:00 AM   PAP / HPV   PAP (Historical)  NIL    PAP-ABSTRACT See Scanned Document            This result is from an external source.     The 10-year ASCVD risk score (Cody RODRIGUEZ, et al., 2019) is: 9.3%    Values used to calculate the score:      Age: 68 years      Sex: Female      Is Non- : No      Diabetic: No      Tobacco smoker: No      Systolic Blood Pressure: 137 mmHg      Is BP treated: No      HDL Cholesterol: 65 mg/dL      Total Cholesterol: 271 mg/dL    Fracture Risk Assessment Tool  Link to Frax Calculator  Use the information below to complete the Frax calculator  : 1955  Sex: female  Weight (kg): 85.7 kg (actual weight)  Height (cm): 174 cm  Previous Fragility Fracture:  No  History of parent with fractured hip:  No  Current Smoking:  No  Patient has been on glucocorticoids for more than 3 months (5mg/day or more): No  Rheumatoid Arthritis on Problem List:  No  Secondary Osteoporosis on Problem List:  No  Consumes 3 or more units of alcohol per day: No            Reviewed and updated as needed this visit by Provider           "            Current providers sharing in care for this patient include:  Patient Care Team:  Meghan Painter MD as PCP - General (Family Practice)  Meghan Painter MD as Assigned PCP  Dominic Jensen PA-C as Physician Assistant (Emergency Medicine)  Stephenie Talbert APRN CNP as Nurse Practitioner (Dermatology)  Charles Acosta MD as Assigned Musculoskeletal Provider    The following health maintenance items are reviewed in Epic and correct as of today:  Health Maintenance   Topic Date Due    DEXA  Never done    ANNUAL REVIEW OF HM ORDERS  Never done    ZOSTER IMMUNIZATION (1 of 2) Never done    RSV VACCINE (Pregnancy & 60+) (1 - 1-dose 60+ series) Never done    Pneumococcal Vaccine: 65+ Years (1 of 1 - PCV) Never done    INFLUENZA VACCINE (1) 09/01/2023    COVID-19 Vaccine (4 - 2023-24 season) 09/01/2023    COLORECTAL CANCER SCREENING  06/26/2024    MEDICARE ANNUAL WELLNESS VISIT  02/06/2025    FALL RISK ASSESSMENT  02/06/2025    MAMMO SCREENING  02/17/2025    GLUCOSE  12/26/2025    LIPID  12/26/2027    ADVANCE CARE PLANNING  12/26/2027    DTAP/TDAP/TD IMMUNIZATION (4 - Td or Tdap) 12/26/2032    HEPATITIS C SCREENING  Completed    PHQ-2 (once per calendar year)  Completed    IPV IMMUNIZATION  Aged Out    HPV IMMUNIZATION  Aged Out    MENINGITIS IMMUNIZATION  Aged Out    RSV MONOCLONAL ANTIBODY  Aged Out    PAP  Discontinued     Review of Systems    Review of Systems  Constitutional, HEENT, cardiovascular, pulmonary, gi and gu systems are negative, except as otherwise noted.     Objective    Exam  /68   Pulse 68   Temp 97.5  F (36.4  C) (Oral)   Resp 16   Ht 1.74 m (5' 8.5\")   Wt 85.7 kg (189 lb)   SpO2 97%   BMI 28.32 kg/m     Estimated body mass index is 28.32 kg/m  as calculated from the following:    Height as of this encounter: 1.74 m (5' 8.5\").    Weight as of this encounter: 85.7 kg (189 lb).    Physical Exam  GENERAL: alert and no distress  EYES: Eyes grossly normal to " inspection, PERRL and conjunctivae and sclerae normal  HENT: ear canals and TM's normal, nose and mouth without ulcers or lesions  NECK: no adenopathy, no asymmetry, masses, or scars  RESP: lungs clear to auscultation - no rales, rhonchi or wheezes  CV: regular rate and rhythm, normal S1 S2, no S3 or S4, no murmur, click or rub, no peripheral edema  ABDOMEN: soft, nontender, no hepatosplenomegaly, no masses and bowel sounds normal  MS: no gross musculoskeletal defects noted, no edema  SKIN: no suspicious lesions or rashes, round scaly flat lesion on back of right leg on calf  NEURO: Normal strength and tone, mentation intact and speech normal  PSYCH: mentation appears normal, affect normal/bright        2/6/2024   Mini Cog   Mini-Cog Not Completed (choose reason) Patient declines       Patient declines, there are NO concerns for cognitive deficits.    No concerns based on direct observation           Signed Electronically by: Meghan Painter MD

## 2024-02-07 ENCOUNTER — THERAPY VISIT (OUTPATIENT)
Dept: OCCUPATIONAL THERAPY | Facility: CLINIC | Age: 69
End: 2024-02-07
Payer: COMMERCIAL

## 2024-02-07 ENCOUNTER — ANCILLARY PROCEDURE (OUTPATIENT)
Dept: BONE DENSITY | Facility: CLINIC | Age: 69
End: 2024-02-07
Attending: FAMILY MEDICINE
Payer: COMMERCIAL

## 2024-02-07 DIAGNOSIS — M25.532 PAIN IN BOTH WRISTS: Primary | ICD-10-CM

## 2024-02-07 DIAGNOSIS — E28.39 ESTROGEN DEFICIENCY: ICD-10-CM

## 2024-02-07 DIAGNOSIS — M25.531 PAIN IN BOTH WRISTS: Primary | ICD-10-CM

## 2024-02-07 DIAGNOSIS — G90.511 COMPLEX REGIONAL PAIN SYNDROME TYPE 1 OF RIGHT UPPER EXTREMITY: ICD-10-CM

## 2024-02-07 DIAGNOSIS — M18.0 PRIMARY OSTEOARTHRITIS OF BOTH FIRST CARPOMETACARPAL JOINTS: ICD-10-CM

## 2024-02-07 PROCEDURE — 97110 THERAPEUTIC EXERCISES: CPT | Mod: GO | Performed by: OCCUPATIONAL THERAPIST

## 2024-02-07 PROCEDURE — 77080 DXA BONE DENSITY AXIAL: CPT | Mod: TC | Performed by: RADIOLOGY

## 2024-02-07 NOTE — PROGRESS NOTES
02/07/24 0500   Appointment Info   Treating Provider Jaja Dennison, OTR/L, CHT   Total/Authorized Visits 6   Visits Used 4   Medical Diagnosis B wrist pain, B th CMC arthritis, R UE CPRS   OT Tx Diagnosis B wrist pain R > L, right ulnar wrist and left radial wrist/th CMC   Quick Add  Certification   Progress Note/Certification   Start Of Care Date 11/30/23   Onset of Illness/Injury or Date of Surgery 11/22/23  (therapy referral)   Therapy Frequency 1 x per month   Predicted Duration 2 month   Certification date from 02/28/24   Certification date to 04/06/24   Progress Note Due Date 04/06/24   Progress Note Completed Date 02/07/24   OT Goal 1   Goal Identifier gripping/cutting   Goal Description Pt will increase ROM and strength and decrease pain to be able to  knife and cut food with mild difficulty, pain 3/10 or less   Rationale In order to maximize safety and independence with performance of self-care activities  (self)   Goal Progress pain 2/10, difficulty due to cont weakness, goal extended   Target Date 04/06/24   Subjective Report   Subjective Report The wrists and hands are feeling better. I have been off the medication. My pain is much better.   Objective Measures   Objective Measures Objective Measure 1;Objective Measure 2;Hand Obj Measures   Hand Objective Measures ROM;Strength   ROM Forearm ROM;Wrist ROM   Strength ;Lateral Pinch;3 Point Pinch   Objective Measure 1   Objective Measure pain at rest 0-10   Details L:0, R:1-2   Objective Measure 2   Objective Measure pain with use 0-10   Details L:0, R:2-3   Forearm ROM   Supination L:90-, R:90-   Pronation  L:90-, R:85-   Wrist ROM    Extension L:75-, R:75-   Flexion L:70-, R:70+ slight   Radial Deviation (RD) L:15-, R:20+ slight   Ulnar Deviation (UD) L:35-, R:25+    (measured in pounds)    Average Strength L:23-, R:25+   Lateral Pinch (measured in pounds)   Lateral Pinch Average Strength L:9-, R:8-   3 Point Pinch (measured in pounds)   3  Point Pinch Average Strength L:7-, R:8-   Treatment Interventions (OT)   Interventions Therapeutic Procedure/Exercise   Self Care/Home Management   Self Care 1 HEP   Self Care 1 - Details jt protection principles for CMC jt/thumb   Self Care 2 edema mmt   Self Care 2 - Details overhead fisting   PTRx Self Care 1 Warmth   PTRx Self Care 1 - Details 10 mins for stiffness   PTRx Self Care 2 Ball Massage to Extensors   PTRx Self Care 2 - Details 5 mins massage right forearm avoid nerve   Skilled Intervention to decrease pain and increase mobility   Patient Response/Progress verbalized understanding   Neuromuscular Re-education   Neuro Re-ed 1 GMI   Neuro Re-ed 1 - Details mirror box with AROM L hand only, R hand in box with no ROM   Skilled Intervention to decrease pain   Patient Response/Progress cont with HEP   Therapeutic Procedure/Exercise   Therapeutic Procedure: strength, endurance, ROM, flexibillity minutes (67504) 25   PTRx Ther Proc 1 Wrist Active Range of Motion Extension/Flexion with Gravity Eliminated   PTRx Ther Proc 1 - Details 10 reps   PTRx Ther Proc 2 Wrist Strengthening Isometric Extension   PTRx Ther Proc 2 - Details 10 reps avoid pain   PTRx Ther Proc 3 Wrist Strengthening Isometric Flexion   PTRx Ther Proc 3 - Details 10 reps avoid pain   Skilled Intervention to increase ROM and strength/stability   Patient Response/Progress catarion well   PTRx Ther Proc 4 Wrist Strengthening Isometric Radial Deviation   PTRx Ther Proc 4 - Details 10 reps hold 5 seconds avoid pain   PTRx Ther Proc 5 Wrist Strengthening Isometric Ulnar Deviation   PTRx Ther Proc 5 - Details 10 reps hold 5 seconds avoid pain   PTRx Ther Proc 6 Hand Strengthening Gripping   PTRx Ther Proc 6 - Details 10 reps use rolled wash cloth hold each rep 10 secs avoid pain   PTRx Ther Proc 7 Thumb Stabilization Strengthening Isometric C   PTRx Ther Proc 7 - Details 10 reps 10 seconds   PTRx Ther Proc 8 Thumb Stabilization 1st Dorsal Interosseous    PTRx Ther Proc 8 - Details 10 reps 10 seconds   Education   Learner/Method Patient;Demonstration;Pictures/Video   Education Comments printed PTRx   Plan   Plan for next session pain and AROM much improved, progress to strengthening, pt to cont with HEP, if increased pain or questions/concerns will schedule f/u, hold chart for 2 months   Total Session Time   Timed Code Treatment Minutes 25   Total Treatment Time (sum of timed and untimed services) 25     Functional Outcome Measure:  Upper Extremity Functional Index  SCORE:   Column Totals: 49/80  (A lower score indicates greater disability.)        [facility]                                                                                   OUTPATIENT OCCUPATIONAL THERAPY    PLAN OF TREATMENT FOR OUTPATIENT REHABILITATION   Patient's Last Name, First Name, Bryanna Lyman YOB: 1955   Provider's Name   [facility]   Medical Record No.  [id number]     Onset Date: 11/22/23 (therapy referral) Start of Care Date: 11/30/23     Medical Diagnosis:  B wrist pain, B th CMC arthritis, R UE CPRS      OT Treatment Diagnosis:  B wrist pain R > L, right ulnar wrist and left radial wrist/th CMC Plan of Treatment  Frequency/Duration:1 x per month/2 month    Certification date from 02/28/24   To 04/06/24        See note for plan of treatment details and functional goals     Jaja Dennison OT                         I CERTIFY THE NEED FOR THESE SERVICES FURNISHED UNDER        THIS PLAN OF TREATMENT AND WHILE UNDER MY CARE     (Physician attestation of this document indicates review and certification of the therapy plan).              Referring Provider:  Delroy Humphrey    Initial Assessment  See Epic Evaluation- 11/30/23        PLAN  Continue therapy per current plan of care.    Beginning/End Dates of Progress Note Reporting Period:  11/30/2023 to 02/07/2024    Referring Provider:  Delroy Harmon  Link

## 2024-02-08 ENCOUNTER — TELEPHONE (OUTPATIENT)
Dept: FAMILY MEDICINE | Facility: CLINIC | Age: 69
End: 2024-02-08
Payer: COMMERCIAL

## 2024-02-08 DIAGNOSIS — Z91.89 10 YEAR RISK OF MI OR STROKE 7.5% OR GREATER: Primary | ICD-10-CM

## 2024-02-08 NOTE — TELEPHONE ENCOUNTER
General Call      Reason for Call:  question regarding calcium and cholesterol    What are your questions or concerns:  Patient was advised that she should take 1500 MG of calcium.   She can only find 1200 mg of calcium, and not sure where to find 1500mg calcium. They also seem to have a combination of other added vitamins such as magnesium and zinc.   Can she take the calcium that has the added vitamins, or should she just take 3X 1500 mg of just calcium?    She is also asking if she can take Tumeric as well?    In her [facility] message she was asked if she wanted to talk further regarding her cholesterol labs- and she would like to talk to you about this.   -she does not want to take any powders- she would rather stick pills    Date of last appointment with provider: 2/6/24    Could we send this information to you in [facility] or would you prefer to receive a phone call?:   Patient would prefer a phone call AND [employer] message  Okay to leave a detailed message?: Yes at Cell number on file:    Telephone Information:   Mobile [phone]

## 2024-02-09 RX ORDER — ATORVASTATIN CALCIUM 10 MG/1
10 TABLET, FILM COATED ORAL DAILY
Qty: 90 TABLET | Refills: 1 | Status: SHIPPED | OUTPATIENT
Start: 2024-02-09 | End: 2024-08-06

## 2024-02-09 NOTE — TELEPHONE ENCOUNTER
Okay to take the calcium 1200 as I am sure she is getting some calcium for her diet. As I stated in her note, she should figure out how much calcium she is getting in her diet, usually about 300 mg per source and that is included in the 1500 mg.     Okay for tumeric, remind her it can thin her blood so would need to stop before any surgeries    In regards to her cholesterol, her 10 year risk of heart disease is 9.6%.  given it is higher then 7.5%, guidelines recommend she treat it with medication to lower if able. Generally it is treated with medication like lipitor or crestor.    What other questions does she have.     Meghan Painter MD

## 2024-02-09 NOTE — TELEPHONE ENCOUNTER
Pt notified of provider message as written. She would like the statin prescription sent to [facility].  Her grandmother took powder form but pt wants pill form. Advised it is usually pill form  reviewed the most common side effect is muscle cramping and if that occurs let us know.  Pt agrees to plan.    Please send prescription for statin to pharmacy. We do not need to call her when it is sent.  Mady ELENAN, RN

## 2024-02-09 NOTE — TELEPHONE ENCOUNTER
Called patient and left message for patient to return call.     RN please give provider message as written below when patient calls back.    Marlene Bliss RN

## 2024-02-24 LAB — NONINV COLON CA DNA+OCC BLD SCRN STL QL: NEGATIVE

## 2024-04-08 PROBLEM — G90.511 COMPLEX REGIONAL PAIN SYNDROME TYPE 1 OF RIGHT UPPER EXTREMITY: Status: RESOLVED | Noted: 2023-11-30 | Resolved: 2024-04-08

## 2024-04-08 PROBLEM — M25.532 PAIN IN BOTH WRISTS: Status: RESOLVED | Noted: 2023-11-30 | Resolved: 2024-04-08

## 2024-04-08 PROBLEM — M25.531 PAIN IN BOTH WRISTS: Status: RESOLVED | Noted: 2023-11-30 | Resolved: 2024-04-08

## 2024-04-08 PROBLEM — M18.0 PRIMARY OSTEOARTHRITIS OF BOTH FIRST CARPOMETACARPAL JOINTS: Status: RESOLVED | Noted: 2023-11-30 | Resolved: 2024-04-08

## 2024-05-17 ENCOUNTER — TRANSFERRED RECORDS (OUTPATIENT)
Dept: HEALTH INFORMATION MANAGEMENT | Facility: CLINIC | Age: 69
End: 2024-05-17
Payer: COMMERCIAL

## 2024-06-15 ENCOUNTER — NURSE TRIAGE (OUTPATIENT)
Dept: NURSING | Facility: CLINIC | Age: 69
End: 2024-06-15
Payer: COMMERCIAL

## 2024-06-15 NOTE — TELEPHONE ENCOUNTER
The patient reports she bent over to retrieve an item out of the drawer, and then when she attempted to stand up straight, she was unable to  She reports pain in the lower back side area with taking a deep breath.   She reports muscle spasms when attempting to sit on the toilet and reports she can not bend at all  Incident happened yesterday on 06/14/24  Advil taken one day ago with minimal relief  Heating pad provides some heat, the cold aggravetes, and she reports she is uanble to lay to side  She rates the pain to be sharp and is excruciating  Pain 2/10 with laying or sitting still  Triage guidelines recommend to go to ED now  Caller verbalized and understands directives    Reason for Disposition   [1] SEVERE back pain (e.g., excruciating) AND [2] sudden onset AND [3] age > 60 years    Additional Information   Negative: Passed out (i.e., lost consciousness, collapsed and was not responding)   Negative: Shock suspected (e.g., cold/pale/clammy skin, too weak to stand, low BP, rapid pulse)   Negative: Sounds like a life-threatening emergency to the triager   Negative: Major injury to the back (e.g., MVA, fall > 10 feet or 3 meters, penetrating injury, etc.)   Negative: Followed a tailbone injury   Negative: [1] Pain in the upper back over the ribs (rib cage) AND [2] radiates (travels, goes) into chest   Negative: [1] Pain in the upper back over the ribs (rib cage) AND [2] worsened by coughing (or clearly increases with breathing)   Negative: Back pain during pregnancy   Negative: Pain mainly in flank (i.e., in the side, over the lower ribs or just below the ribs)    Protocols used: Back Pain-A-AH

## 2024-06-18 ENCOUNTER — OFFICE VISIT (OUTPATIENT)
Dept: FAMILY MEDICINE | Facility: CLINIC | Age: 69
End: 2024-06-18
Payer: COMMERCIAL

## 2024-06-18 VITALS
TEMPERATURE: 95.6 F | SYSTOLIC BLOOD PRESSURE: 136 MMHG | WEIGHT: 184 LBS | HEART RATE: 64 BPM | OXYGEN SATURATION: 97 % | DIASTOLIC BLOOD PRESSURE: 80 MMHG | HEIGHT: 69 IN | BODY MASS INDEX: 27.25 KG/M2 | RESPIRATION RATE: 20 BRPM

## 2024-06-18 DIAGNOSIS — S39.012A STRAIN OF MUSCLE, FASCIA AND TENDON OF LOWER BACK, INITIAL ENCOUNTER: Primary | ICD-10-CM

## 2024-06-18 PROCEDURE — 99213 OFFICE O/P EST LOW 20 MIN: CPT | Performed by: PHYSICIAN ASSISTANT

## 2024-06-18 RX ORDER — TIZANIDINE HYDROCHLORIDE 4 MG/1
4 CAPSULE, GELATIN COATED ORAL 3 TIMES DAILY
Qty: 30 CAPSULE | Refills: 1 | Status: SHIPPED | OUTPATIENT
Start: 2024-06-18

## 2024-06-18 RX ORDER — METHYLPREDNISOLONE 4 MG
TABLET, DOSE PACK ORAL
Qty: 21 TABLET | Refills: 0 | Status: SHIPPED | OUTPATIENT
Start: 2024-06-18

## 2024-06-18 ASSESSMENT — ENCOUNTER SYMPTOMS: BACK PAIN: 1

## 2024-06-18 ASSESSMENT — PAIN SCALES - GENERAL: PAINLEVEL: SEVERE PAIN (7)

## 2024-06-18 NOTE — PROGRESS NOTES
"  Assessment & Plan     Strain of muscle, fascia and tendon of lower back, initial encounter  Treat with the following along with the ice /heat, start stretching if tolerated.   Therapy recommended in the future.   - methylPREDNISolone (MEDROL DOSEPAK) 4 MG tablet therapy pack; Follow Package Directions  - tiZANidine (ZANAFLEX) 4 MG capsule; Take 1 capsule (4 mg) by mouth 3 times daily  - Physical Therapy  Referral; Future    \F2      BMI  Estimated body mass index is 27.57 kg/m  as calculated from the following:    Height as of this encounter: 1.74 m (5' 8.5\").    Weight as of this encounter: 83.5 kg (184 lb).             Amelie Gould is a 69 year old, presenting for the following health issues:  Back Pain (Left side mid/lower back pain, started 06/13/24 possible pulled muscle or muscle spasms )        6/18/2024    10:43 AM   Additional Questions   Roomed by James FULLER MA     Back Pain        Left side mid/lower back pain, started 06/13/24 possible pulled muscle or muscle spasms. Always a numbing pain that is not going away.       She developed a sudden pain in the left low back after reaching out and twisting. The pain is manageable when she is still. She has been trying to use heat / ice and ibuprofen. Any twisting movement will cause the sharp spasm and some radiation upward.   No weakness, numbness in her lower extremities.   She has not had a history of back pain or surgical procedures on her back in the past.             Review of Systems  Constitutional, HEENT, cardiovascular, pulmonary, GI, , musculoskeletal, neuro, skin, endocrine and psych systems are negative, except as otherwise noted.      Objective    /80   Pulse 64   Temp (!) 95.6  F (35.3  C) (Tympanic)   Resp 20   Ht 1.74 m (5' 8.5\")   Wt 83.5 kg (184 lb)   LMP  (LMP Unknown)   SpO2 97%   Breastfeeding No   BMI 27.57 kg/m    Body mass index is 27.57 kg/m .  Physical Exam   GENERAL: alert and no distress  MS: sitting " with pillow behind her low back for comfort. Difficulty getting up to stand due to pain. Tenderness over the left paraspinal muscle. No SI joint tenderness or tenderness midline. Lower extremity strength intact. Unable to lie on exam table to do straight leg testing.   SKIN: no suspicious lesions or rashes  PSYCH: mentation appears normal, affect normal/bright            Signed Electronically by: Kristen M. Kehr, PA-C

## 2024-06-27 ENCOUNTER — THERAPY VISIT (OUTPATIENT)
Dept: PHYSICAL THERAPY | Facility: CLINIC | Age: 69
End: 2024-06-27
Payer: COMMERCIAL

## 2024-06-27 DIAGNOSIS — S39.012A STRAIN OF MUSCLE, FASCIA AND TENDON OF LOWER BACK, INITIAL ENCOUNTER: Primary | ICD-10-CM

## 2024-06-27 PROCEDURE — 97530 THERAPEUTIC ACTIVITIES: CPT | Mod: GP | Performed by: STUDENT IN AN ORGANIZED HEALTH CARE EDUCATION/TRAINING PROGRAM

## 2024-06-27 PROCEDURE — 97110 THERAPEUTIC EXERCISES: CPT | Mod: GP | Performed by: STUDENT IN AN ORGANIZED HEALTH CARE EDUCATION/TRAINING PROGRAM

## 2024-06-27 PROCEDURE — 97161 PT EVAL LOW COMPLEX 20 MIN: CPT | Mod: GP | Performed by: STUDENT IN AN ORGANIZED HEALTH CARE EDUCATION/TRAINING PROGRAM

## 2024-06-27 NOTE — PROGRESS NOTES
PHYSICAL THERAPY EVALUATION  Type of Visit: Evaluation     Fall Risk Screen:  Fall screen completed by: PT  Have you fallen 2 or more times in the past year?: No  Have you fallen and had an injury in the past year?: No  Is patient a fall risk?: Yes    Subjective       Presenting condition or subjective complaint: Muscle spasm in back - started in June    The patient reports she bent over to pick something up. She saw a PA-C on 6/18/24 for her back pain and was prescribed medrol dosepak and muscle relaxors which do help. She reports her pain is constant and sharp, especially with prolonged sitting, bending forward, and walking. Her pain was left lower thoracic region but is currently located at her braline and goes up across her shoulder blades. Standing straighter and putting her shoulders back helps with her pain levels. She does use heat/ice as needed. She reports always having some back and shoulder pain to some extent but no previous injury. Past medical history of a TKA in 2018.     Pt had a DX hip/pelvis/spine on 2/7/24 which showed osteopenia   Date of onset: 06/18/24    Relevant medical history: Cancer   Dates & types of surgery: 2018 TKA Right    Prior diagnostic imaging/testing results:       Prior therapy history for the same diagnosis, illness or injury: No      Prior Level of Function  Transfers: Independent  Ambulation: Independent  ADL: Independent    Living Environment  Social support: With a significant other or spouse   Type of home: House; 1 level   Stairs to enter the home: Yes 3 Is there a railing: No     Ramp: No   Stairs inside the home: Yes 15 Is there a railing: Yes     Help at home: Self Cares (home health aide/personal care attendant, family, etc)  Equipment owned:       Employment: No    Hobbies/Interests: [employer] and [employer]    Patient goals for therapy: move and be mobile    Pain assessment: See objective evaluation for additional pain details     Objective   Lumbar Spine  Evaluation  Static Posture  Standing posture: No obvious findings  Sitting posture: Fair    Dynamic Movement Screen  Single leg stance observations: Eyes open no significant findings   Double limb squat observations: Not assessed  Gait: No significant findings    Hip Joint Screen Negative    Trunk Range of Motion  Movement Loss Major Moderate Minimal Nil Pain   Flexion  X to shin   PP   Extension    x    Sidebending R        Sidebending L   x  P   Side Gliding R        Side Gliding L        Thoracic Rotation R    x    Thoracic Rotation L    x P     Hip and Knee Strength   MMT Left Right   Hip Abduction 4/5 4/5   Hip Extension     Hip ER     Hip flexion 4/5 4/5      Special Tests  Spring testing: Positive  Neural tension: Negative  SI joint test: Positive    Palpation:  Moderate tenderness to palpation at PSIS bilat, L2-5 bilat (left worse than right), glute med on L    Assessment & Plan   CLINICAL IMPRESSIONS  Medical Diagnosis: Strain of muscle, fascia and tendon of lower back, initial encounter    Treatment Diagnosis:     Impression/Assessment: Patient is a 69 year old female with bilateral mid-lower thoracic back pain complaints.  The following significant findings have been identified: Pain, Decreased ROM/flexibility, Decreased strength, and Impaired posture. These impairments interfere with their ability to perform self care tasks, recreational activities, household chores, and community mobility as compared to previous level of function.     Clinical Decision Making (Complexity):  Clinical Presentation: Stable/Uncomplicated  Clinical Presentation Rationale: based on medical and personal factors listed in PT evaluation  Clinical Decision Making (Complexity): Low complexity    PLAN OF CARE  Treatment Interventions:  Modalities: Cryotherapy, Hot Pack  Interventions: Manual Therapy, Neuromuscular Re-education, Therapeutic Activity, Therapeutic Exercise    Long Term Goals     PT Goal 1  Goal Identifier: lumbar  ROM  Goal Description: pt will improve trunk forward flexion to toes without pain  Rationale: to maximize safety and independence with performance of ADLs and functional tasks;to maximize safety and independence within the home  Target Date: 08/12/24      Frequency of Treatment: 1x/week for 4 weeks tapering to every other week  Duration of Treatment: 12 weeks    Recommended Referrals to Other Professionals: none  Education Assessment:   Learner/Method: Patient    Risks and benefits of evaluation/treatment have been explained.   Patient/Family/caregiver agrees with Plan of Care.     Evaluation Time:     PT Eval, Low Complexity Minutes (06409): 16     Signing Clinician: LALIT ÁLVAREZ [facility]                                                                                   OUTPATIENT PHYSICAL THERAPY      PLAN OF TREATMENT FOR OUTPATIENT REHABILITATION   Patient's Last Name, First Name, Bryanna Lyman YOB: 1955   Provider's Name   [facility]   Medical Record No.  [id number]     Onset Date: 06/18/24  Start of Care Date: 06/27/24     Medical Diagnosis:  Strain of muscle, fascia and tendon of lower back, initial encounter      PT Treatment Diagnosis:    Plan of Treatment  Frequency/Duration: 1x/week for 4 weeks tapering to every other week/ 12 weeks    Certification date from 06/27/24 to 09/19/24         See note for plan of treatment details and functional goals     SAMMIE SYLVESTER, PT                         I CERTIFY THE NEED FOR THESE SERVICES FURNISHED UNDER        THIS PLAN OF TREATMENT AND WHILE UNDER MY CARE     (Physician attestation of this document indicates review and certification of the therapy plan).              Referring Provider:  Kristen M Kehr    Initial Assessment  See Epic Evaluation- Start of Care Date: 06/27/24

## 2024-07-01 PROBLEM — S39.012A STRAIN OF MUSCLE, FASCIA AND TENDON OF LOWER BACK, INITIAL ENCOUNTER: Status: ACTIVE | Noted: 2024-07-01

## 2024-07-03 DIAGNOSIS — S39.012A STRAIN OF MUSCLE, FASCIA AND TENDON OF LOWER BACK, INITIAL ENCOUNTER: ICD-10-CM

## 2024-08-06 DIAGNOSIS — Z91.89 10 YEAR RISK OF MI OR STROKE 7.5% OR GREATER: ICD-10-CM

## 2024-08-06 RX ORDER — ATORVASTATIN CALCIUM 10 MG/1
10 TABLET, FILM COATED ORAL DAILY
Qty: 90 TABLET | Refills: 1 | Status: SHIPPED | OUTPATIENT
Start: 2024-08-06

## 2024-11-10 DIAGNOSIS — Z91.89 10 YEAR RISK OF MI OR STROKE 7.5% OR GREATER: ICD-10-CM

## 2024-11-10 RX ORDER — ATORVASTATIN CALCIUM 10 MG/1
10 TABLET, FILM COATED ORAL DAILY
Qty: 90 TABLET | Refills: 1 | Status: SHIPPED | OUTPATIENT
Start: 2024-11-10

## 2024-12-03 DIAGNOSIS — K44.9 HIATAL HERNIA: ICD-10-CM

## 2025-01-07 ENCOUNTER — PATIENT OUTREACH (OUTPATIENT)
Dept: CARE COORDINATION | Facility: CLINIC | Age: 70
End: 2025-01-07
Payer: COMMERCIAL

## 2025-01-20 ENCOUNTER — PATIENT OUTREACH (OUTPATIENT)
Dept: CARE COORDINATION | Facility: CLINIC | Age: 70
End: 2025-01-20
Payer: COMMERCIAL

## 2025-01-21 ENCOUNTER — PATIENT OUTREACH (OUTPATIENT)
Dept: CARE COORDINATION | Facility: CLINIC | Age: 70
End: 2025-01-21
Payer: COMMERCIAL

## 2025-03-01 DIAGNOSIS — K44.9 HIATAL HERNIA: ICD-10-CM

## 2025-03-02 DIAGNOSIS — K44.9 HIATAL HERNIA: ICD-10-CM

## 2025-03-02 RX ORDER — OMEPRAZOLE 20 MG/1
20 CAPSULE, DELAYED RELEASE ORAL DAILY
Qty: 30 CAPSULE | Refills: 0 | Status: SHIPPED | OUTPATIENT
Start: 2025-03-02

## 2025-03-03 RX ORDER — OMEPRAZOLE 20 MG/1
20 CAPSULE, DELAYED RELEASE ORAL DAILY
Qty: 90 CAPSULE | OUTPATIENT
Start: 2025-03-03

## 2025-03-23 ENCOUNTER — HEALTH MAINTENANCE LETTER (OUTPATIENT)
Age: 70
End: 2025-03-23

## 2025-03-30 DIAGNOSIS — K44.9 HIATAL HERNIA: ICD-10-CM

## 2025-03-30 RX ORDER — OMEPRAZOLE 20 MG/1
20 CAPSULE, DELAYED RELEASE ORAL DAILY
Qty: 30 CAPSULE | Refills: 0 | Status: SHIPPED | OUTPATIENT
Start: 2025-03-30

## 2025-03-31 DIAGNOSIS — K44.9 HIATAL HERNIA: ICD-10-CM

## 2025-03-31 RX ORDER — OMEPRAZOLE 20 MG/1
20 CAPSULE, DELAYED RELEASE ORAL DAILY
Qty: 90 CAPSULE | OUTPATIENT
Start: 2025-03-31

## 2025-04-28 DIAGNOSIS — K44.9 HIATAL HERNIA: ICD-10-CM

## 2025-04-28 RX ORDER — OMEPRAZOLE 20 MG/1
20 CAPSULE, DELAYED RELEASE ORAL DAILY
Qty: 30 CAPSULE | Refills: 0 | Status: SHIPPED | OUTPATIENT
Start: 2025-04-28

## 2025-05-01 ENCOUNTER — TELEPHONE (OUTPATIENT)
Dept: FAMILY MEDICINE | Facility: CLINIC | Age: 70
End: 2025-05-01
Payer: COMMERCIAL

## 2025-05-01 NOTE — TELEPHONE ENCOUNTER
Reason for Call:  Appointment Request    Patient requesting this type of appt: Chronic Diease Management/Medication/Follow-Up    Requested provider: Meghan Painter    Reason patient unable to be scheduled: Not within requested timeframe    When does patient want to be seen/preferred time: Same day    Comments: Pt would like to be seen today or tomorrow with primary if possible, otherwise a female provider    Could we send this information to you in [facility] or would you prefer to receive a phone call?:   Patient would prefer a phone call   Okay to leave a detailed message?: Yes at Cell number on file:    Telephone Information:   Mobile [phone]       Call taken on 5/1/2025 at 10:16 AM by Jaja Mcwilliams

## 2025-05-01 NOTE — TELEPHONE ENCOUNTER
Called and spoke to patient, to see what she wanted to be sen for. She wants to see only you. She has had diarrhea for 4 days and thinks she has C-diff. I told her you are only here this AM, she said she would come anytime. I told her she should come into urgent care, she declined this. Please advise.Bridget Haro [facility]

## 2025-05-01 NOTE — TELEPHONE ENCOUNTER
I am off until Monday  Would be happy to put her in BALDEV slot on Monday if she would like  Or see if there is another appt available with a female provider    Meghan Painter MD

## 2025-05-04 ENCOUNTER — HEALTH MAINTENANCE LETTER (OUTPATIENT)
Age: 70
End: 2025-05-04

## 2025-05-05 ENCOUNTER — OFFICE VISIT (OUTPATIENT)
Dept: FAMILY MEDICINE | Facility: CLINIC | Age: 70
End: 2025-05-05
Payer: COMMERCIAL

## 2025-05-05 VITALS
OXYGEN SATURATION: 99 % | DIASTOLIC BLOOD PRESSURE: 68 MMHG | WEIGHT: 194 LBS | RESPIRATION RATE: 14 BRPM | TEMPERATURE: 97.5 F | HEART RATE: 61 BPM | SYSTOLIC BLOOD PRESSURE: 119 MMHG | BODY MASS INDEX: 28.73 KG/M2 | HEIGHT: 69 IN

## 2025-05-05 DIAGNOSIS — R19.7 DIARRHEA, UNSPECIFIED TYPE: ICD-10-CM

## 2025-05-05 DIAGNOSIS — K44.9 HIATAL HERNIA: ICD-10-CM

## 2025-05-05 DIAGNOSIS — R44.8 FEELS COLD: ICD-10-CM

## 2025-05-05 DIAGNOSIS — M79.10 MUSCLE ACHE: ICD-10-CM

## 2025-05-05 DIAGNOSIS — Z85.828 HISTORY OF BASAL CELL CARCINOMA: ICD-10-CM

## 2025-05-05 DIAGNOSIS — Z12.31 VISIT FOR SCREENING MAMMOGRAM: ICD-10-CM

## 2025-05-05 DIAGNOSIS — Z00.00 MEDICARE ANNUAL WELLNESS VISIT, SUBSEQUENT: Primary | ICD-10-CM

## 2025-05-05 DIAGNOSIS — E78.5 HYPERLIPIDEMIA WITH TARGET LDL LESS THAN 130: ICD-10-CM

## 2025-05-05 LAB
ALBUMIN SERPL BCG-MCNC: 4.5 G/DL (ref 3.5–5.2)
ALP SERPL-CCNC: 100 U/L (ref 40–150)
ALT SERPL W P-5'-P-CCNC: 33 U/L (ref 0–50)
ANION GAP SERPL CALCULATED.3IONS-SCNC: 12 MMOL/L (ref 7–15)
AST SERPL W P-5'-P-CCNC: 38 U/L (ref 0–45)
BILIRUB SERPL-MCNC: 0.5 MG/DL
BUN SERPL-MCNC: 16.6 MG/DL (ref 8–23)
CALCIUM SERPL-MCNC: 10 MG/DL (ref 8.8–10.4)
CHLORIDE SERPL-SCNC: 103 MMOL/L (ref 98–107)
CHOLEST SERPL-MCNC: 168 MG/DL
CK SERPL-CCNC: 85 U/L (ref 26–192)
CREAT SERPL-MCNC: 0.9 MG/DL (ref 0.51–0.95)
EGFRCR SERPLBLD CKD-EPI 2021: 68 ML/MIN/1.73M2
ERYTHROCYTE [DISTWIDTH] IN BLOOD BY AUTOMATED COUNT: 11.7 % (ref 10–15)
FASTING STATUS PATIENT QL REPORTED: YES
FASTING STATUS PATIENT QL REPORTED: YES
GLUCOSE SERPL-MCNC: 107 MG/DL (ref 70–99)
HCO3 SERPL-SCNC: 26 MMOL/L (ref 22–29)
HCT VFR BLD AUTO: 43.1 % (ref 35–47)
HDLC SERPL-MCNC: 60 MG/DL
HGB BLD-MCNC: 14.5 G/DL (ref 11.7–15.7)
LDLC SERPL CALC-MCNC: 88 MG/DL
MCH RBC QN AUTO: 30.9 PG (ref 26.5–33)
MCHC RBC AUTO-ENTMCNC: 33.6 G/DL (ref 31.5–36.5)
MCV RBC AUTO: 92 FL (ref 78–100)
NONHDLC SERPL-MCNC: 108 MG/DL
PLATELET # BLD AUTO: 264 10E3/UL (ref 150–450)
POTASSIUM SERPL-SCNC: 4.7 MMOL/L (ref 3.4–5.3)
PROT SERPL-MCNC: 7.3 G/DL (ref 6.4–8.3)
RBC # BLD AUTO: 4.69 10E6/UL (ref 3.8–5.2)
SODIUM SERPL-SCNC: 141 MMOL/L (ref 135–145)
TRIGL SERPL-MCNC: 101 MG/DL
TSH SERPL DL<=0.005 MIU/L-ACNC: 3.24 UIU/ML (ref 0.3–4.2)
WBC # BLD AUTO: 6.2 10E3/UL (ref 4–11)

## 2025-05-05 PROCEDURE — 80061 LIPID PANEL: CPT | Performed by: FAMILY MEDICINE

## 2025-05-05 PROCEDURE — 3078F DIAST BP <80 MM HG: CPT | Performed by: FAMILY MEDICINE

## 2025-05-05 PROCEDURE — 36415 COLL VENOUS BLD VENIPUNCTURE: CPT | Performed by: FAMILY MEDICINE

## 2025-05-05 PROCEDURE — 99213 OFFICE O/P EST LOW 20 MIN: CPT | Mod: 25 | Performed by: FAMILY MEDICINE

## 2025-05-05 PROCEDURE — 1126F AMNT PAIN NOTED NONE PRSNT: CPT | Performed by: FAMILY MEDICINE

## 2025-05-05 PROCEDURE — G0439 PPPS, SUBSEQ VISIT: HCPCS | Performed by: FAMILY MEDICINE

## 2025-05-05 PROCEDURE — 85027 COMPLETE CBC AUTOMATED: CPT | Performed by: FAMILY MEDICINE

## 2025-05-05 PROCEDURE — G2211 COMPLEX E/M VISIT ADD ON: HCPCS | Performed by: FAMILY MEDICINE

## 2025-05-05 PROCEDURE — 84443 ASSAY THYROID STIM HORMONE: CPT | Performed by: FAMILY MEDICINE

## 2025-05-05 PROCEDURE — 82550 ASSAY OF CK (CPK): CPT | Performed by: FAMILY MEDICINE

## 2025-05-05 PROCEDURE — 80053 COMPREHEN METABOLIC PANEL: CPT | Performed by: FAMILY MEDICINE

## 2025-05-05 PROCEDURE — 3074F SYST BP LT 130 MM HG: CPT | Performed by: FAMILY MEDICINE

## 2025-05-05 RX ORDER — OMEPRAZOLE 20 MG/1
20 CAPSULE, DELAYED RELEASE ORAL DAILY
Qty: 90 CAPSULE | Refills: 3 | Status: SHIPPED | OUTPATIENT
Start: 2025-05-05

## 2025-05-05 ASSESSMENT — ENCOUNTER SYMPTOMS: DIARRHEA: 1

## 2025-05-05 ASSESSMENT — PAIN SCALES - GENERAL: PAINLEVEL_OUTOF10: NO PAIN (0)

## 2025-05-05 NOTE — PROGRESS NOTES
"  Assessment & Plan     Medicare annual wellness visit, subsequent      Diarrhea, unspecified type  If not improving over the next few days, pt to let me know and will check stool cxs  She does have h/o c diff in the past    Hyperlipidemia with target LDL less than 130  recheck  - Lipid panel reflex to direct LDL Fasting; Future  - Lipid panel reflex to direct LDL Fasting    Hiatal hernia  refill  - omeprazole (PRILOSEC) 20 MG DR capsule; Take 1 capsule (20 mg) by mouth daily.    Muscle ache  Check labs, stop statin to see if symptoms improve  - **CBC with platelets FUTURE 2mo; Future  - **Comprehensive metabolic panel FUTURE 2mo; Future  - **TSH with free T4 reflex FUTURE 2mo; Future  - CK total; Future  - **CBC with platelets FUTURE 2mo  - **Comprehensive metabolic panel FUTURE 2mo  - **TSH with free T4 reflex FUTURE 2mo  - CK total    Feels cold  Check labs. same  - **CBC with platelets FUTURE 2mo; Future  - **Comprehensive metabolic panel FUTURE 2mo; Future  - **TSH with free T4 reflex FUTURE 2mo; Future  - **CBC with platelets FUTURE 2mo  - **Comprehensive metabolic panel FUTURE 2mo  - **TSH with free T4 reflex FUTURE 2mo    Visit for screening mammogram  Pt scheduled  - MA Screening Bilateral w/ Jose; Future    History of basal cell carcinoma  Per derm          BMI  Estimated body mass index is 28.65 kg/m  as calculated from the following:    Height as of this encounter: 1.753 m (5' 9\").    Weight as of this encounter: 88 kg (194 lb).             Amelie Gould is a 70 year old, presenting for the following health issues:  Diarrhea      5/5/2025    11:15 AM   Additional Questions   Roomed by April   Accompanied by Self     Diarrhea    History of Present Illness       Reason for visit:  Diaharra  Symptom onset:  3-7 days ago  Symptoms include:  Diaharra  Symptom intensity:  Moderate  Symptom progression:  Improving  Had these symptoms before:  Yes  Has tried/received treatment for these symptoms:  " Yes  Previous treatment was successful:  Yes  Prior treatment description:  Unknown  What makes it worse:  Eating  What makes it better:  Not eating   She is taking medications regularly.        Always cold renay hands   Muscle pain   Thinks it all started when she started the statin  Will check labs and have her stop statin to see if symptoms improve    Diarrhea about a week not  Was going 3-4 times per day and watery  Occurs about an hour after she eats  Now with some formed stools  She is taking immodium  No fever, nausea or vomiting  Did not eat anything unusual       Annual Wellness Visit     Patient has been advised of split billing requirements and indicates understanding: Yes         Health Care Directive  Patient does not have a Health Care Directive: Discussed advance care planning with patient; information given to patient to review.  In general, how would you rate your overall physical health? good  Do you have a special diet?  Low fat/cholesterol        2/2/2024   Exercise, Social Connection, Stress   Days per week of moderate/strenous exercise 5 days   Average minutes spent exercising at this level 20 min   Frequency of gathering with friends or relatives Once   Feel stress (tense, anxious, or unable to sleep) Not at all     Do you see a dentist two times every year?  Yes  Have you been more tired than usual lately?  No  If you drink alcohol do you typically have >3 drinks per day or >7 drinks per week? No  Do you have a current opioid prescription? No  Do you use any other controlled substances or medications that are not prescribed by a provider? None  Social History     Tobacco Use    Smoking status: Never    Smokeless tobacco: Never   Vaping Use    Vaping status: Never Used   Substance Use Topics    Alcohol use: Yes     Comment: 1 drink per month    Drug use: No       Needs assistance for the following daily activities: no assistance needed  Which of the following safety concerns are present in your  home?  none identified   Do you (or your family members) have any concerns about your safety while driving?  No  Do you have any of the following hearing concerns?: No hearing concerns  In the past 6 months, have you been bothered by leaking of urine? No        2/2/2024   Social Factors   Frequency of gathering with friends or relatives Once a week   Worry food won't last until get money to buy more No   Food not last or not have enough money for food? No   Do you have housing? (Housing is defined as stable permanent housing and does not include staying outside in a car, in a tent, in an abandoned building, in an overnight shelter, or couch-surfing.) Yes   Are you worried about losing your housing? No   Lack of transportation? No   Unable to get utilities (heat,electricity)? No          5/5/2025   Fall Risk   Fallen 2 or more times in the past year? No    Trouble with walking or balance? No        Proxy-reported          Today's PHQ-2 Score:       5/5/2025    11:11 AM   PHQ-2 ( 1999 [employer])   Q1: Little interest or pleasure in doing things 0    Q2: Feeling down, depressed or hopeless 0    PHQ-2 Score 0    Q1: Little interest or pleasure in doing things Not at all   Q2: Feeling down, depressed or hopeless Not at all   PHQ-2 Score 0       Proxy-reported           2/17/2023   LAST FHS-7 RESULTS   1st degree relative breast or ovarian cancer No   Any relative bilateral breast cancer No   Any male have breast cancer No   Any ONE woman have BOTH breast AND ovarian cancer No   Any woman with breast cancer before 50yrs No   2 or more relatives with breast AND/OR ovarian cancer No   2 or more relatives with breast AND/OR bowel cancer No        Mammogram Screening - Mammogram every 1-2 years updated in Health Maintenance based on mutual decision making      History of abnormal Pap smear: No - age 65 or older with adequate negative prior screening test results (3 consecutive negative cytology results, 2 consecutive negative  cotesting results, or 2 consecutive negative HrHPV test results within 10 years, with the most recent test occurring within the recommended screening interval for the test used)        2/20/2017    12:00 AM 5/16/2011    12:00 AM   PAP / HPV   PAP (Historical)  NIL    PAP-ABSTRACT See Scanned Document            This result is from an external source.     ASCVD Risk   The 10-year ASCVD risk score (Cody RODRIGUEZ, et al., 2019) is: 9%    Values used to calculate the score:      Age: 70 years      Sex: Female      Is Non- : No      Diabetic: No      Tobacco smoker: No      Systolic Blood Pressure: 119 mmHg      Is BP treated: No      HDL Cholesterol: 54 mg/dL      Total Cholesterol: 258 mg/dL    Completed last year      Reviewed and updated as needed this visit by Provider                        Current providers sharing in care for this patient include:  Patient Care Team:  Meghan Painter MD as PCP - General (Family Practice)  Meghan Painter MD as Assigned PCP  Dominic Jensen PA-C as Physician Assistant (Emergency Medicine)  Stephenie Talbert APRN CNP as Nurse Practitioner (Dermatology)  Charles Acosta MD as Assigned Musculoskeletal Provider    The following health maintenance items are reviewed in Epic and correct as of today:  Health Maintenance   Topic Date Due    ANNUAL REVIEW OF HM ORDERS  Never done    Pneumococcal Vaccine: 50+ Years (1 of 1 - PCV) Never done    ZOSTER IMMUNIZATION (1 of 2) Never done    COVID-19 Vaccine (4 - 2024-25 season) 09/01/2024    MEDICARE ANNUAL WELLNESS VISIT  02/06/2025    LIPID  02/06/2025    MAMMO SCREENING  02/17/2025    INFLUENZA VACCINE (Season Ended) 09/01/2025    FALL RISK ASSESSMENT  05/05/2026    DIABETES SCREENING  02/06/2027    COLORECTAL CANCER SCREENING  02/15/2027    ADVANCE CARE PLANNING  02/06/2029    RSV VACCINE (1 - 1-dose 75+ series) 02/21/2030    DTAP/TDAP/TD IMMUNIZATION (4 - Td or Tdap) 12/26/2032    DEXA   "02/07/2039    HEPATITIS C SCREENING  Completed    PHQ-2 (once per calendar year)  Completed    HPV IMMUNIZATION  Aged Out    MENINGITIS IMMUNIZATION  Aged Out    PAP  Discontinued       Appropriate preventive services were discussed with this patient, including applicable screening as appropriate for fall prevention, nutrition, physical activity, Tobacco-use cessation, weight loss and cognition.  Checklist reviewing preventive services available has been given to the patient.          2/6/2024   Mini Cog   Mini-Cog Not Completed (choose reason) Patient declines         No concerns based on direct observation           Review of Systems  Constitutional, HEENT, cardiovascular, pulmonary, gi and gu systems are negative, except as otherwise noted.      Objective    /68   Pulse 61   Temp 97.5  F (36.4  C)   Resp 14   Ht 1.753 m (5' 9\")   Wt 88 kg (194 lb)   LMP  (LMP Unknown)   SpO2 99%   BMI 28.65 kg/m    Body mass index is 28.65 kg/m .  Physical Exam   GENERAL: alert and no distress  EYES: Eyes grossly normal to inspection, PERRL and conjunctivae and sclerae normal  HENT: ear canals and TM's normal, nose and mouth without ulcers or lesions  NECK: no adenopathy, no asymmetry, masses, or scars  RESP: lungs clear to auscultation - no rales, rhonchi or wheezes  CV: regular rate and rhythm, normal S1 S2, no S3 or S4, no murmur, click or rub, no peripheral edema  ABDOMEN: soft, nontender, no hepatosplenomegaly, no masses and bowel sounds normal  MS: no gross musculoskeletal defects noted, no edema  SKIN: no suspicious lesions or rashes  NEURO: Normal strength and tone, mentation intact and speech normal  PSYCH: mentation appears normal, affect normal/bright    Results for orders placed or performed in visit on 05/05/25 (from the past 24 hours)   **CBC with platelets FUTURE 2mo   Result Value Ref Range    WBC Count 6.2 4.0 - 11.0 10e3/uL    RBC Count 4.69 3.80 - 5.20 10e6/uL    Hemoglobin 14.5 11.7 - 15.7 g/dL "    Hematocrit 43.1 35.0 - 47.0 %    MCV 92 78 - 100 fL    MCH 30.9 26.5 - 33.0 pg    MCHC 33.6 31.5 - 36.5 g/dL    RDW 11.7 10.0 - 15.0 %    Platelet Count 264 150 - 450 10e3/uL           Signed Electronically by: Meghan Painter MD

## 2025-05-06 ENCOUNTER — TELEPHONE (OUTPATIENT)
Dept: FAMILY MEDICINE | Facility: CLINIC | Age: 70
End: 2025-05-06
Payer: COMMERCIAL

## 2025-05-06 ENCOUNTER — PATIENT OUTREACH (OUTPATIENT)
Dept: CARE COORDINATION | Facility: CLINIC | Age: 70
End: 2025-05-06
Payer: COMMERCIAL

## 2025-05-06 NOTE — TELEPHONE ENCOUNTER
Pt was asking if she can start taking Vitamin B Complex.     Medication list reviewed with patient. Currently taking omeprazole 20 mg daily and biotin, not taking atorvastatin. No interactions noted per up to date. Pt verbalized understanding.     Routing to provider. Please confirm that pt is ok to start taking Vitamin B Complex.     Jessie Apple RN  (RN team: ok to leave detailed msg with provider response)

## 2025-05-07 NOTE — TELEPHONE ENCOUNTER
Called and spoke with patient. Relayed provider's comments, as noted below. Patient was more understanding, explanation was reassuring. No further questions from patient at this time.      Myriam Hall RN  Clinical Triage/Primary Care  [facility]

## 2025-05-07 NOTE — TELEPHONE ENCOUNTER
Diabetes is diagnosed as a fasting blood sugar of 126 or greater. 107 is fine. The lab just considers anything over 99 to be too high.     Meghan Painter MD

## 2025-05-07 NOTE — TELEPHONE ENCOUNTER
Provider: She reports that she was told her labs were good but she is asking why the glucose of 107 is considered good if it flagged elevated. She was fasting for her labs. Please advise.  Thank you. Rowan Hurt R.N.  Patient notified of provider's message as written below. She reports that she was told her labs were good but she is asking why the glucose of 107 is considered good if it flagged elevated. She was fasting for her labs. We will send this to the provider to advise.    Ok to leave a message with the provider's response.   Bryanna     Your electrolytes, kidney and liver function and blood sugar are normal  Your cholesterol looks great  Your thyroid is normal  Your CK is normal so no muscle breakdown.  As we discussed, stop your medication lipitor to see if your symptoms improve     Meghan Painter MD   Written by Meghan Painter MD on 5/6/2025  1:51 PM CDT  Seen by patient Bryanna Estevez on 5/7/2025  5:51 AM

## 2025-07-03 ENCOUNTER — ANCILLARY PROCEDURE (OUTPATIENT)
Dept: GENERAL RADIOLOGY | Facility: CLINIC | Age: 70
End: 2025-07-03
Attending: FAMILY MEDICINE
Payer: COMMERCIAL

## 2025-07-03 ENCOUNTER — OFFICE VISIT (OUTPATIENT)
Dept: FAMILY MEDICINE | Facility: CLINIC | Age: 70
End: 2025-07-03
Payer: COMMERCIAL

## 2025-07-03 VITALS
HEART RATE: 63 BPM | TEMPERATURE: 97.5 F | DIASTOLIC BLOOD PRESSURE: 80 MMHG | OXYGEN SATURATION: 98 % | SYSTOLIC BLOOD PRESSURE: 150 MMHG | HEIGHT: 69 IN | WEIGHT: 193 LBS | BODY MASS INDEX: 28.58 KG/M2 | RESPIRATION RATE: 17 BRPM

## 2025-07-03 DIAGNOSIS — H57.11 EYE PAIN, RIGHT: ICD-10-CM

## 2025-07-03 DIAGNOSIS — M54.2 NECK PAIN: Primary | ICD-10-CM

## 2025-07-03 DIAGNOSIS — M54.2 NECK PAIN: ICD-10-CM

## 2025-07-03 ASSESSMENT — PAIN SCALES - GENERAL: PAINLEVEL_OUTOF10: MILD PAIN (3)

## 2025-07-03 NOTE — PROGRESS NOTES
"  Assessment & Plan     Neck pain  Will do trial of PT, if not better, then needs MRI  No red flag sxs  - XR Cervical Spine 2/3 Views; Future  - Physical Therapy  Referral; Future    Eye pain, right  Suspect radicular from neck as the 2 occur at the same time and eye exam is normal      The longitudinal plan of care for the diagnosis(es)/condition(s) as documented were addressed during this visit. Due to the added complexity in care, I will continue to support Bryanna in the subsequent management and with ongoing continuity of care.            Subjective   Bryanna is a 70 year old, presenting for the following health issues:  Migraine      7/3/2025     8:39 AM   Additional Questions   Roomed by dann   Accompanied by self     History of Present Illness       Reason for visit:  Headaches   She is taking medications regularly.      Pt with migraines for 4-5 years in her 30s. They went away on their own  Used a triptan for this  Headaches now returned starting last year.  Pain typically behind right eye  It is always there, and is constant and is mild. Worse with movement to the point that she is nausea.  No auras with this one like last time  Also notes bilateral  neck pain which hurts worse when the eye pain is there  It hurts worse in the sun  Has seen eye doctor and eyes are fine.   It occurs daily anywhere from 20 minutes to all day. May occur multiple times in a day  Laying down in dark makes it better.   Does not have any other neurological symptoms  Has tried some heat on her neck and it helps. Cool also helps but heat is better  She has pian in her neck with movement, will get sharp pains and has ptp over her trapezius muscles.       Review of Systems  Constitutional, HEENT, cardiovascular, pulmonary, gi and gu systems are negative, except as otherwise noted.      Objective    BP (!) 150/80   Pulse 63   Temp 97.5  F (36.4  C) (Oral)   Resp 17   Ht 1.74 m (5' 8.5\")   Wt 87.5 kg (193 lb)   LMP  (LMP " Unknown)   SpO2 98%   BMI 28.92 kg/m    Body mass index is 28.92 kg/m .  Physical Exam   GENERAL: alert and no distress  NECK: no adenopathy, no asymmetry, masses, or scars, ptp over trapezius, R>L, pain with extension and flexion of neck  NEURO: Normal strength and tone, mentation intact and speech normal    Xray - Reviewed and interpreted by me.  Cervical; disc space narrowing C6-C7 and anterior listhesis at higher level        Signed Electronically by: Meghan Painter MD

## 2025-07-05 ENCOUNTER — RESULTS FOLLOW-UP (OUTPATIENT)
Dept: FAMILY MEDICINE | Facility: CLINIC | Age: 70
End: 2025-07-05

## 2025-08-07 ENCOUNTER — THERAPY VISIT (OUTPATIENT)
Dept: PHYSICAL THERAPY | Facility: CLINIC | Age: 70
End: 2025-08-07
Attending: FAMILY MEDICINE
Payer: COMMERCIAL

## 2025-08-07 DIAGNOSIS — M54.2 NECK PAIN: ICD-10-CM

## 2025-08-07 PROBLEM — S39.012A STRAIN OF MUSCLE, FASCIA AND TENDON OF LOWER BACK, INITIAL ENCOUNTER: Status: RESOLVED | Noted: 2024-07-01 | Resolved: 2025-08-07

## 2025-08-11 ENCOUNTER — THERAPY VISIT (OUTPATIENT)
Dept: PHYSICAL THERAPY | Facility: CLINIC | Age: 70
End: 2025-08-11
Payer: COMMERCIAL

## 2025-08-11 DIAGNOSIS — M54.2 NECK PAIN: Primary | ICD-10-CM

## 2025-08-11 DIAGNOSIS — S39.012A STRAIN OF MUSCLE, FASCIA AND TENDON OF LOWER BACK, INITIAL ENCOUNTER: ICD-10-CM

## 2025-08-11 PROCEDURE — 97140 MANUAL THERAPY 1/> REGIONS: CPT | Mod: GP | Performed by: PHYSICAL THERAPIST

## 2025-08-11 PROCEDURE — 97035 APP MDLTY 1+ULTRASOUND EA 15: CPT | Mod: GP | Performed by: PHYSICAL THERAPIST

## 2025-08-16 ENCOUNTER — PATIENT OUTREACH (OUTPATIENT)
Dept: CARE COORDINATION | Facility: CLINIC | Age: 70
End: 2025-08-16
Payer: COMMERCIAL

## 2025-08-18 ENCOUNTER — THERAPY VISIT (OUTPATIENT)
Dept: PHYSICAL THERAPY | Facility: CLINIC | Age: 70
End: 2025-08-18
Payer: COMMERCIAL

## 2025-08-18 DIAGNOSIS — S39.012A STRAIN OF MUSCLE, FASCIA AND TENDON OF LOWER BACK, INITIAL ENCOUNTER: ICD-10-CM

## 2025-08-18 DIAGNOSIS — M54.2 NECK PAIN: Primary | ICD-10-CM

## 2025-08-18 PROCEDURE — 97110 THERAPEUTIC EXERCISES: CPT | Mod: GP | Performed by: PHYSICAL THERAPIST

## 2025-08-18 PROCEDURE — 97140 MANUAL THERAPY 1/> REGIONS: CPT | Mod: GP | Performed by: PHYSICAL THERAPIST

## 2025-08-18 PROCEDURE — 97035 APP MDLTY 1+ULTRASOUND EA 15: CPT | Mod: GP | Performed by: PHYSICAL THERAPIST
